# Patient Record
Sex: MALE | Race: OTHER | URBAN - METROPOLITAN AREA
[De-identification: names, ages, dates, MRNs, and addresses within clinical notes are randomized per-mention and may not be internally consistent; named-entity substitution may affect disease eponyms.]

---

## 2015-12-02 RX ORDER — METOPROLOL TARTRATE 50 MG
1 TABLET ORAL
Qty: 0 | Refills: 0 | COMMUNITY
Start: 2015-12-02

## 2017-06-05 ENCOUNTER — EMERGENCY (EMERGENCY)
Facility: HOSPITAL | Age: 72
LOS: 1 days | Discharge: HOME | End: 2017-06-05
Attending: EMERGENCY MEDICINE | Admitting: EMERGENCY MEDICINE
Payer: MEDICARE

## 2017-06-05 VITALS
DIASTOLIC BLOOD PRESSURE: 71 MMHG | OXYGEN SATURATION: 100 % | RESPIRATION RATE: 16 BRPM | TEMPERATURE: 98 F | HEART RATE: 70 BPM | SYSTOLIC BLOOD PRESSURE: 150 MMHG

## 2017-06-05 DIAGNOSIS — Z98.89 OTHER SPECIFIED POSTPROCEDURAL STATES: Chronic | ICD-10-CM

## 2017-06-05 DIAGNOSIS — Z96.653 PRESENCE OF ARTIFICIAL KNEE JOINT, BILATERAL: Chronic | ICD-10-CM

## 2017-06-05 PROCEDURE — 99284 EMERGENCY DEPT VISIT MOD MDM: CPT | Mod: 25

## 2017-06-05 NOTE — ED ADULT TRIAGE NOTE - CHIEF COMPLAINT QUOTE
pt. states he had a fall yesterday afternoon , secondary to a mechanical fall. Pt. states fall was witnessed, where he hit the front of his head and injured his right wrist/ hand. Pt. arrives with scrapes on his nose and a swollen right hand. Pt. has + pulses , and has intact sensation. Pt. has noticeable weakness on right hand, difficult for pt. to move 2nd and 3rd digit. pt. states he had a fall yesterday afternoon , secondary to a mechanical fall. Pt. states fall was witnessed, where he hit the front of his head and injured his right wrist/ hand. Pt. arrives with scrapes on his nose and a swollen right hand. Pt. states he is only on baby aspirin.  Pt. has + pulses , and has intact sensation. Pt. has noticeable weakness on right hand, difficult for pt. to move 2nd and 3rd digit.

## 2017-06-06 VITALS
SYSTOLIC BLOOD PRESSURE: 138 MMHG | RESPIRATION RATE: 16 BRPM | HEART RATE: 68 BPM | OXYGEN SATURATION: 100 % | DIASTOLIC BLOOD PRESSURE: 69 MMHG | TEMPERATURE: 98 F

## 2017-06-06 PROCEDURE — 73502 X-RAY EXAM HIP UNI 2-3 VIEWS: CPT | Mod: 26,RT

## 2017-06-06 PROCEDURE — 70486 CT MAXILLOFACIAL W/O DYE: CPT | Mod: 26

## 2017-06-06 PROCEDURE — 72125 CT NECK SPINE W/O DYE: CPT | Mod: 26

## 2017-06-06 PROCEDURE — 73130 X-RAY EXAM OF HAND: CPT | Mod: 26,RT

## 2017-06-06 PROCEDURE — 70450 CT HEAD/BRAIN W/O DYE: CPT | Mod: 26

## 2017-06-06 RX ORDER — ACETAMINOPHEN 500 MG
975 TABLET ORAL ONCE
Qty: 0 | Refills: 0 | Status: COMPLETED | OUTPATIENT
Start: 2017-06-06 | End: 2017-06-06

## 2017-06-06 RX ADMIN — Medication 975 MILLIGRAM(S): at 02:21

## 2017-06-06 NOTE — ED PROVIDER NOTE - OBJECTIVE STATEMENT
73 yo man presenting with mechanical fall yesterday presenting with facial pain, right hand pain, and right hip pain.  Pain is worse with movement, worst at hand, nonradiating, not better with anything, moderate in severity.  No LOC, does report he fell forward on to his face.   Has been ambulating since then.

## 2017-06-06 NOTE — ED PROVIDER NOTE - PHYSICAL EXAMINATION
[facility] att: General: Well appearing, nontoxic, no acute distress; Head: Normocephalic small abrasions at face, no maxillofacial padmini tenderness; Eyes: PERRL, EOMI, No e/o ocular trauma; ENT: Airway patent, Oral and nasal within normal limits, no e/o septal hematoma, no e/o dental trauma; Neck: Bilateral paraspinal tenderness and midline tenderness, no anterior mass or crepitus, trachea midline; Chest: Lungs clear to auscultation bilateral, no chest wall tenderness or crepitus; Cardiac: Regular rate and rhythm, no murmurs, rubs or gallops; Abdomen: soft, nontender, nondistended; no guarding or rebound; Musculoskeletal: Right hip tender, no pain with axial load or hip flexion, no knee, ankle, foot tenderness, swelling and tender at right 2-4 metacarpals, no snuff box or carpal tenderness, no proximal tendernessExtremities symmetric, nontender at proximal tenderness at forearm, elbow, shoulder, othewise extremities nontender; Skin: No rash, normal skin tone; Neuro: Alert and Oriented to person, place, and time; No focal deficit, CN 2-12 symmetric and intact; Back: No thoracic or lumbar midline tenderness or deformity

## 2017-06-06 NOTE — ED PROVIDER NOTE - MEDICAL DECISION MAKING DETAILS
Liz att: 71 yo man presenting with mechanical fall. Liz att: 71 yo man presenting with mechanical fall, head/face trauma.  No e/o skull fracture, intracranial bleed, dental trauma, cervical, thoracic, or vertebral fracture or subluxation, no suspicion of thoracic, abdominal, or pelvis.  Significant amount of swelling at right 2/3 MC, no obvious fracture on XRay.  Placed in volar splint, referred to hand surgery.  Patient informed of ED visit findings, understands plan.  Patient provided with written and further verbal instructions not included in discharge paperwork.  Patient instructed to follow up with their primary care physician in 2-3 days and return for new, worsened, or persistent symptoms.

## 2017-06-06 NOTE — ED PROVIDER NOTE - CARE PLAN
Principal Discharge DX:	Traumatic injury of head, initial encounter  Secondary Diagnosis:	Contusion of right hand, initial encounter

## 2017-06-06 NOTE — ED PROVIDER NOTE - PSH
History of cardiac catheterization  2010 2 stents, 2012 3 stents, 12/2014 2 coronary artery stents  S/P knee replacement, bilateral    S/P shoulder surgery  s/p R rotator cuff surgery

## 2017-06-06 NOTE — ED PROVIDER NOTE - PMH
Anginal pain    Benign hypertrophy of prostate    CAD (coronary artery disease)    Depression  type 2 controlled  Diabetes    Diverticulitis    Hyperlipidemia    Hypertension    Hyponatremia    Lumbar disc disorder    MI (myocardial infarction)  12/2014  OA (osteoarthritis)    Stented coronary artery    Thyroid disease

## 2017-06-06 NOTE — ED ADULT NURSE NOTE - CHIEF COMPLAINT QUOTE
pt. states he had a fall yesterday afternoon , secondary to a mechanical fall. Pt. states fall was witnessed, where he hit the front of his head and injured his right wrist/ hand. Pt. arrives with scrapes on his nose and a swollen right hand. Pt. states he is only on baby aspirin.  Pt. has + pulses , and has intact sensation. Pt. has noticeable weakness on right hand, difficult for pt. to move 2nd and 3rd digit.

## 2017-06-06 NOTE — ED ADULT NURSE NOTE - OBJECTIVE STATEMENT
pt received to room 27, AAOx3, c/o pain to rt. hip, rt. hand, and face s/p mechanical fall at home yesterday. pt noted w abrasion to nose. VS as noted, pt in NAD, evaluated by MD, awaiting XR and CT, medicated per orders, will continue to monitor pt.

## 2018-04-09 ENCOUNTER — OUTPATIENT (OUTPATIENT)
Dept: OUTPATIENT SERVICES | Facility: HOSPITAL | Age: 73
LOS: 1 days | End: 2018-04-09
Payer: MEDICARE

## 2018-04-09 VITALS
WEIGHT: 179.9 LBS | TEMPERATURE: 98 F | DIASTOLIC BLOOD PRESSURE: 77 MMHG | OXYGEN SATURATION: 97 % | SYSTOLIC BLOOD PRESSURE: 133 MMHG | HEIGHT: 65 IN | HEART RATE: 74 BPM | RESPIRATION RATE: 16 BRPM

## 2018-04-09 DIAGNOSIS — Z98.89 OTHER SPECIFIED POSTPROCEDURAL STATES: Chronic | ICD-10-CM

## 2018-04-09 DIAGNOSIS — M54.9 DORSALGIA, UNSPECIFIED: ICD-10-CM

## 2018-04-09 DIAGNOSIS — M71.38 OTHER BURSAL CYST, OTHER SITE: ICD-10-CM

## 2018-04-09 DIAGNOSIS — Z01.818 ENCOUNTER FOR OTHER PREPROCEDURAL EXAMINATION: ICD-10-CM

## 2018-04-09 DIAGNOSIS — Z96.653 PRESENCE OF ARTIFICIAL KNEE JOINT, BILATERAL: Chronic | ICD-10-CM

## 2018-04-09 DIAGNOSIS — I25.10 ATHEROSCLEROTIC HEART DISEASE OF NATIVE CORONARY ARTERY WITHOUT ANGINA PECTORIS: ICD-10-CM

## 2018-04-09 DIAGNOSIS — M54.16 RADICULOPATHY, LUMBAR REGION: ICD-10-CM

## 2018-04-09 DIAGNOSIS — Z98.890 OTHER SPECIFIED POSTPROCEDURAL STATES: Chronic | ICD-10-CM

## 2018-04-09 DIAGNOSIS — E11.9 TYPE 2 DIABETES MELLITUS WITHOUT COMPLICATIONS: ICD-10-CM

## 2018-04-09 LAB
ANION GAP SERPL CALC-SCNC: 12 MMOL/L — SIGNIFICANT CHANGE UP (ref 5–17)
BUN SERPL-MCNC: 17 MG/DL — SIGNIFICANT CHANGE UP (ref 7–23)
CALCIUM SERPL-MCNC: 9.2 MG/DL — SIGNIFICANT CHANGE UP (ref 8.4–10.5)
CHLORIDE SERPL-SCNC: 99 MMOL/L — SIGNIFICANT CHANGE UP (ref 96–108)
CO2 SERPL-SCNC: 25 MMOL/L — SIGNIFICANT CHANGE UP (ref 22–31)
CREAT SERPL-MCNC: 1.05 MG/DL — SIGNIFICANT CHANGE UP (ref 0.5–1.3)
GLUCOSE SERPL-MCNC: 131 MG/DL — HIGH (ref 70–99)
HBA1C BLD-MCNC: 6.2 % — HIGH (ref 4–5.6)
HCT VFR BLD CALC: 33.9 % — LOW (ref 39–50)
HGB BLD-MCNC: 11.7 G/DL — LOW (ref 13–17)
MCHC RBC-ENTMCNC: 32.2 PG — SIGNIFICANT CHANGE UP (ref 27–34)
MCHC RBC-ENTMCNC: 34.5 GM/DL — SIGNIFICANT CHANGE UP (ref 32–36)
MCV RBC AUTO: 93.4 FL — SIGNIFICANT CHANGE UP (ref 80–100)
MRSA PCR RESULT.: SIGNIFICANT CHANGE UP
PLATELET # BLD AUTO: 251 K/UL — SIGNIFICANT CHANGE UP (ref 150–400)
POTASSIUM SERPL-MCNC: 4.1 MMOL/L — SIGNIFICANT CHANGE UP (ref 3.5–5.3)
POTASSIUM SERPL-SCNC: 4.1 MMOL/L — SIGNIFICANT CHANGE UP (ref 3.5–5.3)
RBC # BLD: 3.63 M/UL — LOW (ref 4.2–5.8)
RBC # FLD: 13.6 % — SIGNIFICANT CHANGE UP (ref 10.3–14.5)
S AUREUS DNA NOSE QL NAA+PROBE: SIGNIFICANT CHANGE UP
SODIUM SERPL-SCNC: 136 MMOL/L — SIGNIFICANT CHANGE UP (ref 135–145)
WBC # BLD: 4.37 K/UL — SIGNIFICANT CHANGE UP (ref 3.8–10.5)
WBC # FLD AUTO: 4.37 K/UL — SIGNIFICANT CHANGE UP (ref 3.8–10.5)

## 2018-04-09 PROCEDURE — 80048 BASIC METABOLIC PNL TOTAL CA: CPT

## 2018-04-09 PROCEDURE — 87640 STAPH A DNA AMP PROBE: CPT

## 2018-04-09 PROCEDURE — 85027 COMPLETE CBC AUTOMATED: CPT

## 2018-04-09 PROCEDURE — 83036 HEMOGLOBIN GLYCOSYLATED A1C: CPT

## 2018-04-09 PROCEDURE — 87641 MR-STAPH DNA AMP PROBE: CPT

## 2018-04-09 PROCEDURE — G0463: CPT

## 2018-04-09 RX ORDER — LIDOCAINE HCL 20 MG/ML
0.2 VIAL (ML) INJECTION ONCE
Qty: 0 | Refills: 0 | Status: DISCONTINUED | OUTPATIENT
Start: 2018-04-23 | End: 2018-04-23

## 2018-04-09 RX ORDER — SODIUM CHLORIDE 9 MG/ML
3 INJECTION INTRAMUSCULAR; INTRAVENOUS; SUBCUTANEOUS EVERY 8 HOURS
Qty: 0 | Refills: 0 | Status: DISCONTINUED | OUTPATIENT
Start: 2018-04-23 | End: 2018-04-23

## 2018-04-09 RX ORDER — CEFAZOLIN SODIUM 1 G
2000 VIAL (EA) INJECTION ONCE
Qty: 0 | Refills: 0 | Status: DISCONTINUED | OUTPATIENT
Start: 2018-04-23 | End: 2018-04-23

## 2018-04-09 NOTE — H&P PST ADULT - HISTORY OF PRESENT ILLNESS
69 y/o male H/O Lumbar pain radiating down b/l legs and paresthesia f 72 year old male with PMH of HTN, CAD with stents x 7 last 2 stents from 2014, Dm2, BPH, GERD with complaints of lower back pain x many months worse x 2-3 months planned for Left L3 Decompressive hemilaminectomy, excision of synovial cyst on 4/23/18.

## 2018-04-09 NOTE — H&P PST ADULT - NEGATIVE CARDIOVASCULAR SYMPTOMS
no dyspnea on exertion/no paroxysmal nocturnal dyspnea/no chest pain/no peripheral edema/no palpitations

## 2018-04-09 NOTE — H&P PST ADULT - PSH
History of cardiac catheterization  2010 2 stents, 2012 3 stents, 12/2014 2 coronary artery stents  S/P knee replacement, bilateral    S/P shoulder surgery  s/p R rotator cuff surgery History of back surgery  lumbar  History of cardiac catheterization  2010 2 stents, 2012 3 stents, 12/2014 2 coronary artery stents  S/P knee replacement, bilateral    S/P shoulder surgery  s/p R rotator cuff surgery

## 2018-04-09 NOTE — H&P PST ADULT - PMH
Benign hypertrophy of prostate    CAD (coronary artery disease)    Depression    Diabetes  type 2  Diverticulitis    Hyperlipidemia    Hypertension    Lumbar disc disorder    MI (myocardial infarction)  12/2014  OA (osteoarthritis)    Stented coronary artery    Thyroid disease Benign hypertrophy of prostate    CAD (coronary artery disease)  cardiac stents x7  Depression    Diabetes  type 2  Diverticulitis    Hyperlipidemia    Hypertension    Lumbar disc disorder    Lumbar radiculopathy    MI (myocardial infarction)  12/2014  OA (osteoarthritis)    Stented coronary artery    Thyroid disease

## 2018-04-09 NOTE — H&P PST ADULT - PROBLEM SELECTOR PLAN 3
HgA1c ordered  FS the day of procedure  Hold Janumet, Actos am of procedure  continue Levemir at night

## 2018-04-09 NOTE — H&P PST ADULT - PROBLEM SELECTOR PLAN 2
continue ASA ( spoke to Catalino At Dr. Banegas office)  patient scheduled to see cardiologist 4/10 asymptomatic   continue ASA ( spoke to Catalino At Dr. Banegas office)  EKG 6/2017 on sunrise

## 2018-04-09 NOTE — H&P PST ADULT - GASTROINTESTINAL DETAILS
bowel sounds normal/soft/no distention soft/bowel sounds normal/no masses palpable/nontender/no distention

## 2018-04-09 NOTE — H&P PST ADULT - PROBLEM SELECTOR PLAN 1
planned for Left L3 Decompressive hemilaminectomy, excision of synovial cyst on 4/23/18.   PST labs send  EKG 6/2017 on sunrise   Preprocedure surgical scrub instructions discussed planned for Left L3 Decompressive hemilaminectomy, excision of synovial cyst on 4/23/18.   PST labs send    Preprocedure surgical scrub instructions discussed

## 2018-04-09 NOTE — H&P PST ADULT - MUSCULOSKELETAL
details… detailed exam normal strength/no calf tenderness/ROM intact no joint erythema/no joint warmth/no calf tenderness

## 2018-04-09 NOTE — H&P PST ADULT - ATTENDING COMMENTS
Pt seen and examined. Pt had slight decrease in level of consciousness this morning in SDA. . BP normotensive. CBC and Chemistry panel unremarkable. EKG demonstrates T wave inversions in lateral leads. Pt received 324 mg of chewable ASA. Will cancel surgery and have pt evaluated in [facility] ER. Case d/w Dr. Perez of Anesthesia. Case also d/w Dr. Qasim De Paz of Cardiology, who will evaluate pt. Dr. Perez has spoken with the patient's son, who is a physcian. Pt seen and examined. Pt had slight decrease in level of consciousness this morning in SDA. . BP normotensive. CBC and Chemistry panel unremarkable. EKG demonstrates T wave inversions in lateral leads. Pt received 324 mg of chewable ASA. Will cancel surgery and have pt evaluated in [facility] ER. Case d/w Dr. Perez of Anesthesia. Case also d/w Dr. Qasim De Paz of Cardiology, who will evaluate pt. Dr. Perez has spoken with the patient's son, who is a physcian.    Addendum 4/30/18:  Pt evaluated in last week with cardiac echo and stress test - pt has been cleared for surgery today by Cardiologist.

## 2018-04-09 NOTE — H&P PST ADULT - CONSTITUTIONAL DETAILS
well-groomed/well-developed/obese/no distress no distress/well-developed/well-nourished/well-groomed

## 2018-04-09 NOTE — H&P PST ADULT - CONSTITUTIONAL
Anesthesia Evaluation     . Pt has had prior anesthetic.            ROS/MED HX    ENT/Pulmonary:       Neurologic:       Cardiovascular:         METS/Exercise Tolerance:     Hematologic:         Musculoskeletal:         GI/Hepatic:     (+) GERD Other GI/Hepatic celiac dz, eosin esoph.      Renal/Genitourinary:         Endo:         Psychiatric:         Infectious Disease:         Malignancy:         Other:                     Physical Exam  Normal systems: cardiovascular, pulmonary and dental    Airway   Mallampati: II  TM distance: >3 FB  Neck ROM: full    Dental     Cardiovascular       Pulmonary                     Anesthesia Plan      History & Physical Review  History and physical reviewed and following examination; no interval change.    ASA Status:  2 .    NPO Status:  > 8 hours    Plan for General with Inhalation induction. Maintenance will be Inhalation.           Postoperative Care      Consents  Anesthetic plan, risks, benefits and alternatives discussed with:  Patient and Parent (Mother and/or Father).  Use of blood products discussed: Yes.   Use of blood products discussed with Patient and Parent (Mother and/or Father).  Consented to blood products.  .                          .  
detailed exam

## 2018-04-23 ENCOUNTER — OUTPATIENT (OUTPATIENT)
Dept: INPATIENT UNIT | Facility: HOSPITAL | Age: 73
LOS: 1 days | End: 2018-04-23

## 2018-04-23 ENCOUNTER — EMERGENCY (EMERGENCY)
Facility: HOSPITAL | Age: 73
LOS: 1 days | Discharge: HOME | End: 2018-04-23
Attending: EMERGENCY MEDICINE
Payer: MEDICARE

## 2018-04-23 VITALS
RESPIRATION RATE: 19 BRPM | HEART RATE: 77 BPM | TEMPERATURE: 99 F | SYSTOLIC BLOOD PRESSURE: 176 MMHG | DIASTOLIC BLOOD PRESSURE: 92 MMHG | OXYGEN SATURATION: 96 %

## 2018-04-23 VITALS
DIASTOLIC BLOOD PRESSURE: 78 MMHG | SYSTOLIC BLOOD PRESSURE: 126 MMHG | OXYGEN SATURATION: 98 % | TEMPERATURE: 98 F | RESPIRATION RATE: 19 BRPM | HEART RATE: 75 BPM

## 2018-04-23 DIAGNOSIS — Z95.5 PRESENCE OF CORONARY ANGIOPLASTY IMPLANT AND GRAFT: Chronic | ICD-10-CM

## 2018-04-23 DIAGNOSIS — Z96.653 PRESENCE OF ARTIFICIAL KNEE JOINT, BILATERAL: Chronic | ICD-10-CM

## 2018-04-23 DIAGNOSIS — M71.38 OTHER BURSAL CYST, OTHER SITE: ICD-10-CM

## 2018-04-23 DIAGNOSIS — Z98.89 OTHER SPECIFIED POSTPROCEDURAL STATES: Chronic | ICD-10-CM

## 2018-04-23 DIAGNOSIS — M54.9 DORSALGIA, UNSPECIFIED: ICD-10-CM

## 2018-04-23 DIAGNOSIS — Z98.890 OTHER SPECIFIED POSTPROCEDURAL STATES: Chronic | ICD-10-CM

## 2018-04-23 LAB
ALBUMIN SERPL ELPH-MCNC: 3.8 G/DL — SIGNIFICANT CHANGE UP (ref 3.3–5)
ALP SERPL-CCNC: 48 U/L — SIGNIFICANT CHANGE UP (ref 40–120)
ALT FLD-CCNC: 15 U/L — SIGNIFICANT CHANGE UP (ref 10–45)
ANION GAP SERPL CALC-SCNC: 12 MMOL/L — SIGNIFICANT CHANGE UP (ref 5–17)
ANION GAP SERPL CALC-SCNC: 15 MMOL/L — SIGNIFICANT CHANGE UP (ref 5–17)
APTT BLD: 27.6 SEC — SIGNIFICANT CHANGE UP (ref 27.5–37.4)
AST SERPL-CCNC: 14 U/L — SIGNIFICANT CHANGE UP (ref 10–40)
BASOPHILS # BLD AUTO: 0 K/UL — SIGNIFICANT CHANGE UP (ref 0–0.2)
BASOPHILS NFR BLD AUTO: 0.2 % — SIGNIFICANT CHANGE UP (ref 0–2)
BILIRUB SERPL-MCNC: 0.5 MG/DL — SIGNIFICANT CHANGE UP (ref 0.2–1.2)
BLD GP AB SCN SERPL QL: NEGATIVE — SIGNIFICANT CHANGE UP
BUN SERPL-MCNC: 11 MG/DL — SIGNIFICANT CHANGE UP (ref 7–23)
BUN SERPL-MCNC: 12 MG/DL — SIGNIFICANT CHANGE UP (ref 7–23)
CALCIUM SERPL-MCNC: 9.2 MG/DL — SIGNIFICANT CHANGE UP (ref 8.4–10.5)
CALCIUM SERPL-MCNC: 9.4 MG/DL — SIGNIFICANT CHANGE UP (ref 8.4–10.5)
CHLORIDE SERPL-SCNC: 100 MMOL/L — SIGNIFICANT CHANGE UP (ref 96–108)
CHLORIDE SERPL-SCNC: 99 MMOL/L — SIGNIFICANT CHANGE UP (ref 96–108)
CO2 SERPL-SCNC: 22 MMOL/L — SIGNIFICANT CHANGE UP (ref 22–31)
CO2 SERPL-SCNC: 26 MMOL/L — SIGNIFICANT CHANGE UP (ref 22–31)
CREAT SERPL-MCNC: 1.12 MG/DL — SIGNIFICANT CHANGE UP (ref 0.5–1.3)
CREAT SERPL-MCNC: 1.23 MG/DL — SIGNIFICANT CHANGE UP (ref 0.5–1.3)
EOSINOPHIL # BLD AUTO: 0 K/UL — SIGNIFICANT CHANGE UP (ref 0–0.5)
EOSINOPHIL NFR BLD AUTO: 0.5 % — SIGNIFICANT CHANGE UP (ref 0–6)
GLUCOSE BLDC GLUCOMTR-MCNC: 133 MG/DL — HIGH (ref 70–99)
GLUCOSE SERPL-MCNC: 146 MG/DL — HIGH (ref 70–99)
GLUCOSE SERPL-MCNC: 147 MG/DL — HIGH (ref 70–99)
HCT VFR BLD CALC: 36.5 % — LOW (ref 39–50)
HCT VFR BLD CALC: 37.3 % — LOW (ref 39–50)
HGB BLD-MCNC: 12.4 G/DL — LOW (ref 13–17)
HGB BLD-MCNC: 12.5 G/DL — LOW (ref 13–17)
INR BLD: 1.22 RATIO — HIGH (ref 0.88–1.16)
LYMPHOCYTES # BLD AUTO: 0.9 K/UL — LOW (ref 1–3.3)
LYMPHOCYTES # BLD AUTO: 11.6 % — LOW (ref 13–44)
MCHC RBC-ENTMCNC: 33.3 PG — SIGNIFICANT CHANGE UP (ref 27–34)
MCHC RBC-ENTMCNC: 33.6 GM/DL — SIGNIFICANT CHANGE UP (ref 32–36)
MCHC RBC-ENTMCNC: 33.7 PG — SIGNIFICANT CHANGE UP (ref 27–34)
MCHC RBC-ENTMCNC: 34 GM/DL — SIGNIFICANT CHANGE UP (ref 32–36)
MCV RBC AUTO: 99.1 FL — SIGNIFICANT CHANGE UP (ref 80–100)
MCV RBC AUTO: 99.1 FL — SIGNIFICANT CHANGE UP (ref 80–100)
MONOCYTES # BLD AUTO: 0.9 K/UL — SIGNIFICANT CHANGE UP (ref 0–0.9)
MONOCYTES NFR BLD AUTO: 10.9 % — SIGNIFICANT CHANGE UP (ref 2–14)
NEUTROPHILS # BLD AUTO: 6.2 K/UL — SIGNIFICANT CHANGE UP (ref 1.8–7.4)
NEUTROPHILS NFR BLD AUTO: 76.8 % — SIGNIFICANT CHANGE UP (ref 43–77)
PLATELET # BLD AUTO: 223 K/UL — SIGNIFICANT CHANGE UP (ref 150–400)
PLATELET # BLD AUTO: 238 K/UL — SIGNIFICANT CHANGE UP (ref 150–400)
POTASSIUM SERPL-MCNC: 3.8 MMOL/L — SIGNIFICANT CHANGE UP (ref 3.5–5.3)
POTASSIUM SERPL-MCNC: 4 MMOL/L — SIGNIFICANT CHANGE UP (ref 3.5–5.3)
POTASSIUM SERPL-SCNC: 3.8 MMOL/L — SIGNIFICANT CHANGE UP (ref 3.5–5.3)
POTASSIUM SERPL-SCNC: 4 MMOL/L — SIGNIFICANT CHANGE UP (ref 3.5–5.3)
PROT SERPL-MCNC: 7.6 G/DL — SIGNIFICANT CHANGE UP (ref 6–8.3)
PROTHROM AB SERPL-ACNC: 13.3 SEC — HIGH (ref 9.8–12.7)
RBC # BLD: 3.68 M/UL — LOW (ref 4.2–5.8)
RBC # BLD: 3.76 M/UL — LOW (ref 4.2–5.8)
RBC # FLD: 12.9 % — SIGNIFICANT CHANGE UP (ref 10.3–14.5)
RBC # FLD: 12.9 % — SIGNIFICANT CHANGE UP (ref 10.3–14.5)
RH IG SCN BLD-IMP: POSITIVE — SIGNIFICANT CHANGE UP
SODIUM SERPL-SCNC: 137 MMOL/L — SIGNIFICANT CHANGE UP (ref 135–145)
SODIUM SERPL-SCNC: 137 MMOL/L — SIGNIFICANT CHANGE UP (ref 135–145)
TROPONIN T SERPL-MCNC: <0.01 NG/ML — SIGNIFICANT CHANGE UP (ref 0–0.06)
TROPONIN T SERPL-MCNC: <0.01 NG/ML — SIGNIFICANT CHANGE UP (ref 0–0.06)
WBC # BLD: 6.6 K/UL — SIGNIFICANT CHANGE UP (ref 3.8–10.5)
WBC # BLD: 8 K/UL — SIGNIFICANT CHANGE UP (ref 3.8–10.5)
WBC # FLD AUTO: 6.6 K/UL — SIGNIFICANT CHANGE UP (ref 3.8–10.5)
WBC # FLD AUTO: 8 K/UL — SIGNIFICANT CHANGE UP (ref 3.8–10.5)

## 2018-04-23 PROCEDURE — 80053 COMPREHEN METABOLIC PANEL: CPT

## 2018-04-23 PROCEDURE — 86900 BLOOD TYPING SEROLOGIC ABO: CPT

## 2018-04-23 PROCEDURE — 71045 X-RAY EXAM CHEST 1 VIEW: CPT | Mod: 26

## 2018-04-23 PROCEDURE — 99285 EMERGENCY DEPT VISIT HI MDM: CPT | Mod: 25,GC

## 2018-04-23 PROCEDURE — 86901 BLOOD TYPING SEROLOGIC RH(D): CPT

## 2018-04-23 PROCEDURE — 85610 PROTHROMBIN TIME: CPT

## 2018-04-23 PROCEDURE — 80048 BASIC METABOLIC PNL TOTAL CA: CPT

## 2018-04-23 PROCEDURE — 93005 ELECTROCARDIOGRAM TRACING: CPT

## 2018-04-23 PROCEDURE — 93010 ELECTROCARDIOGRAM REPORT: CPT

## 2018-04-23 PROCEDURE — 99284 EMERGENCY DEPT VISIT MOD MDM: CPT

## 2018-04-23 PROCEDURE — 85730 THROMBOPLASTIN TIME PARTIAL: CPT

## 2018-04-23 PROCEDURE — 93308 TTE F-UP OR LMTD: CPT

## 2018-04-23 PROCEDURE — 86850 RBC ANTIBODY SCREEN: CPT

## 2018-04-23 PROCEDURE — 71045 X-RAY EXAM CHEST 1 VIEW: CPT

## 2018-04-23 PROCEDURE — 84484 ASSAY OF TROPONIN QUANT: CPT

## 2018-04-23 PROCEDURE — 85027 COMPLETE CBC AUTOMATED: CPT

## 2018-04-23 PROCEDURE — 93308 TTE F-UP OR LMTD: CPT | Mod: 26

## 2018-04-23 PROCEDURE — 82962 GLUCOSE BLOOD TEST: CPT

## 2018-04-23 RX ORDER — ASPIRIN/CALCIUM CARB/MAGNESIUM 324 MG
324 TABLET ORAL ONCE
Qty: 0 | Refills: 0 | Status: COMPLETED | OUTPATIENT
Start: 2018-04-23 | End: 2018-04-23

## 2018-04-23 RX ADMIN — SODIUM CHLORIDE 3 MILLILITER(S): 9 INJECTION INTRAMUSCULAR; INTRAVENOUS; SUBCUTANEOUS at 07:55

## 2018-04-23 RX ADMIN — Medication 324 MILLIGRAM(S): at 07:55

## 2018-04-23 NOTE — ED ADULT NURSE NOTE - PSH
H/O heart artery stent    History of back surgery  lumbar  History of cardiac catheterization  2010 2 stents, 2012 3 stents, 12/2014 2 coronary artery stents  S/P knee replacement, bilateral    S/P shoulder surgery  s/p R rotator cuff surgery

## 2018-04-23 NOTE — ED PROVIDER NOTE - PROGRESS NOTE DETAILS
Work up syncope. Labs/EKG ordered. D/w Cardiologist, Dr. Shaik Harvey- Patient has old TWI in lateral precordials. History of vasovagal episode prior to knee procedure a few years. Attending Gray: called neurosurgery unable to do surgery today. will need to reschedule. delta trop unremarkable. will po and d/c

## 2018-04-23 NOTE — CONSULT NOTE ADULT - ASSESSMENT
Syncope  likely vasovagal from hypovolemia   pt wants to follow up with his own cardiologist   consider DC ranexa  pt is advised to have stress test / echo prior to OR    abnormal EKG   as per his cardiologist the changes are chronic   agree with MARVIN    CAD  history of stent   antiplt therapy   statin

## 2018-04-23 NOTE — ED PROVIDER NOTE - MEDICAL DECISION MAKING DETAILS
Attending Castellano: 73 y/o male with multiple medical issues presenting after episode of syncopy. pt scheduled to have cyst removed today. while in pre op had reported synocpal episode. history atypical for seizure. upon arrival pt awake and alert without complaints. no black or bloody stools and conjuntiva pink making GIB less likely. no sob or pleuritic pain to suggest PE. pt has a history of vasovagal episodes in the past and did not eat today which could be source. d/w ;pt's cardiologist and EKG similar to previous. will re-eval

## 2018-04-23 NOTE — ED PROVIDER NOTE - PMH
Benign hypertrophy of prostate    CAD (coronary artery disease)  cardiac stents x7  Depression    Diabetes  type 2  Diverticulitis    Hyperlipidemia    Hypertension    Lumbar disc disorder    Lumbar radiculopathy    MI (myocardial infarction)  12/2014  OA (osteoarthritis)    Stented coronary artery    Thyroid disease

## 2018-04-23 NOTE — ED ADULT NURSE NOTE - OBJECTIVE STATEMENT
72 year old male alert and oriented x 4 came to the ED from Same Day Admission for surgery on cyst in L3.  Patient said he ws sitting in a chair and felt dizzy and closed his eyes for a few seconds and does not remember what happened.  Patient said he was incontinent of urine after the episode.  Patient said he was given 4 81mg ASA upstairs to chew and EKG was done and there was concern for EKG changes and patient was sent to the ED.  On arrival, patient speaking clearly and moving all 4 extremities and denies; chest pain, shortness of breath, N/V, fevers, chills, dizziness.  Patient placed on CM in SR and arrived with #18 IV in left forearm from prior unit in hospital.  Patient is Diabetic and said he took his Insulin last night and ate last about 2100.  Safety ensured.

## 2018-04-23 NOTE — CONSULT NOTE ADULT - SUBJECTIVE AND OBJECTIVE BOX
CHIEF COMPLAINT:Patient is a 72y old  Male who presents with a chief complaint of     HISTORY OF PRESENT ILLNESS:  This is a pleasant gentleman with history as below presented with back pain planned for spine surgery but had syncope today and dizziness prior to surgery  now feels better  no cp   no sob     PAST MEDICAL & SURGICAL HISTORY:  Lumbar radiculopathy  OA (osteoarthritis)  Diverticulitis  MI (myocardial infarction): 12/2014  Lumbar disc disorder  Benign hypertrophy of prostate  Thyroid disease  Depression  Stented coronary artery  CAD (coronary artery disease): cardiac stents x7  Diabetes: type 2  Hyperlipidemia  Hypertension  H/O heart artery stent  History of back surgery: lumbar  History of cardiac catheterization: 2010 2 stents, 2012 3 stents, 12/2014 2 coronary artery stents  S/P shoulder surgery: s/p R rotator cuff surgery  S/P knee replacement, bilateral          MEDICATIONS:    reviewed               FAMILY HISTORY:  Family history of heart disease (Sibling)      Non-contributory    SOCIAL HISTORY:    No tobacco, drugs or etoh    Allergies    No Known Allergies    Intolerances    	    REVIEW OF SYSTEMS:  as above  The rest of the 14 points ROS reviewed and except above they are unremarkable.        PHYSICAL EXAM:  T(C): 36.9 (04-23-18 @ 08:30), Max: 37 (04-23-18 @ 08:22)  HR: 77 (04-23-18 @ 08:30) (77 - 77)  BP: 163/97 (04-23-18 @ 08:30) (163/97 - 176/92)  RR: 18 (04-23-18 @ 08:30) (18 - 19)  SpO2: 97% (04-23-18 @ 08:30) (96% - 97%)  Wt(kg): --  I&O's Summary      Appearance: Normal	  HEENT:   Normal oral mucosa, PERRL, EOMI	  Cardiovascular: Normal S1 S2,    Murmur:   Neck: JVP normal  Respiratory: Lungs clear to auscultation  Gastrointestinal:  Soft, Non-tender, + BS	  Skin: normal   Neuro: No gross deficits.   Psychiatry:  Mood & affect appropriate  Ext: No edema    LABS/DATA:    TELEMETRY: 	    ECG:  	sinus, lateral T wave inversion    	  CARDIAC MARKERS:  Troponin T, Serum: <0.01 ng/mL (04-23 @ 08:54)                                  12.5   8.0   )-----------( 223      ( 23 Apr 2018 08:54 )             37.3     04-23    137  |  100  |  12  ----------------------------<  146<H>  4.0   |  22  |  1.12    Ca    9.2      23 Apr 2018 08:54    TPro  7.6  /  Alb  3.8  /  TBili  0.5  /  DBili  x   /  AST  14  /  ALT  15  /  AlkPhos  48  04-23    proBNP:   Lipid Profile:   HgA1c:   TSH:

## 2018-04-23 NOTE — PROGRESS NOTE ADULT - SUBJECTIVE AND OBJECTIVE BOX
73 yo male  presents for decompressive hemilaminectomy.  Found this am by the nurse and Dr. Diego to be confused and incontinent to urine.    PMhx is significant for CAD stents X7 2014, HTN, HLD, DM.  Pt off ASA X5 days per pt.  EKG done this am showed new T wave inversions in lateral leads compared to the old EKG present in the chart.    Changes discussed with Surgeon.  Decision  to cancel case, Pt sent to ER. for further evaluation.

## 2018-04-23 NOTE — ED PROVIDER NOTE - OBJECTIVE STATEMENT
73 yo m with history of DM, HTN, CAD (7 stents), HLD presents from same day surgery (scheduled to have a cyst removed from L3 presents) after syncopal episode. Per wife, patient complained of dizziness, passed out and urinated on himself. The patient came to after a couple of seconds. Denies head trauma, chest pain or shortness of breath. Dizziness dissipated after arousal. No tonic clonic jerking movements. 71 yo m with history of DM, HTN, CAD (7 stents), HLD presents from same day surgery (scheduled to have a cyst removed from L3 presents) after syncopal episode. Per wife, patient complained of dizziness, passed out and urinated on himself. The patient came to after a couple of seconds. Denies head trauma, chest pain or shortness of breath. Dizziness dissipated after arousal. No tonic clonic jerking movements.    Cardiologist: Dr. Shaik Harvey [phone]

## 2018-04-23 NOTE — ED PROVIDER NOTE - PHYSICAL EXAMINATION
Attending Castellano: Gen: NAD, heent: atrauamtic, eomi, perrla, mmm, op pink, uvula midline, neck; nttp, no nuchal rigidity, chest: nttp, no crepitus, cv: rrr, no murmurs, lungs: ctab, abd: soft, nontender, nondistended, no peritoneal signs, +BS, no guarding, ext: wwp, neg homans, skin: no rash, neuro: awake and alert, following commands, speech clear, sensation and strength intact, no focal deficits

## 2018-04-30 ENCOUNTER — INPATIENT (INPATIENT)
Facility: HOSPITAL | Age: 73
LOS: 3 days | Discharge: NURSING, REHAB OR LONG-TERM CARE | DRG: 460 | End: 2018-05-04
Attending: NEUROLOGICAL SURGERY | Admitting: NEUROLOGICAL SURGERY
Payer: MEDICARE

## 2018-04-30 VITALS
SYSTOLIC BLOOD PRESSURE: 159 MMHG | DIASTOLIC BLOOD PRESSURE: 78 MMHG | RESPIRATION RATE: 18 BRPM | TEMPERATURE: 98 F | HEIGHT: 65 IN | HEART RATE: 68 BPM | WEIGHT: 179.9 LBS | OXYGEN SATURATION: 95 %

## 2018-04-30 DIAGNOSIS — Z98.890 OTHER SPECIFIED POSTPROCEDURAL STATES: Chronic | ICD-10-CM

## 2018-04-30 DIAGNOSIS — M54.9 DORSALGIA, UNSPECIFIED: ICD-10-CM

## 2018-04-30 DIAGNOSIS — M54.16 RADICULOPATHY, LUMBAR REGION: ICD-10-CM

## 2018-04-30 DIAGNOSIS — Z95.5 PRESENCE OF CORONARY ANGIOPLASTY IMPLANT AND GRAFT: Chronic | ICD-10-CM

## 2018-04-30 DIAGNOSIS — Z96.653 PRESENCE OF ARTIFICIAL KNEE JOINT, BILATERAL: Chronic | ICD-10-CM

## 2018-04-30 DIAGNOSIS — Z98.89 OTHER SPECIFIED POSTPROCEDURAL STATES: Chronic | ICD-10-CM

## 2018-04-30 LAB
ANION GAP SERPL CALC-SCNC: 17 MMOL/L — SIGNIFICANT CHANGE UP (ref 5–17)
BASOPHILS # BLD AUTO: 0 K/UL — SIGNIFICANT CHANGE UP (ref 0–0.2)
BASOPHILS NFR BLD AUTO: 0 % — SIGNIFICANT CHANGE UP (ref 0–2)
BLD GP AB SCN SERPL QL: NEGATIVE — SIGNIFICANT CHANGE UP
BUN SERPL-MCNC: 8 MG/DL — SIGNIFICANT CHANGE UP (ref 7–23)
CALCIUM SERPL-MCNC: 8.3 MG/DL — LOW (ref 8.4–10.5)
CHLORIDE SERPL-SCNC: 98 MMOL/L — SIGNIFICANT CHANGE UP (ref 96–108)
CO2 SERPL-SCNC: 23 MMOL/L — SIGNIFICANT CHANGE UP (ref 22–31)
CREAT SERPL-MCNC: 0.86 MG/DL — SIGNIFICANT CHANGE UP (ref 0.5–1.3)
EOSINOPHIL # BLD AUTO: 0 K/UL — SIGNIFICANT CHANGE UP (ref 0–0.5)
EOSINOPHIL NFR BLD AUTO: 0.1 % — SIGNIFICANT CHANGE UP (ref 0–6)
GAS PNL BLDA: SIGNIFICANT CHANGE UP
GAS PNL BLDA: SIGNIFICANT CHANGE UP
GLUCOSE BLDC GLUCOMTR-MCNC: 145 MG/DL — HIGH (ref 70–99)
GLUCOSE BLDC GLUCOMTR-MCNC: 172 MG/DL — HIGH (ref 70–99)
GLUCOSE BLDC GLUCOMTR-MCNC: 194 MG/DL — HIGH (ref 70–99)
GLUCOSE SERPL-MCNC: 198 MG/DL — HIGH (ref 70–99)
HCT VFR BLD CALC: 33.1 % — LOW (ref 39–50)
HGB BLD-MCNC: 11.4 G/DL — LOW (ref 13–17)
LYMPHOCYTES # BLD AUTO: 0.5 K/UL — LOW (ref 1–3.3)
LYMPHOCYTES # BLD AUTO: 7.6 % — LOW (ref 13–44)
MCHC RBC-ENTMCNC: 33.9 PG — SIGNIFICANT CHANGE UP (ref 27–34)
MCHC RBC-ENTMCNC: 34.5 GM/DL — SIGNIFICANT CHANGE UP (ref 32–36)
MCV RBC AUTO: 98.1 FL — SIGNIFICANT CHANGE UP (ref 80–100)
MONOCYTES # BLD AUTO: 0.2 K/UL — SIGNIFICANT CHANGE UP (ref 0–0.9)
MONOCYTES NFR BLD AUTO: 3.1 % — SIGNIFICANT CHANGE UP (ref 2–14)
NEUTROPHILS # BLD AUTO: 5.3 K/UL — SIGNIFICANT CHANGE UP (ref 1.8–7.4)
NEUTROPHILS NFR BLD AUTO: 89.2 % — HIGH (ref 43–77)
PLATELET # BLD AUTO: 240 K/UL — SIGNIFICANT CHANGE UP (ref 150–400)
POTASSIUM SERPL-MCNC: 3.8 MMOL/L — SIGNIFICANT CHANGE UP (ref 3.5–5.3)
POTASSIUM SERPL-SCNC: 3.8 MMOL/L — SIGNIFICANT CHANGE UP (ref 3.5–5.3)
RBC # BLD: 3.38 M/UL — LOW (ref 4.2–5.8)
RBC # FLD: 12.5 % — SIGNIFICANT CHANGE UP (ref 10.3–14.5)
RH IG SCN BLD-IMP: POSITIVE — SIGNIFICANT CHANGE UP
SODIUM SERPL-SCNC: 138 MMOL/L — SIGNIFICANT CHANGE UP (ref 135–145)
WBC # BLD: 5.9 K/UL — SIGNIFICANT CHANGE UP (ref 3.8–10.5)
WBC # FLD AUTO: 5.9 K/UL — SIGNIFICANT CHANGE UP (ref 3.8–10.5)

## 2018-04-30 RX ORDER — LEVOTHYROXINE SODIUM 125 MCG
25 TABLET ORAL DAILY
Qty: 0 | Refills: 0 | Status: DISCONTINUED | OUTPATIENT
Start: 2018-04-30 | End: 2018-05-04

## 2018-04-30 RX ORDER — DEXTROSE 50 % IN WATER 50 %
25 SYRINGE (ML) INTRAVENOUS ONCE
Qty: 0 | Refills: 0 | Status: DISCONTINUED | OUTPATIENT
Start: 2018-04-30 | End: 2018-05-04

## 2018-04-30 RX ORDER — OXYCODONE HYDROCHLORIDE 5 MG/1
15 TABLET ORAL EVERY 4 HOURS
Qty: 0 | Refills: 0 | Status: DISCONTINUED | OUTPATIENT
Start: 2018-04-30 | End: 2018-05-02

## 2018-04-30 RX ORDER — ENOXAPARIN SODIUM 100 MG/ML
40 INJECTION SUBCUTANEOUS DAILY
Qty: 0 | Refills: 0 | Status: DISCONTINUED | OUTPATIENT
Start: 2018-05-01 | End: 2018-05-04

## 2018-04-30 RX ORDER — DEXTROSE 50 % IN WATER 50 %
12.5 SYRINGE (ML) INTRAVENOUS ONCE
Qty: 0 | Refills: 0 | Status: DISCONTINUED | OUTPATIENT
Start: 2018-04-30 | End: 2018-05-04

## 2018-04-30 RX ORDER — METOPROLOL TARTRATE 50 MG
50 TABLET ORAL
Qty: 0 | Refills: 0 | Status: DISCONTINUED | OUTPATIENT
Start: 2018-04-30 | End: 2018-05-02

## 2018-04-30 RX ORDER — ASPIRIN/CALCIUM CARB/MAGNESIUM 324 MG
81 TABLET ORAL ONCE
Qty: 0 | Refills: 0 | Status: DISCONTINUED | OUTPATIENT
Start: 2018-04-30 | End: 2018-05-01

## 2018-04-30 RX ORDER — LIDOCAINE HCL 20 MG/ML
0.2 VIAL (ML) INJECTION ONCE
Qty: 0 | Refills: 0 | Status: COMPLETED | OUTPATIENT
Start: 2018-04-30 | End: 2018-04-30

## 2018-04-30 RX ORDER — OXYCODONE HYDROCHLORIDE 5 MG/1
10 TABLET ORAL EVERY 4 HOURS
Qty: 0 | Refills: 0 | Status: DISCONTINUED | OUTPATIENT
Start: 2018-04-30 | End: 2018-05-02

## 2018-04-30 RX ORDER — DIAZEPAM 5 MG
5 TABLET ORAL EVERY 12 HOURS
Qty: 0 | Refills: 0 | Status: DISCONTINUED | OUTPATIENT
Start: 2018-04-30 | End: 2018-05-01

## 2018-04-30 RX ORDER — GLUCAGON INJECTION, SOLUTION 0.5 MG/.1ML
1 INJECTION, SOLUTION SUBCUTANEOUS ONCE
Qty: 0 | Refills: 0 | Status: DISCONTINUED | OUTPATIENT
Start: 2018-04-30 | End: 2018-05-04

## 2018-04-30 RX ORDER — ACETAMINOPHEN 500 MG
650 TABLET ORAL EVERY 6 HOURS
Qty: 0 | Refills: 0 | Status: DISCONTINUED | OUTPATIENT
Start: 2018-04-30 | End: 2018-05-02

## 2018-04-30 RX ORDER — BACITRACIN ZINC NEOMYCIN SULFATE POLYMYXIN B SULFATE 400; 3.5; 5 [IU]/G; MG/G; [IU]/G
1 OINTMENT TOPICAL
Qty: 0 | Refills: 0 | Status: COMPLETED | OUTPATIENT
Start: 2018-04-30 | End: 2018-05-01

## 2018-04-30 RX ORDER — SODIUM CHLORIDE 9 MG/ML
3 INJECTION INTRAMUSCULAR; INTRAVENOUS; SUBCUTANEOUS EVERY 8 HOURS
Qty: 0 | Refills: 0 | Status: DISCONTINUED | OUTPATIENT
Start: 2018-04-30 | End: 2018-04-30

## 2018-04-30 RX ORDER — ONDANSETRON 8 MG/1
4 TABLET, FILM COATED ORAL EVERY 6 HOURS
Qty: 0 | Refills: 0 | Status: DISCONTINUED | OUTPATIENT
Start: 2018-04-30 | End: 2018-05-04

## 2018-04-30 RX ORDER — HYDROMORPHONE HYDROCHLORIDE 2 MG/ML
0.5 INJECTION INTRAMUSCULAR; INTRAVENOUS; SUBCUTANEOUS
Qty: 0 | Refills: 0 | Status: DISCONTINUED | OUTPATIENT
Start: 2018-04-30 | End: 2018-04-30

## 2018-04-30 RX ORDER — DEXTROSE 50 % IN WATER 50 %
1 SYRINGE (ML) INTRAVENOUS ONCE
Qty: 0 | Refills: 0 | Status: DISCONTINUED | OUTPATIENT
Start: 2018-04-30 | End: 2018-05-04

## 2018-04-30 RX ORDER — HYDROMORPHONE HYDROCHLORIDE 2 MG/ML
1 INJECTION INTRAMUSCULAR; INTRAVENOUS; SUBCUTANEOUS
Qty: 0 | Refills: 0 | Status: DISCONTINUED | OUTPATIENT
Start: 2018-04-30 | End: 2018-04-30

## 2018-04-30 RX ORDER — HYDRALAZINE HCL 50 MG
10 TABLET ORAL ONCE
Qty: 0 | Refills: 0 | Status: COMPLETED | OUTPATIENT
Start: 2018-04-30 | End: 2018-04-30

## 2018-04-30 RX ORDER — SIMVASTATIN 20 MG/1
20 TABLET, FILM COATED ORAL AT BEDTIME
Qty: 0 | Refills: 0 | Status: DISCONTINUED | OUTPATIENT
Start: 2018-04-30 | End: 2018-05-04

## 2018-04-30 RX ORDER — ASPIRIN/CALCIUM CARB/MAGNESIUM 324 MG
81 TABLET ORAL DAILY
Qty: 0 | Refills: 0 | Status: DISCONTINUED | OUTPATIENT
Start: 2018-04-30 | End: 2018-05-04

## 2018-04-30 RX ORDER — INSULIN LISPRO 100/ML
VIAL (ML) SUBCUTANEOUS
Qty: 0 | Refills: 0 | Status: DISCONTINUED | OUTPATIENT
Start: 2018-04-30 | End: 2018-05-04

## 2018-04-30 RX ORDER — ONDANSETRON 8 MG/1
4 TABLET, FILM COATED ORAL ONCE
Qty: 0 | Refills: 0 | Status: DISCONTINUED | OUTPATIENT
Start: 2018-04-30 | End: 2018-04-30

## 2018-04-30 RX ORDER — HYDROMORPHONE HYDROCHLORIDE 2 MG/ML
0.5 INJECTION INTRAMUSCULAR; INTRAVENOUS; SUBCUTANEOUS EVERY 6 HOURS
Qty: 0 | Refills: 0 | Status: DISCONTINUED | OUTPATIENT
Start: 2018-04-30 | End: 2018-05-04

## 2018-04-30 RX ORDER — FAMOTIDINE 10 MG/ML
20 INJECTION INTRAVENOUS
Qty: 0 | Refills: 0 | Status: DISCONTINUED | OUTPATIENT
Start: 2018-04-30 | End: 2018-05-04

## 2018-04-30 RX ORDER — SENNA PLUS 8.6 MG/1
2 TABLET ORAL AT BEDTIME
Qty: 0 | Refills: 0 | Status: DISCONTINUED | OUTPATIENT
Start: 2018-04-30 | End: 2018-05-04

## 2018-04-30 RX ORDER — GABAPENTIN 400 MG/1
300 CAPSULE ORAL
Qty: 0 | Refills: 0 | Status: DISCONTINUED | OUTPATIENT
Start: 2018-04-30 | End: 2018-05-02

## 2018-04-30 RX ORDER — TAMSULOSIN HYDROCHLORIDE 0.4 MG/1
0.4 CAPSULE ORAL DAILY
Qty: 0 | Refills: 0 | Status: DISCONTINUED | OUTPATIENT
Start: 2018-04-30 | End: 2018-05-04

## 2018-04-30 RX ORDER — SODIUM CHLORIDE 9 MG/ML
1000 INJECTION, SOLUTION INTRAVENOUS
Qty: 0 | Refills: 0 | Status: DISCONTINUED | OUTPATIENT
Start: 2018-04-30 | End: 2018-05-04

## 2018-04-30 RX ORDER — CEFAZOLIN SODIUM 1 G
2000 VIAL (EA) INJECTION EVERY 8 HOURS
Qty: 0 | Refills: 0 | Status: COMPLETED | OUTPATIENT
Start: 2018-04-30 | End: 2018-04-30

## 2018-04-30 RX ORDER — LOSARTAN POTASSIUM 100 MG/1
100 TABLET, FILM COATED ORAL DAILY
Qty: 0 | Refills: 0 | Status: DISCONTINUED | OUTPATIENT
Start: 2018-04-30 | End: 2018-05-02

## 2018-04-30 RX ORDER — INSULIN LISPRO 100/ML
VIAL (ML) SUBCUTANEOUS AT BEDTIME
Qty: 0 | Refills: 0 | Status: DISCONTINUED | OUTPATIENT
Start: 2018-04-30 | End: 2018-05-04

## 2018-04-30 RX ORDER — DOCUSATE SODIUM 100 MG
100 CAPSULE ORAL THREE TIMES A DAY
Qty: 0 | Refills: 0 | Status: DISCONTINUED | OUTPATIENT
Start: 2018-04-30 | End: 2018-05-04

## 2018-04-30 RX ORDER — DEXTROSE MONOHYDRATE, SODIUM CHLORIDE, AND POTASSIUM CHLORIDE 50; .745; 4.5 G/1000ML; G/1000ML; G/1000ML
1000 INJECTION, SOLUTION INTRAVENOUS
Qty: 0 | Refills: 0 | Status: DISCONTINUED | OUTPATIENT
Start: 2018-04-30 | End: 2018-05-01

## 2018-04-30 RX ADMIN — HYDROMORPHONE HYDROCHLORIDE 0.5 MILLIGRAM(S): 2 INJECTION INTRAMUSCULAR; INTRAVENOUS; SUBCUTANEOUS at 18:12

## 2018-04-30 RX ADMIN — Medication 100 MILLIGRAM(S): at 22:50

## 2018-04-30 RX ADMIN — TAMSULOSIN HYDROCHLORIDE 0.4 MILLIGRAM(S): 0.4 CAPSULE ORAL at 22:49

## 2018-04-30 RX ADMIN — Medication 2: at 17:01

## 2018-04-30 RX ADMIN — HYDROMORPHONE HYDROCHLORIDE 0.5 MILLIGRAM(S): 2 INJECTION INTRAMUSCULAR; INTRAVENOUS; SUBCUTANEOUS at 15:30

## 2018-04-30 RX ADMIN — Medication 10 MILLIGRAM(S): at 22:49

## 2018-04-30 RX ADMIN — Medication 50 MILLIGRAM(S): at 18:36

## 2018-04-30 RX ADMIN — Medication 100 MILLIGRAM(S): at 18:39

## 2018-04-30 RX ADMIN — DEXTROSE MONOHYDRATE, SODIUM CHLORIDE, AND POTASSIUM CHLORIDE 75 MILLILITER(S): 50; .745; 4.5 INJECTION, SOLUTION INTRAVENOUS at 14:48

## 2018-04-30 RX ADMIN — GABAPENTIN 300 MILLIGRAM(S): 400 CAPSULE ORAL at 18:33

## 2018-04-30 RX ADMIN — SODIUM CHLORIDE 3 MILLILITER(S): 9 INJECTION INTRAMUSCULAR; INTRAVENOUS; SUBCUTANEOUS at 07:04

## 2018-04-30 RX ADMIN — HYDROMORPHONE HYDROCHLORIDE 0.5 MILLIGRAM(S): 2 INJECTION INTRAMUSCULAR; INTRAVENOUS; SUBCUTANEOUS at 18:02

## 2018-04-30 RX ADMIN — HYDROMORPHONE HYDROCHLORIDE 0.5 MILLIGRAM(S): 2 INJECTION INTRAMUSCULAR; INTRAVENOUS; SUBCUTANEOUS at 15:19

## 2018-04-30 RX ADMIN — SIMVASTATIN 20 MILLIGRAM(S): 20 TABLET, FILM COATED ORAL at 22:50

## 2018-04-30 RX ADMIN — Medication 0.2 MILLILITER(S): at 07:04

## 2018-04-30 RX ADMIN — FAMOTIDINE 20 MILLIGRAM(S): 10 INJECTION INTRAVENOUS at 18:35

## 2018-04-30 RX ADMIN — SENNA PLUS 2 TABLET(S): 8.6 TABLET ORAL at 22:50

## 2018-04-30 RX ADMIN — OXYCODONE HYDROCHLORIDE 15 MILLIGRAM(S): 5 TABLET ORAL at 22:20

## 2018-04-30 RX ADMIN — OXYCODONE HYDROCHLORIDE 15 MILLIGRAM(S): 5 TABLET ORAL at 21:52

## 2018-04-30 NOTE — PROGRESS NOTE ADULT - SUBJECTIVE AND OBJECTIVE BOX
Visit Summary: Patient seen and evaluated at bedside.      Exam:  T(C): 36.4 (04-30-18 @ 13:30), Max: 36.5 (04-30-18 @ 06:57)  HR: 85 (04-30-18 @ 17:00) (68 - 85)  BP: 158/77 (04-30-18 @ 15:00) (154/70 - 177/77)  RR: 16 (04-30-18 @ 17:00) (12 - 18)  SpO2: 99% (04-30-18 @ 17:00) (95% - 100%)  Wt(kg): --    AOx3, FC, PERRL, EOMI, V1-3 intact, no facial, palate ernie symmetric, tongue midline, shrug 5/5  5/5 throughout, no drift  SILT  No clonus or babinski                          11.4   5.9   )-----------( 240      ( 30 Apr 2018 14:36 )             33.1     04-30    138  |  98  |  8   ----------------------------<  198<H>  3.8   |  23  |  0.86    Ca    8.3<L>      30 Apr 2018 14:36

## 2018-04-30 NOTE — BRIEF OPERATIVE NOTE - PROCEDURE
<<-----Click on this checkbox to enter Procedure Lumbar fusion of posterior column, posterior technique  04/30/2018    Active  [id number]

## 2018-04-30 NOTE — PATIENT PROFILE ADULT. - PSH
History of back surgery  lumbar  History of cardiac catheterization  2010 2 stents, 2012 3 stents, 12/2014 2 coronary artery stents  S/P knee replacement, bilateral    S/P shoulder surgery  s/p R rotator cuff surgery

## 2018-04-30 NOTE — PROGRESS NOTE ADULT - ASSESSMENT
72M s/p L3 hemilami, removal synovial cyst, revision L3-5 fusion  - Okay to transfer to floor when stable and pain controlled  - Pain control  - Neurochecks q4hrs  - PT eval

## 2018-05-01 ENCOUNTER — OUTPATIENT (OUTPATIENT)
Dept: OUTPATIENT SERVICES | Facility: HOSPITAL | Age: 73
LOS: 1 days | End: 2018-05-01
Payer: MEDICAID

## 2018-05-01 DIAGNOSIS — Z98.890 OTHER SPECIFIED POSTPROCEDURAL STATES: Chronic | ICD-10-CM

## 2018-05-01 DIAGNOSIS — Z95.5 PRESENCE OF CORONARY ANGIOPLASTY IMPLANT AND GRAFT: Chronic | ICD-10-CM

## 2018-05-01 DIAGNOSIS — Z96.653 PRESENCE OF ARTIFICIAL KNEE JOINT, BILATERAL: Chronic | ICD-10-CM

## 2018-05-01 DIAGNOSIS — Z98.89 OTHER SPECIFIED POSTPROCEDURAL STATES: Chronic | ICD-10-CM

## 2018-05-01 LAB
ANION GAP SERPL CALC-SCNC: 13 MMOL/L — SIGNIFICANT CHANGE UP (ref 5–17)
BASOPHILS # BLD AUTO: 0 K/UL — SIGNIFICANT CHANGE UP (ref 0–0.2)
BASOPHILS NFR BLD AUTO: 0 % — SIGNIFICANT CHANGE UP (ref 0–2)
BUN SERPL-MCNC: 7 MG/DL — SIGNIFICANT CHANGE UP (ref 7–23)
CALCIUM SERPL-MCNC: 8.8 MG/DL — SIGNIFICANT CHANGE UP (ref 8.4–10.5)
CHLORIDE SERPL-SCNC: 97 MMOL/L — SIGNIFICANT CHANGE UP (ref 96–108)
CO2 SERPL-SCNC: 26 MMOL/L — SIGNIFICANT CHANGE UP (ref 22–31)
CREAT SERPL-MCNC: 0.87 MG/DL — SIGNIFICANT CHANGE UP (ref 0.5–1.3)
EOSINOPHIL # BLD AUTO: 0 K/UL — SIGNIFICANT CHANGE UP (ref 0–0.5)
EOSINOPHIL NFR BLD AUTO: 0 % — SIGNIFICANT CHANGE UP (ref 0–6)
GLUCOSE BLDC GLUCOMTR-MCNC: 144 MG/DL — HIGH (ref 70–99)
GLUCOSE BLDC GLUCOMTR-MCNC: 173 MG/DL — HIGH (ref 70–99)
GLUCOSE BLDC GLUCOMTR-MCNC: 186 MG/DL — HIGH (ref 70–99)
GLUCOSE BLDC GLUCOMTR-MCNC: 194 MG/DL — HIGH (ref 70–99)
GLUCOSE BLDC GLUCOMTR-MCNC: 206 MG/DL — HIGH (ref 70–99)
GLUCOSE SERPL-MCNC: 197 MG/DL — HIGH (ref 70–99)
HCT VFR BLD CALC: 31 % — LOW (ref 39–50)
HGB BLD-MCNC: 10.6 G/DL — LOW (ref 13–17)
IMM GRANULOCYTES NFR BLD AUTO: 0.3 % — SIGNIFICANT CHANGE UP (ref 0–1.5)
LYMPHOCYTES # BLD AUTO: 0.58 K/UL — LOW (ref 1–3.3)
LYMPHOCYTES # BLD AUTO: 8.4 % — LOW (ref 13–44)
MCHC RBC-ENTMCNC: 32.8 PG — SIGNIFICANT CHANGE UP (ref 27–34)
MCHC RBC-ENTMCNC: 34.2 GM/DL — SIGNIFICANT CHANGE UP (ref 32–36)
MCV RBC AUTO: 96 FL — SIGNIFICANT CHANGE UP (ref 80–100)
MONOCYTES # BLD AUTO: 0.62 K/UL — SIGNIFICANT CHANGE UP (ref 0–0.9)
MONOCYTES NFR BLD AUTO: 9 % — SIGNIFICANT CHANGE UP (ref 2–14)
NEUTROPHILS # BLD AUTO: 5.69 K/UL — SIGNIFICANT CHANGE UP (ref 1.8–7.4)
NEUTROPHILS NFR BLD AUTO: 82.3 % — HIGH (ref 43–77)
PLATELET # BLD AUTO: 267 K/UL — SIGNIFICANT CHANGE UP (ref 150–400)
POTASSIUM SERPL-MCNC: 4 MMOL/L — SIGNIFICANT CHANGE UP (ref 3.5–5.3)
POTASSIUM SERPL-SCNC: 4 MMOL/L — SIGNIFICANT CHANGE UP (ref 3.5–5.3)
RBC # BLD: 3.23 M/UL — LOW (ref 4.2–5.8)
RBC # FLD: 13.6 % — SIGNIFICANT CHANGE UP (ref 10.3–14.5)
SODIUM SERPL-SCNC: 136 MMOL/L — SIGNIFICANT CHANGE UP (ref 135–145)
WBC # BLD: 6.91 K/UL — SIGNIFICANT CHANGE UP (ref 3.8–10.5)
WBC # FLD AUTO: 6.91 K/UL — SIGNIFICANT CHANGE UP (ref 3.8–10.5)

## 2018-05-01 PROCEDURE — G9001: CPT

## 2018-05-01 PROCEDURE — 74176 CT ABD & PELVIS W/O CONTRAST: CPT | Mod: 26

## 2018-05-01 RX ORDER — METHOCARBAMOL 500 MG/1
750 TABLET, FILM COATED ORAL EVERY 8 HOURS
Qty: 0 | Refills: 0 | Status: DISCONTINUED | OUTPATIENT
Start: 2018-05-01 | End: 2018-05-04

## 2018-05-01 RX ORDER — METHYLNALTREXONE BROMIDE 12 MG/.6ML
12 INJECTION, SOLUTION SUBCUTANEOUS ONCE
Qty: 0 | Refills: 0 | Status: COMPLETED | OUTPATIENT
Start: 2018-05-01 | End: 2018-05-01

## 2018-05-01 RX ORDER — SIMETHICONE 80 MG/1
80 TABLET, CHEWABLE ORAL EVERY 6 HOURS
Qty: 0 | Refills: 0 | Status: DISCONTINUED | OUTPATIENT
Start: 2018-05-01 | End: 2018-05-04

## 2018-05-01 RX ORDER — METHOCARBAMOL 500 MG/1
750 TABLET, FILM COATED ORAL EVERY 8 HOURS
Qty: 0 | Refills: 0 | Status: DISCONTINUED | OUTPATIENT
Start: 2018-05-01 | End: 2018-05-01

## 2018-05-01 RX ADMIN — Medication 100 MILLIGRAM(S): at 21:56

## 2018-05-01 RX ADMIN — LOSARTAN POTASSIUM 100 MILLIGRAM(S): 100 TABLET, FILM COATED ORAL at 05:37

## 2018-05-01 RX ADMIN — FAMOTIDINE 20 MILLIGRAM(S): 10 INJECTION INTRAVENOUS at 17:21

## 2018-05-01 RX ADMIN — METHYLNALTREXONE BROMIDE 12 MILLIGRAM(S): 12 INJECTION, SOLUTION SUBCUTANEOUS at 22:55

## 2018-05-01 RX ADMIN — GABAPENTIN 300 MILLIGRAM(S): 400 CAPSULE ORAL at 17:21

## 2018-05-01 RX ADMIN — SIMETHICONE 80 MILLIGRAM(S): 80 TABLET, CHEWABLE ORAL at 12:33

## 2018-05-01 RX ADMIN — Medication 25 MICROGRAM(S): at 05:37

## 2018-05-01 RX ADMIN — Medication 10 MILLIGRAM(S): at 12:32

## 2018-05-01 RX ADMIN — TAMSULOSIN HYDROCHLORIDE 0.4 MILLIGRAM(S): 0.4 CAPSULE ORAL at 11:17

## 2018-05-01 RX ADMIN — Medication 50 MILLIGRAM(S): at 05:37

## 2018-05-01 RX ADMIN — Medication 50 MILLIGRAM(S): at 17:21

## 2018-05-01 RX ADMIN — FAMOTIDINE 20 MILLIGRAM(S): 10 INJECTION INTRAVENOUS at 05:37

## 2018-05-01 RX ADMIN — OXYCODONE HYDROCHLORIDE 15 MILLIGRAM(S): 5 TABLET ORAL at 05:38

## 2018-05-01 RX ADMIN — OXYCODONE HYDROCHLORIDE 15 MILLIGRAM(S): 5 TABLET ORAL at 06:08

## 2018-05-01 RX ADMIN — METHOCARBAMOL 750 MILLIGRAM(S): 500 TABLET, FILM COATED ORAL at 21:56

## 2018-05-01 RX ADMIN — METHOCARBAMOL 750 MILLIGRAM(S): 500 TABLET, FILM COATED ORAL at 14:09

## 2018-05-01 RX ADMIN — SENNA PLUS 2 TABLET(S): 8.6 TABLET ORAL at 21:56

## 2018-05-01 RX ADMIN — GABAPENTIN 300 MILLIGRAM(S): 400 CAPSULE ORAL at 05:36

## 2018-05-01 RX ADMIN — Medication 2: at 17:26

## 2018-05-01 RX ADMIN — Medication 1 DROP(S): at 11:18

## 2018-05-01 RX ADMIN — OXYCODONE HYDROCHLORIDE 15 MILLIGRAM(S): 5 TABLET ORAL at 17:22

## 2018-05-01 RX ADMIN — OXYCODONE HYDROCHLORIDE 15 MILLIGRAM(S): 5 TABLET ORAL at 11:18

## 2018-05-01 RX ADMIN — Medication 4: at 14:07

## 2018-05-01 RX ADMIN — Medication 100 MILLIGRAM(S): at 14:09

## 2018-05-01 RX ADMIN — OXYCODONE HYDROCHLORIDE 15 MILLIGRAM(S): 5 TABLET ORAL at 11:48

## 2018-05-01 RX ADMIN — BACITRACIN ZINC NEOMYCIN SULFATE POLYMYXIN B SULFATE 1 APPLICATION(S): 400; 3.5; 5 OINTMENT TOPICAL at 06:30

## 2018-05-01 RX ADMIN — Medication 5 MILLIGRAM(S): at 01:08

## 2018-05-01 RX ADMIN — Medication 81 MILLIGRAM(S): at 11:18

## 2018-05-01 RX ADMIN — Medication 2: at 10:02

## 2018-05-01 RX ADMIN — SIMVASTATIN 20 MILLIGRAM(S): 20 TABLET, FILM COATED ORAL at 21:56

## 2018-05-01 RX ADMIN — OXYCODONE HYDROCHLORIDE 15 MILLIGRAM(S): 5 TABLET ORAL at 17:52

## 2018-05-01 NOTE — PHYSICAL THERAPY INITIAL EVALUATION ADULT - PLANNED THERAPY INTERVENTIONS, PT EVAL
GOALS: Pt will negotiate 12 steps with unilateral handrail and step to pattern independently in 4wks/transfer training/bed mobility training/gait training

## 2018-05-01 NOTE — CONSULT NOTE ADULT - SUBJECTIVE AND OBJECTIVE BOX
Patient is a 72y Male     Patient is a 72y old  Male who presents with a chief complaint of lower back pain (30 Apr 2018 07:00) Had lumbar spine surgery and now constipated, last BM 3 days ago, with abd distention, some discomfort. No prior GI history, no vomiting.  Receiving narcotics      HPI:      PAST MEDICAL & SURGICAL HISTORY:  Lumbar radiculopathy  OA (osteoarthritis)  Diverticulitis  MI (myocardial infarction): 12/2014  Lumbar disc disorder  Benign hypertrophy of prostate  Thyroid disease  Depression  Stented coronary artery  CAD (coronary artery disease): cardiac stents x7  Diabetes: type 2  Hyperlipidemia  Hypertension  H/O heart artery stent  History of back surgery: lumbar  History of cardiac catheterization: 2010 2 stents, 2012 3 stents, 12/2014 2 coronary artery stents  S/P shoulder surgery: s/p R rotator cuff surgery  S/P knee replacement, bilateral      MEDICATIONS  (STANDING):  artificial  tears Solution 1 Drop(s) Both EYES daily  aspirin enteric coated 81 milliGRAM(s) Oral daily  dextrose 5%. 1000 milliLiter(s) (50 mL/Hr) IV Continuous <Continuous>  dextrose 5%. 1000 milliLiter(s) (50 mL/Hr) IV Continuous <Continuous>  dextrose 50% Injectable 12.5 Gram(s) IV Push once  dextrose 50% Injectable 25 Gram(s) IV Push once  dextrose 50% Injectable 25 Gram(s) IV Push once  dextrose 50% Injectable 12.5 Gram(s) IV Push once  dextrose 50% Injectable 25 Gram(s) IV Push once  dextrose 50% Injectable 25 Gram(s) IV Push once  docusate sodium 100 milliGRAM(s) Oral three times a day  enoxaparin Injectable 40 milliGRAM(s) SubCutaneous daily  famotidine    Tablet 20 milliGRAM(s) Oral two times a day  gabapentin 300 milliGRAM(s) Oral two times a day  insulin lispro (HumaLOG) corrective regimen sliding scale   SubCutaneous at bedtime  insulin lispro (HumaLOG) corrective regimen sliding scale   SubCutaneous three times a day before meals  levothyroxine 25 MICROGram(s) Oral daily  losartan 100 milliGRAM(s) Oral daily  methocarbamol 750 milliGRAM(s) Oral every 8 hours  methylnaltrexone Injectable 8 milliGRAM(s) SubCutaneous once  metoprolol tartrate 50 milliGRAM(s) Oral two times a day  senna 2 Tablet(s) Oral at bedtime  simvastatin 20 milliGRAM(s) Oral at bedtime  tamsulosin 0.4 milliGRAM(s) Oral daily      Allergies    No Known Allergies    Intolerances        SOCIAL HISTORY:  Denies ETOh,Smoking,     FAMILY HISTORY:  Family history of heart disease (Sibling)      REVIEW OF SYSTEMS:    CONSTITUTIONAL: No weakness, fevers or chills  EYES/ENT: No visual changes;  No vertigo or throat pain   NECK: No pain or stiffness  RESPIRATORY: No cough, wheezing, hemoptysis; No shortness of breath  CARDIOVASCULAR: No chest pain or palpitations  GENITOURINARY: No dysuria, frequency or hematuria  NEUROLOGICAL: No numbness or weakness  SKIN: No itching, burning, rashes, or lesions   All other review of systems is negative unless indicated above.    VITAL:  T(C): , Max: 37.3 (05-01-18 @ 17:05)  T(F): , Max: 99.2 (05-01-18 @ 17:05)  HR: 95 (05-01-18 @ 17:05)  BP: 184/80 (05-01-18 @ 17:05)  BP(mean): 115 (04-30-18 @ 19:00)  RR: 18 (05-01-18 @ 17:05)  SpO2: 94% (05-01-18 @ 17:05)  Wt(kg): --    I and O's:    04-30 @ 07:01  -  05-01 @ 07:00  --------------------------------------------------------  IN: 2775 mL / OUT: 2585 mL / NET: 190 mL    05-01 @ 07:01  -  05-01 @ 17:25  --------------------------------------------------------  IN: 230 mL / OUT: 2220 mL / NET: -1990 mL          PHYSICAL EXAM:    Constitutional: NAD  HEENT: PERRLA,   Neck: No JVD  Respiratory: CTA B/L  Cardiovascular: S1 and S2  Gastrointestinal: BS+, softly distended, active BS, non-peritoneal  rectal-no impaction, rectal tube placed    LABS:                        10.6   6.91  )-----------( 267      ( 01 May 2018 07:55 )             31.0     05-01    136  |  97  |  7   ----------------------------<  197<H>  4.0   |  26  |  0.87    Ca    8.8      01 May 2018 06:09            RADIOLOGY & ADDITIONAL STUDIES:

## 2018-05-01 NOTE — PROGRESS NOTE ADULT - ASSESSMENT
72 year old male with PMH of HTN, CAD with stents x 7 last 2 stents from 2014, Dm2, BPH, GERD with complaints of lower back pain x many months worse x 2-3 months planned for Left L3 Decompressive hemilaminectomy, excision of synovial cyst.       - pain control , PT     Constipation - aggressive bowel regimen , + flatus , as per pts family -pt suffers with constipation all the time    Uncontrolled htn - cont losartan+ metoprolol    DM2- iss    discussed management plan with pt

## 2018-05-01 NOTE — PROGRESS NOTE ADULT - ASSESSMENT
72 year old male with PMH of HTN, CAD with stents x 7 last 2 stents from 2014, Dm2, BPH, GERD with complaints of lower back pain x many months worse x 2-3 months planned for Left L3 Decompressive hemilaminectomy, excision of synovial cyst.     PAST MEDICAL & SURGICAL HISTORY:  Lumbar radiculopathy  OA (osteoarthritis)  Diverticulitis  MI (myocardial infarction): 12/2014  Lumbar disc disorder  Benign hypertrophy of prostate  Thyroid disease  Depression  Stented coronary artery  CAD (coronary artery disease): cardiac stents x7  Diabetes: type 2  Hyperlipidemia  Hypertension  H/O heart artery stent  History of back surgery: lumbar  History of cardiac catheterization: 2010 2 stents, 2012 3 stents, 12/2014 2 coronary artery stents  S/P shoulder surgery: s/p R rotator cuff surgery  S/P knee replacement, bilateral    PROCEDURE: 4/30/18 s/p left L3 Lumbar laminectomy, synovial cyst, and extension of fusion L3-5    PLAN:  Neuro: cont pain meds prn, reminded him to ask for them, will add robaxin for muscle relaxant, increase OOB/ambulation, maintain drains and record output  Respiratory: patient instructed on incentive spirometer  CV: restarted ASA for h/o CAD and stents and MI 2014; cont losartan and metoprolol for HTN  Endocrine: cont HISS for type 2 DM, may need to add premeal dosing, will monitor          DVT ppx: [] SQH [x] SQL and venodynes bilaterally  Renal: will IVL today, maintain shoemaker for today given pain  GI: bowel regimen   PT/OT: TBD  f/u am labs    Will discuss with Dr Banegas  [facility] # [id number] 72 year old male with PMH of HTN, CAD with stents x 7 last 2 stents from 2014, Dm2, BPH, GERD with complaints of lower back pain x many months worse x 2-3 months planned for Left L3 Decompressive hemilaminectomy, excision of synovial cyst.     PAST MEDICAL & SURGICAL HISTORY:  Lumbar radiculopathy  OA (osteoarthritis)  Diverticulitis  MI (myocardial infarction): 12/2014  Lumbar disc disorder  Benign hypertrophy of prostate  Thyroid disease  Depression  Stented coronary artery  CAD (coronary artery disease): cardiac stents x7  Diabetes: type 2  Hyperlipidemia  Hypertension  H/O heart artery stent  History of back surgery: lumbar  History of cardiac catheterization: 2010 2 stents, 2012 3 stents, 12/2014 2 coronary artery stents  S/P shoulder surgery: s/p R rotator cuff surgery  S/P knee replacement, bilateral    PROCEDURE: 4/30/18 s/p left L3 Lumbar laminectomy, synovial cyst, and extension of fusion L3-5    PLAN:  Neuro: cont pain meds prn, reminded him to ask for them, will add robaxin for muscle relaxant, increase OOB/ambulation, maintain drains and record output  Respiratory: patient instructed on incentive spirometer  CV: restarted ASA for h/o CAD and stents and MI 2014; cont losartan and metoprolol for HTN  Endocrine: cont HISS for type 2 DM, may need to add premeal dosing, will monitor          DVT ppx: [] SQH [x] SQL and venodynes bilaterally  Renal: will IVL today, maintain shoemaker for today given pain  GI: bowel regimen   PT/OT: TBD  f/u am labs  Dr Burr [phone] called to see patient at request of his private doctor who doesn't come here.     Will discuss with Dr Banegas  [facility] # [id number] 72 year old male with PMH of HTN, CAD with stents x 7 last 2 stents from 2014, Dm2, BPH, GERD with complaints of lower back pain x many months worse x 2-3 months planned for Left L3 Decompressive hemilaminectomy, excision of synovial cyst.     PAST MEDICAL & SURGICAL HISTORY:  Lumbar radiculopathy  OA (osteoarthritis)  Diverticulitis  MI (myocardial infarction): 12/2014  Lumbar disc disorder  Benign hypertrophy of prostate  Thyroid disease  Depression  Stented coronary artery  CAD (coronary artery disease): cardiac stents x7  Diabetes: type 2  Hyperlipidemia  Hypertension  H/O heart artery stent  History of back surgery: lumbar  History of cardiac catheterization: 2010 2 stents, 2012 3 stents, 12/2014 2 coronary artery stents  S/P shoulder surgery: s/p R rotator cuff surgery  S/P knee replacement, bilateral    PROCEDURE: 4/30/18 s/p left L3 Lumbar laminectomy, synovial cyst, and extension of fusion L3-5    PLAN:  Neuro: cont pain meds prn, reminded him to ask for them, will add robaxin for muscle relaxant, increase OOB/ambulation, maintain drains and record output  Respiratory: patient instructed on incentive spirometer  CV: restarted ASA for h/o CAD and stents and MI 2014; cont losartan and metoprolol for HTN  Endocrine: cont HISS for type 2 DM, may need to add premeal dosing, will monitor          DVT ppx: [] SQH [x] SQL and venodynes bilaterally  Renal: will IVL today, maintain shoemaker for today given pain  GI: bowel regimen; monitor distention/BM  PT/OT: TBD  f/u am labs  Dr Burr [phone] called to see patient at request of his private doctor who doesn't come here.     [id number]

## 2018-05-01 NOTE — PHYSICAL THERAPY INITIAL EVALUATION ADULT - GAIT DEVIATIONS NOTED, PT EVAL
increased time in double stance/decreased stride length/decreased ant/decreased velocity of limb motion/decreased step length/decreased weight-shifting ability

## 2018-05-01 NOTE — PROGRESS NOTE ADULT - SUBJECTIVE AND OBJECTIVE BOX
chief complaint : constipation, elevtaed bp        SUBJECTIVE / OVERNIGHT EVENTS:s/p left L3 Lumbar laminectomy, synovial cyst, and extension of fusion L3-5, pt says his pain is well controlled, c/o constipation, + flatus      MEDICATIONS  (STANDING):  artificial  tears Solution 1 Drop(s) Both EYES daily  aspirin enteric coated 81 milliGRAM(s) Oral daily  dextrose 5%. 1000 milliLiter(s) (50 mL/Hr) IV Continuous <Continuous>  dextrose 5%. 1000 milliLiter(s) (50 mL/Hr) IV Continuous <Continuous>  dextrose 50% Injectable 12.5 Gram(s) IV Push once  dextrose 50% Injectable 25 Gram(s) IV Push once  dextrose 50% Injectable 25 Gram(s) IV Push once  dextrose 50% Injectable 12.5 Gram(s) IV Push once  dextrose 50% Injectable 25 Gram(s) IV Push once  dextrose 50% Injectable 25 Gram(s) IV Push once  docusate sodium 100 milliGRAM(s) Oral three times a day  enoxaparin Injectable 40 milliGRAM(s) SubCutaneous daily  famotidine    Tablet 20 milliGRAM(s) Oral two times a day  gabapentin 300 milliGRAM(s) Oral two times a day  insulin lispro (HumaLOG) corrective regimen sliding scale   SubCutaneous at bedtime  insulin lispro (HumaLOG) corrective regimen sliding scale   SubCutaneous three times a day before meals  levothyroxine 25 MICROGram(s) Oral daily  losartan 100 milliGRAM(s) Oral daily  methocarbamol 750 milliGRAM(s) Oral every 8 hours  methylnaltrexone Injectable 12 milliGRAM(s) SubCutaneous once  metoprolol tartrate 50 milliGRAM(s) Oral two times a day  senna 2 Tablet(s) Oral at bedtime  simvastatin 20 milliGRAM(s) Oral at bedtime  tamsulosin 0.4 milliGRAM(s) Oral daily    MEDICATIONS  (PRN):  acetaminophen   Tablet 650 milliGRAM(s) Oral every 6 hours PRN For Temp greater than 38 C (100.4 F)  acetaminophen   Tablet. 650 milliGRAM(s) Oral every 6 hours PRN Mild Pain (1 - 3)  bisacodyl Suppository 10 milliGRAM(s) Rectal daily PRN Constipation  dextrose Gel 1 Dose(s) Oral once PRN Blood Glucose LESS THAN 70 milliGRAM(s)/deciliter  dextrose Gel 1 Dose(s) Oral once PRN Blood Glucose LESS THAN 70 milliGRAM(s)/deciliter  glucagon  Injectable 1 milliGRAM(s) IntraMuscular once PRN Glucose LESS THAN 70 milligrams/deciliter  glucagon  Injectable 1 milliGRAM(s) IntraMuscular once PRN Glucose LESS THAN 70 milligrams/deciliter  HYDROmorphone  Injectable 0.5 milliGRAM(s) IV Push every 6 hours PRN Breakthrough pain  ondansetron Injectable 4 milliGRAM(s) IV Push every 6 hours PRN Nausea  oxyCODONE    IR 10 milliGRAM(s) Oral every 4 hours PRN Moderate Pain (4 - 6)  oxyCODONE    IR 15 milliGRAM(s) Oral every 4 hours PRN Severe Pain (7 - 10)  simethicone 80 milliGRAM(s) Chew every 6 hours PRN Gas        CAPILLARY BLOOD GLUCOSE      POCT Blood Glucose.: 144 mg/dL (01 May 2018 21:51)  POCT Blood Glucose.: 194 mg/dL (01 May 2018 17:23)  POCT Blood Glucose.: 206 mg/dL (01 May 2018 13:44)  POCT Blood Glucose.: 173 mg/dL (01 May 2018 10:00)  POCT Blood Glucose.: 186 mg/dL (01 May 2018 08:32)    I&O's Summary    30 Apr 2018 07:01  -  01 May 2018 07:00  --------------------------------------------------------  IN: 2775 mL / OUT: 2585 mL / NET: 190 mL    01 May 2018 07:01  -  01 May 2018 22:47  --------------------------------------------------------  IN: 230 mL / OUT: 2810 mL / NET: -2580 mL    Vital Signs Last 24 Hrs  T(C): 36.6 (01 May 2018 20:21), Max: 37.3 (01 May 2018 17:05)  T(F): 97.9 (01 May 2018 20:21), Max: 99.2 (01 May 2018 17:05)  HR: 78 (01 May 2018 20:21) (67 - 95)  BP: 140/60 (01 May 2018 19:46) (140/60 - 210/84)  BP(mean): --  RR: 18 (01 May 2018 20:21) (18 - 18)  SpO2: 95% (01 May 2018 20:21) (93% - 96%)    Constitutional: No fever, fatigue  Skin: No rash.  Eyes: No recent vision problems or eye pain.  ENT: No congestion, ear pain, or sore throat.  Cardiovascular: No chest pain or palpation.  Respiratory: No cough, shortness of breath, congestion, or wheezing.  Gastrointestinal: No abdominal pain, nausea, vomiting, or diarrhea.constiaption+  Genitourinary: No dysuria.  Musculoskeletal: No joint swelling.  Neurologic: No headache.    PHYSICAL EXAM:  GENERAL: NAD  EYES: EOMI, PERRLA  NECK: Supple, No JVD  CHEST/LUNG: dec breath sounds at bases   HEART:  S1 , S2 +  ABDOMEN: distension+, bs+, no reboud/ guarding   EXTREMITIES:  no edema  NEUROLOGY:alert awake oriented       LABS:                        10.6   6.91  )-----------( 267      ( 01 May 2018 07:55 )             31.0     05-01    136  |  97  |  7   ----------------------------<  197<H>  4.0   |  26  |  0.87    Ca    8.8      01 May 2018 06:09                RADIOLOGY & ADDITIONAL TESTS:    Imaging Personally Reviewed:    Consultant(s) Notes Reviewed:      Care Discussed with Consultants/Other Providers:

## 2018-05-01 NOTE — CONSULT NOTE ADULT - ASSESSMENT
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

## 2018-05-01 NOTE — PROGRESS NOTE ADULT - SUBJECTIVE AND OBJECTIVE BOX
HPI:    OVERNIGHT EVENTS:  Vital Signs Last 24 Hrs  T(C): 36.8 (01 May 2018 09:00), Max: 36.8 (01 May 2018 09:00)  T(F): 98.2 (01 May 2018 09:00), Max: 98.2 (01 May 2018 09:00)  HR: 81 (01 May 2018 09:00) (67 - 90)  BP: 158/74 (01 May 2018 09:00) (154/70 - 181/74)  BP(mean): 115 (30 Apr 2018 19:00) (109 - 117)  RR: 18 (01 May 2018 09:00) (12 - 18)  SpO2: 96% (01 May 2018 09:00) (93% - 100%)    I&O's Detail    30 Apr 2018 07:01  -  01 May 2018 07:00  --------------------------------------------------------  IN:    IV PiggyBack: 50 mL    Oral Fluid: 1300 mL    sodium chloride 0.9% with potassium chloride 20 mEq/L: 1425 mL  Total IN: 2775 mL    OUT:    Accordian: 190 mL    Accordian: 20 mL    Indwelling Catheter - Urethral: 2375 mL  Total OUT: 2585 mL    Total NET: 190 mL      01 May 2018 07:01  -  01 May 2018 09:18  --------------------------------------------------------  IN:    Oral Fluid: 230 mL  Total IN: 230 mL    OUT:    Indwelling Catheter - Urethral: 550 mL  Total OUT: 550 mL    Total NET: -320 mL        I&O's Summary    30 Apr 2018 07:01  -  01 May 2018 07:00  --------------------------------------------------------  IN: 2775 mL / OUT: 2585 mL / NET: 190 mL    01 May 2018 07:01  -  01 May 2018 09:18  --------------------------------------------------------  IN: 230 mL / OUT: 550 mL / NET: -320 mL        PHYSICAL EXAM:  Neurological:    Motor exam:         [x] Upper extremity                 D             B          T          WE       WF      HI                                               R         5/5        5/5        5/5       5/5     5/5       5/5                                               L          5/5        5/5        5/5       5/5     5/5       5/5         [x] Lower extremity                Ps          Ha        Quad    EHL        FHL                                               R        5/5        5/5        5/5       5/5         5/5                                               L         5/5        5/5       5/5       5/5          5/5                                                        [] warm well perfused; capillary refill <3 seconds     Sensation: [x] intact to light touch  [] decreased:     Gait NT    Cardiovascular:  Respiratory:  Gastrointestinal:  Genitourinary:  Extremities:  Incision/Wound: Clan and dry    TUBES/LINES:  [] CVC  [] A-line  [] Lumbar Drain  [] Ventriculostomy  [] Other    DIET:  [] NPO  [] Mechanical  [] Tube feeds    LABS:                        10.6   6.91  )-----------( 267      ( 01 May 2018 07:55 )             31.0     05-01    136  |  97  |  7   ----------------------------<  197<H>  4.0   |  26  |  0.87    Ca    8.8      01 May 2018 06:09              CAPILLARY BLOOD GLUCOSE      POCT Blood Glucose.: 186 mg/dL (01 May 2018 08:32)  POCT Blood Glucose.: 172 mg/dL (30 Apr 2018 21:43)  POCT Blood Glucose.: 194 mg/dL (30 Apr 2018 16:57)          Drug Levels: [] N/A    CSF Analysis: [] N/A      Allergies    No Known Allergies    Intolerances      MEDICATIONS:  Antibiotics:    Neuro:  acetaminophen   Tablet 650 milliGRAM(s) Oral every 6 hours PRN  acetaminophen   Tablet. 650 milliGRAM(s) Oral every 6 hours PRN  diazepam    Tablet 5 milliGRAM(s) Oral every 12 hours PRN  gabapentin 300 milliGRAM(s) Oral two times a day  HYDROmorphone  Injectable 0.5 milliGRAM(s) IV Push every 6 hours PRN  ondansetron Injectable 4 milliGRAM(s) IV Push every 6 hours PRN  oxyCODONE    IR 10 milliGRAM(s) Oral every 4 hours PRN  oxyCODONE    IR 15 milliGRAM(s) Oral every 4 hours PRN    Anticoagulation:  aspirin enteric coated 81 milliGRAM(s) Oral daily  enoxaparin Injectable 40 milliGRAM(s) SubCutaneous daily    OTHER:  artificial  tears Solution 1 Drop(s) Both EYES daily  dextrose 50% Injectable 12.5 Gram(s) IV Push once  dextrose 50% Injectable 25 Gram(s) IV Push once  dextrose 50% Injectable 25 Gram(s) IV Push once  dextrose 50% Injectable 12.5 Gram(s) IV Push once  dextrose 50% Injectable 25 Gram(s) IV Push once  dextrose 50% Injectable 25 Gram(s) IV Push once  dextrose Gel 1 Dose(s) Oral once PRN  dextrose Gel 1 Dose(s) Oral once PRN  docusate sodium 100 milliGRAM(s) Oral three times a day  famotidine    Tablet 20 milliGRAM(s) Oral two times a day  glucagon  Injectable 1 milliGRAM(s) IntraMuscular once PRN  glucagon  Injectable 1 milliGRAM(s) IntraMuscular once PRN  insulin lispro (HumaLOG) corrective regimen sliding scale   SubCutaneous at bedtime  insulin lispro (HumaLOG) corrective regimen sliding scale   SubCutaneous three times a day before meals  levothyroxine 25 MICROGram(s) Oral daily  losartan 100 milliGRAM(s) Oral daily  metoprolol tartrate 50 milliGRAM(s) Oral two times a day  senna 2 Tablet(s) Oral at bedtime  simvastatin 20 milliGRAM(s) Oral at bedtime  tamsulosin 0.4 milliGRAM(s) Oral daily    IVF:  dextrose 5%. 1000 milliLiter(s) IV Continuous <Continuous>  dextrose 5%. 1000 milliLiter(s) IV Continuous <Continuous>  sodium chloride 0.9% with potassium chloride 20 mEq/L 1000 milliLiter(s) IV Continuous <Continuous>    CULTURES:    RADIOLOGY & ADDITIONAL TESTS:      ASSESSMENT:  HPI:    72y Male s/p    PLAN:  NEURO:  CARDIOVASCULAR:  PULMONARY:  RENAL:  GI:  HEME:  ID:  ENDO:    DVT PROPHYLAXIS:  [x] Venodynes                                [x] Heparin/Lovenox    FALL RISK:  [] Low Risk                                    [] Impulsive

## 2018-05-01 NOTE — PHYSICAL THERAPY INITIAL EVALUATION ADULT - DISCHARGE DISPOSITION, PT EVAL
home/home w/ assist/home w/ home PT/home with home PT for improved strength balance & endurance for activity, recommend rolling walker, assist from family as necessary

## 2018-05-01 NOTE — PROGRESS NOTE ADULT - SUBJECTIVE AND OBJECTIVE BOX
CHIEF COMPLAINT: patient c/o incisional pain, hasn't walked or been OOB yet so isn't sure his prop pain improved. reports pain meds helping with his pain when he takes them    Vital Signs Last 24 Hrs  T(C): 36.8 (01 May 2018 09:00), Max: 36.8 (01 May 2018 09:00)  T(F): 98.2 (01 May 2018 09:00), Max: 98.2 (01 May 2018 09:00)  HR: 81 (01 May 2018 09:00) (67 - 90)  BP: 210/84 (01 May 2018 11:16) (154/70 - 210/84)  BP(mean): 115 (30 Apr 2018 19:00) (109 - 117)  RR: 18 (01 May 2018 09:00) (12 - 18)  SpO2: 96% (01 May 2018 09:00) (93% - 100%)    DRAINS: [] SUAD (cc/24h) [x] HMV (190cc/24h left; 20cc/24hr right)    PHYSICAL EXAM:    Constitutional: No Acute Distress     Neurological: AOx3, Following Commands, Moving all Extremities     Motor exam:          Upper extremity                         Delt     Bicep     Tricep    HG                                                 R         5/5        5/5        5/5       5/5                                               L          5/5        5/5        5/5       5/5          Lower extremity                        HF         KF        KE       DF         PF                                                  R        5/5        5/5        5/5       5/5         5/5                                               L         5/5        5/5       5/5       5/5          5/5                                                 Sensation: [x] intact to light touch  [] decreased:     Pulmonary: Clear to Auscultation, No rales, No rhonchi, No wheezes     Cardiovascular: S1, S2, Regular rate and rhythm     Gastrointestinal: Soft, Non-tender, Non-distended     Extremities: No calf tenderness     Incision: c/d/i + drains x2    LABS:                        10.6   6.91  )-----------( 267      ( 01 May 2018 07:55 )             31.0    05-01    136  |  97  |  7   ----------------------------<  197<H>  4.0   |  26  |  0.87    Ca    8.8      01 May 2018 06:09    HGA1C 6.2    MEDICATIONS:    Neuro:  acetaminophen   Tablet 650 milliGRAM(s) Oral every 6 hours PRN For Temp greater than 38 C (100.4 F)  acetaminophen   Tablet. 650 milliGRAM(s) Oral every 6 hours PRN Mild Pain (1 - 3)  diazepam    Tablet 5 milliGRAM(s) Oral every 12 hours PRN Anxiety  gabapentin 300 milliGRAM(s) Oral two times a day  HYDROmorphone  Injectable 0.5 milliGRAM(s) IV Push every 6 hours PRN Breakthrough pain  ondansetron Injectable 4 milliGRAM(s) IV Push every 6 hours PRN Nausea  oxyCODONE    IR 10 milliGRAM(s) Oral every 4 hours PRN Moderate Pain (4 - 6)  oxyCODONE    IR 15 milliGRAM(s) Oral every 4 hours PRN Severe Pain (7 - 10)    Cardiac:  losartan 100 milliGRAM(s) Oral daily  metoprolol tartrate 50 milliGRAM(s) Oral two times a day  tamsulosin 0.4 milliGRAM(s) Oral daily    GI/:  docusate sodium 100 milliGRAM(s) Oral three times a day  famotidine    Tablet 20 milliGRAM(s) Oral two times a day  senna 2 Tablet(s) Oral at bedtime    Other:   artificial  tears Solution 1 Drop(s) Both EYES daily  aspirin enteric coated 81 milliGRAM(s) Oral daily  dextrose 5%. 1000 milliLiter(s) IV Continuous <Continuous>  dextrose 5%. 1000 milliLiter(s) IV Continuous <Continuous>  dextrose 50% Injectable 12.5 Gram(s) IV Push once  dextrose 50% Injectable 25 Gram(s) IV Push once  dextrose 50% Injectable 25 Gram(s) IV Push once  dextrose 50% Injectable 12.5 Gram(s) IV Push once  dextrose 50% Injectable 25 Gram(s) IV Push once  dextrose 50% Injectable 25 Gram(s) IV Push once  dextrose Gel 1 Dose(s) Oral once PRN Blood Glucose LESS THAN 70 milliGRAM(s)/deciliter  dextrose Gel 1 Dose(s) Oral once PRN Blood Glucose LESS THAN 70 milliGRAM(s)/deciliter  enoxaparin Injectable 40 milliGRAM(s) SubCutaneous daily  glucagon  Injectable 1 milliGRAM(s) IntraMuscular once PRN Glucose LESS THAN 70 milligrams/deciliter  glucagon  Injectable 1 milliGRAM(s) IntraMuscular once PRN Glucose LESS THAN 70 milligrams/deciliter  insulin lispro (HumaLOG) corrective regimen sliding scale   SubCutaneous at bedtime  insulin lispro (HumaLOG) corrective regimen sliding scale   SubCutaneous three times a day before meals  levothyroxine 25 MICROGram(s) Oral daily  simvastatin 20 milliGRAM(s) Oral at bedtime  sodium chloride 0.9% with potassium chloride 20 mEq/L 1000 milliLiter(s) IV Continuous <Continuous>    DIET: [] Regular [x] CCD [] Renal [] Puree [] Dysphagia [] Tube Feeds:   Halal CHIEF COMPLAINT: patient c/o incisional pain, hasn't walked or been OOB yet so isn't sure his prop pain improved. reports pain meds helping with his pain when he takes them    Vital Signs Last 24 Hrs  T(C): 36.8 (01 May 2018 09:00), Max: 36.8 (01 May 2018 09:00)  T(F): 98.2 (01 May 2018 09:00), Max: 98.2 (01 May 2018 09:00)  HR: 81 (01 May 2018 09:00) (67 - 90)  BP: 210/84 (01 May 2018 11:16) (154/70 - 210/84)  BP(mean): 115 (30 Apr 2018 19:00) (109 - 117)  RR: 18 (01 May 2018 09:00) (12 - 18)  SpO2: 96% (01 May 2018 09:00) (93% - 100%)    DRAINS: [] SUAD (cc/24h) [x] HMV (190cc/24h left; 20cc/24hr right)    PHYSICAL EXAM:    Constitutional: No Acute Distress     Neurological: AOx3, Following Commands, Moving all Extremities     Motor exam:          Upper extremity                         Delt     Bicep     Tricep    HG                                                 R         5/5        5/5        5/5       5/5                                               L          5/5        5/5        5/5       5/5          Lower extremity                        HF         KF        KE       DF         PF                                                  R        5/5        5/5        5/5       5/5         5/5                                               L         5/5        5/5       5/5       5/5          5/5                                                 Sensation: [x] intact to light touch  [] decreased:     Pulmonary: Clear to Auscultation, No rales, No rhonchi, No wheezes     Cardiovascular: S1, S2, Regular rate and rhythm     Gastrointestinal: Soft, Non-tender, +distended; decreased bowel sounds throughout    Extremities: No calf tenderness     Incision: c/d/i + drains x2    LABS:                        10.6   6.91  )-----------( 267      ( 01 May 2018 07:55 )             31.0    05-01    136  |  97  |  7   ----------------------------<  197<H>  4.0   |  26  |  0.87    Ca    8.8      01 May 2018 06:09    HGA1C 6.2    MEDICATIONS:    Neuro:  acetaminophen   Tablet 650 milliGRAM(s) Oral every 6 hours PRN For Temp greater than 38 C (100.4 F)  acetaminophen   Tablet. 650 milliGRAM(s) Oral every 6 hours PRN Mild Pain (1 - 3)  diazepam    Tablet 5 milliGRAM(s) Oral every 12 hours PRN Anxiety  gabapentin 300 milliGRAM(s) Oral two times a day  HYDROmorphone  Injectable 0.5 milliGRAM(s) IV Push every 6 hours PRN Breakthrough pain  ondansetron Injectable 4 milliGRAM(s) IV Push every 6 hours PRN Nausea  oxyCODONE    IR 10 milliGRAM(s) Oral every 4 hours PRN Moderate Pain (4 - 6)  oxyCODONE    IR 15 milliGRAM(s) Oral every 4 hours PRN Severe Pain (7 - 10)    Cardiac:  losartan 100 milliGRAM(s) Oral daily  metoprolol tartrate 50 milliGRAM(s) Oral two times a day  tamsulosin 0.4 milliGRAM(s) Oral daily    GI/:  docusate sodium 100 milliGRAM(s) Oral three times a day  famotidine    Tablet 20 milliGRAM(s) Oral two times a day  senna 2 Tablet(s) Oral at bedtime    Other:   artificial  tears Solution 1 Drop(s) Both EYES daily  aspirin enteric coated 81 milliGRAM(s) Oral daily  dextrose 5%. 1000 milliLiter(s) IV Continuous <Continuous>  dextrose 5%. 1000 milliLiter(s) IV Continuous <Continuous>  dextrose 50% Injectable 12.5 Gram(s) IV Push once  dextrose 50% Injectable 25 Gram(s) IV Push once  dextrose 50% Injectable 25 Gram(s) IV Push once  dextrose 50% Injectable 12.5 Gram(s) IV Push once  dextrose 50% Injectable 25 Gram(s) IV Push once  dextrose 50% Injectable 25 Gram(s) IV Push once  dextrose Gel 1 Dose(s) Oral once PRN Blood Glucose LESS THAN 70 milliGRAM(s)/deciliter  dextrose Gel 1 Dose(s) Oral once PRN Blood Glucose LESS THAN 70 milliGRAM(s)/deciliter  enoxaparin Injectable 40 milliGRAM(s) SubCutaneous daily  glucagon  Injectable 1 milliGRAM(s) IntraMuscular once PRN Glucose LESS THAN 70 milligrams/deciliter  glucagon  Injectable 1 milliGRAM(s) IntraMuscular once PRN Glucose LESS THAN 70 milligrams/deciliter  insulin lispro (HumaLOG) corrective regimen sliding scale   SubCutaneous at bedtime  insulin lispro (HumaLOG) corrective regimen sliding scale   SubCutaneous three times a day before meals  levothyroxine 25 MICROGram(s) Oral daily  simvastatin 20 milliGRAM(s) Oral at bedtime  sodium chloride 0.9% with potassium chloride 20 mEq/L 1000 milliLiter(s) IV Continuous <Continuous>    DIET: [] Regular [x] CCD [] Renal [] Puree [] Dysphagia [] Tube Feeds:   Halal

## 2018-05-01 NOTE — PHYSICAL THERAPY INITIAL EVALUATION ADULT - GENERAL OBSERVATIONS, REHAB EVAL
Pt received semisupine in bed, A&Ox4, following all commands, eager to participate, spouse at bedside, +hemovac x2, +shoemaker

## 2018-05-01 NOTE — PHYSICAL THERAPY INITIAL EVALUATION ADULT - ADDITIONAL COMMENTS
Pt resides in private home with spouse, 3 steps to enter with handrail, first floor. PTA independent in mobility and ADL's, owns rolling walker.

## 2018-05-02 LAB
ANION GAP SERPL CALC-SCNC: 10 MMOL/L — SIGNIFICANT CHANGE UP (ref 5–17)
ANION GAP SERPL CALC-SCNC: 16 MMOL/L — SIGNIFICANT CHANGE UP (ref 5–17)
BASOPHILS # BLD AUTO: 0 K/UL — SIGNIFICANT CHANGE UP (ref 0–0.2)
BASOPHILS NFR BLD AUTO: 0 % — SIGNIFICANT CHANGE UP (ref 0–2)
BUN SERPL-MCNC: 10 MG/DL — SIGNIFICANT CHANGE UP (ref 7–23)
BUN SERPL-MCNC: 10 MG/DL — SIGNIFICANT CHANGE UP (ref 7–23)
CALCIUM SERPL-MCNC: 8.5 MG/DL — SIGNIFICANT CHANGE UP (ref 8.4–10.5)
CALCIUM SERPL-MCNC: 8.8 MG/DL — SIGNIFICANT CHANGE UP (ref 8.4–10.5)
CHLORIDE SERPL-SCNC: 96 MMOL/L — SIGNIFICANT CHANGE UP (ref 96–108)
CHLORIDE SERPL-SCNC: 97 MMOL/L — SIGNIFICANT CHANGE UP (ref 96–108)
CO2 SERPL-SCNC: 25 MMOL/L — SIGNIFICANT CHANGE UP (ref 22–31)
CO2 SERPL-SCNC: 29 MMOL/L — SIGNIFICANT CHANGE UP (ref 22–31)
CREAT SERPL-MCNC: 0.8 MG/DL — SIGNIFICANT CHANGE UP (ref 0.5–1.3)
CREAT SERPL-MCNC: 1.01 MG/DL — SIGNIFICANT CHANGE UP (ref 0.5–1.3)
EOSINOPHIL # BLD AUTO: 0 K/UL — SIGNIFICANT CHANGE UP (ref 0–0.5)
EOSINOPHIL NFR BLD AUTO: 0 % — SIGNIFICANT CHANGE UP (ref 0–6)
GLUCOSE BLDC GLUCOMTR-MCNC: 168 MG/DL — HIGH (ref 70–99)
GLUCOSE BLDC GLUCOMTR-MCNC: 172 MG/DL — HIGH (ref 70–99)
GLUCOSE BLDC GLUCOMTR-MCNC: 180 MG/DL — HIGH (ref 70–99)
GLUCOSE BLDC GLUCOMTR-MCNC: 225 MG/DL — HIGH (ref 70–99)
GLUCOSE BLDC GLUCOMTR-MCNC: 231 MG/DL — HIGH (ref 70–99)
GLUCOSE SERPL-MCNC: 194 MG/DL — HIGH (ref 70–99)
GLUCOSE SERPL-MCNC: 258 MG/DL — HIGH (ref 70–99)
HCT VFR BLD CALC: 31.3 % — LOW (ref 39–50)
HGB BLD-MCNC: 10.6 G/DL — LOW (ref 13–17)
LYMPHOCYTES # BLD AUTO: 0.77 K/UL — LOW (ref 1–3.3)
LYMPHOCYTES # BLD AUTO: 13 % — SIGNIFICANT CHANGE UP (ref 13–44)
MAGNESIUM SERPL-MCNC: 1.4 MG/DL — LOW (ref 1.6–2.6)
MCHC RBC-ENTMCNC: 32.7 PG — SIGNIFICANT CHANGE UP (ref 27–34)
MCHC RBC-ENTMCNC: 33.9 GM/DL — SIGNIFICANT CHANGE UP (ref 32–36)
MCV RBC AUTO: 96.6 FL — SIGNIFICANT CHANGE UP (ref 80–100)
MONOCYTES # BLD AUTO: 0.53 K/UL — SIGNIFICANT CHANGE UP (ref 0–0.9)
MONOCYTES NFR BLD AUTO: 9 % — SIGNIFICANT CHANGE UP (ref 2–14)
NEUTROPHILS # BLD AUTO: 4.63 K/UL — SIGNIFICANT CHANGE UP (ref 1.8–7.4)
NEUTROPHILS NFR BLD AUTO: 77 % — SIGNIFICANT CHANGE UP (ref 43–77)
PLATELET # BLD AUTO: 250 K/UL — SIGNIFICANT CHANGE UP (ref 150–400)
POTASSIUM SERPL-MCNC: 3.3 MMOL/L — LOW (ref 3.5–5.3)
POTASSIUM SERPL-MCNC: 3.6 MMOL/L — SIGNIFICANT CHANGE UP (ref 3.5–5.3)
POTASSIUM SERPL-SCNC: 3.3 MMOL/L — LOW (ref 3.5–5.3)
POTASSIUM SERPL-SCNC: 3.6 MMOL/L — SIGNIFICANT CHANGE UP (ref 3.5–5.3)
RBC # BLD: 3.24 M/UL — LOW (ref 4.2–5.8)
RBC # FLD: 13.7 % — SIGNIFICANT CHANGE UP (ref 10.3–14.5)
SODIUM SERPL-SCNC: 135 MMOL/L — SIGNIFICANT CHANGE UP (ref 135–145)
SODIUM SERPL-SCNC: 138 MMOL/L — SIGNIFICANT CHANGE UP (ref 135–145)
WBC # BLD: 5.94 K/UL — SIGNIFICANT CHANGE UP (ref 3.8–10.5)
WBC # FLD AUTO: 5.94 K/UL — SIGNIFICANT CHANGE UP (ref 3.8–10.5)

## 2018-05-02 RX ORDER — SOD SULF/SODIUM/NAHCO3/KCL/PEG
4000 SOLUTION, RECONSTITUTED, ORAL ORAL ONCE
Qty: 0 | Refills: 0 | Status: COMPLETED | OUTPATIENT
Start: 2018-05-02 | End: 2018-05-02

## 2018-05-02 RX ORDER — LABETALOL HCL 100 MG
10 TABLET ORAL ONCE
Qty: 0 | Refills: 0 | Status: COMPLETED | OUTPATIENT
Start: 2018-05-02 | End: 2018-05-02

## 2018-05-02 RX ORDER — GABAPENTIN 400 MG/1
200 CAPSULE ORAL
Qty: 0 | Refills: 0 | Status: DISCONTINUED | OUTPATIENT
Start: 2018-05-02 | End: 2018-05-04

## 2018-05-02 RX ORDER — ACETAMINOPHEN 500 MG
1000 TABLET ORAL ONCE
Qty: 0 | Refills: 0 | Status: DISCONTINUED | OUTPATIENT
Start: 2018-05-02 | End: 2018-05-02

## 2018-05-02 RX ORDER — QUETIAPINE FUMARATE 200 MG/1
50 TABLET, FILM COATED ORAL AT BEDTIME
Qty: 0 | Refills: 0 | Status: DISCONTINUED | OUTPATIENT
Start: 2018-05-02 | End: 2018-05-04

## 2018-05-02 RX ORDER — SODIUM CHLORIDE 9 MG/ML
1000 INJECTION, SOLUTION INTRAVENOUS
Qty: 0 | Refills: 0 | Status: DISCONTINUED | OUTPATIENT
Start: 2018-05-02 | End: 2018-05-02

## 2018-05-02 RX ORDER — DEXTROSE MONOHYDRATE, SODIUM CHLORIDE, AND POTASSIUM CHLORIDE 50; .745; 4.5 G/1000ML; G/1000ML; G/1000ML
1000 INJECTION, SOLUTION INTRAVENOUS
Qty: 0 | Refills: 0 | Status: DISCONTINUED | OUTPATIENT
Start: 2018-05-02 | End: 2018-05-03

## 2018-05-02 RX ORDER — INSULIN GLARGINE 100 [IU]/ML
10 INJECTION, SOLUTION SUBCUTANEOUS AT BEDTIME
Qty: 0 | Refills: 0 | Status: DISCONTINUED | OUTPATIENT
Start: 2018-05-02 | End: 2018-05-02

## 2018-05-02 RX ORDER — METOPROLOL TARTRATE 50 MG
5 TABLET ORAL EVERY 6 HOURS
Qty: 0 | Refills: 0 | Status: DISCONTINUED | OUTPATIENT
Start: 2018-05-02 | End: 2018-05-04

## 2018-05-02 RX ORDER — RANOLAZINE 500 MG/1
500 TABLET, FILM COATED, EXTENDED RELEASE ORAL
Qty: 0 | Refills: 0 | Status: DISCONTINUED | OUTPATIENT
Start: 2018-05-02 | End: 2018-05-02

## 2018-05-02 RX ORDER — ACETAMINOPHEN 500 MG
1000 TABLET ORAL ONCE
Qty: 0 | Refills: 0 | Status: COMPLETED | OUTPATIENT
Start: 2018-05-02 | End: 2018-05-02

## 2018-05-02 RX ADMIN — Medication 4: at 09:42

## 2018-05-02 RX ADMIN — Medication 400 MILLIGRAM(S): at 14:24

## 2018-05-02 RX ADMIN — TAMSULOSIN HYDROCHLORIDE 0.4 MILLIGRAM(S): 0.4 CAPSULE ORAL at 11:22

## 2018-05-02 RX ADMIN — Medication 81 MILLIGRAM(S): at 11:22

## 2018-05-02 RX ADMIN — Medication 1 DROP(S): at 11:27

## 2018-05-02 RX ADMIN — Medication 4000 MILLILITER(S): at 09:29

## 2018-05-02 RX ADMIN — Medication 1000 MILLIGRAM(S): at 14:54

## 2018-05-02 RX ADMIN — GABAPENTIN 300 MILLIGRAM(S): 400 CAPSULE ORAL at 06:02

## 2018-05-02 RX ADMIN — FAMOTIDINE 20 MILLIGRAM(S): 10 INJECTION INTRAVENOUS at 06:02

## 2018-05-02 RX ADMIN — Medication 5 MILLIGRAM(S): at 17:40

## 2018-05-02 RX ADMIN — Medication 100 MILLIGRAM(S): at 13:39

## 2018-05-02 RX ADMIN — OXYCODONE HYDROCHLORIDE 10 MILLIGRAM(S): 5 TABLET ORAL at 11:22

## 2018-05-02 RX ADMIN — Medication 10 MILLIGRAM(S): at 22:44

## 2018-05-02 RX ADMIN — Medication 4: at 14:24

## 2018-05-02 RX ADMIN — Medication 50 MILLIGRAM(S): at 06:02

## 2018-05-02 RX ADMIN — Medication 2: at 19:21

## 2018-05-02 RX ADMIN — ENOXAPARIN SODIUM 40 MILLIGRAM(S): 100 INJECTION SUBCUTANEOUS at 11:23

## 2018-05-02 RX ADMIN — Medication 25 MICROGRAM(S): at 06:02

## 2018-05-02 RX ADMIN — METHOCARBAMOL 750 MILLIGRAM(S): 500 TABLET, FILM COATED ORAL at 13:39

## 2018-05-02 RX ADMIN — Medication 100 MILLIGRAM(S): at 06:02

## 2018-05-02 RX ADMIN — LOSARTAN POTASSIUM 100 MILLIGRAM(S): 100 TABLET, FILM COATED ORAL at 06:02

## 2018-05-02 RX ADMIN — OXYCODONE HYDROCHLORIDE 10 MILLIGRAM(S): 5 TABLET ORAL at 11:54

## 2018-05-02 RX ADMIN — METHOCARBAMOL 750 MILLIGRAM(S): 500 TABLET, FILM COATED ORAL at 06:02

## 2018-05-02 NOTE — CHART NOTE - NSCHARTNOTEFT_GEN_A_CORE
Called by RN, patient with abdominal discomfort, abdominal  distention with an episode of  vomiting later  Abdomen- semisoft not rigid distended very hypoactive BS, tympanic  Cohen cath inserted . Clear yellow urine 400cc.  Instructed to stop Golytely,  NGT placed for  decompression  Gen surg consult called. NO reglan as per Gen surg. NPO for now. IVF for hydration . BMP , Mg sent   D/C narcotics. IV tylenol for pain mngt  Explained to patient , wife at bedside , Spoke to son Jerson over phone  Will d/w dr Banegas

## 2018-05-02 NOTE — PROGRESS NOTE ADULT - SUBJECTIVE AND OBJECTIVE BOX
SUBJECTIVE:     OVERNIGHT EVENTS: none    Vital Signs Last 24 Hrs  T(C): 36.7 (02 May 2018 09:52), Max: 37.4 (02 May 2018 00:46)  T(F): 98 (02 May 2018 09:52), Max: 99.3 (02 May 2018 00:46)  HR: 86 (02 May 2018 09:52) (72 - 95)  BP: 142/75 (02 May 2018 09:52) (126/70 - 210/84)  BP(mean): --  RR: 18 (02 May 2018 09:52) (18 - 18)  SpO2: 94% (02 May 2018 09:52) (93% - 95%)    PHYSICAL EXAM:    Constitutional: No Acute Distress     Neurological: AOx3, Following Commands, Moving all Extremities 5/5. Sensation intact . Lumbar sutures C/D/I/ HMV x 2 90 & 40cc/ 24 hrs    Pulmonary: Clear to Auscultation,    Cardiovascular: S1, S2, +    Gastrointestinal: Soft, Non tender mild distention     Extremities: No calf tenderness       LABS:                        10.6   5.94  )-----------( 250      ( 02 May 2018 07:33 )             31.3    05-02    135  |  96  |  10  ----------------------------<  258<H>  3.6   |  29  |  1.01    Ca    8.8      02 May 2018 06:17            IMAGING:         MEDICATIONS:    acetaminophen   Tablet. 650 milliGRAM(s) Oral every 6 hours PRN Mild Pain (1 - 3)  gabapentin 300 milliGRAM(s) Oral two times a day  HYDROmorphone  Injectable 0.5 milliGRAM(s) IV Push every 6 hours PRN Breakthrough pain  methocarbamol 750 milliGRAM(s) Oral every 8 hours  ondansetron Injectable 4 milliGRAM(s) IV Push every 6 hours PRN Nausea  oxyCODONE    IR 10 milliGRAM(s) Oral every 4 hours PRN Moderate Pain (4 - 6)  oxyCODONE    IR 15 milliGRAM(s) Oral every 4 hours PRN Severe Pain (7 - 10)  losartan 100 milliGRAM(s) Oral daily  metoprolol tartrate 50 milliGRAM(s) Oral two times a day  tamsulosin 0.4 milliGRAM(s) Oral daily  bisacodyl Suppository 10 milliGRAM(s) Rectal daily PRN Constipation  docusate sodium 100 milliGRAM(s) Oral three times a day  famotidine    Tablet 20 milliGRAM(s) Oral two times a day  senna 2 Tablet(s) Oral at bedtime  simethicone 80 milliGRAM(s) Chew every 6 hours PRN Gas  artificial  tears Solution 1 Drop(s) Both EYES daily  aspirin enteric coated 81 milliGRAM(s) Oral dailyce  enoxaparin Injectable 40 milliGRAM(s) SubCutaneous daily  insulin lispro (HumaLOG) corrective regimen sliding scale   SubCutaneous at bedtime  insulin lispro (HumaLOG) corrective regimen sliding scale   SubCutaneous three times a day before meals  levothyroxine 25 MICROGram(s) Oral daily  simvastatin 20 milliGRAM(s) Oral at bedtime      DIET:

## 2018-05-02 NOTE — CONSULT NOTE ADULT - SUBJECTIVE AND OBJECTIVE BOX
General Surgery Consult Note     HPI: This is a 72 year old male with no prior abdominal surgeries who underwent a  lumbar spine surgery on 04/30/2018 (Lumbar fusion) and now constipated. He has not had a BM in the last 3 days ago. He complains of with abd distention and discomfort. He has been taking oxycodone 15 mg and dilaudid IV consistently. He vomited today, has been passing flatus (little amount). He was given golytely, rectal tube was placed with no improvement. CT was performed, it showed no bowel obstruction, but he has colonic distension, the cecum was about 7 cm.  NGT was placed.     PAST MEDICAL & SURGICAL HISTORY:  Lumbar radiculopathy  OA (osteoarthritis)  Diverticulitis  MI (myocardial infarction): 12/2014  Lumbar disc disorder  Benign hypertrophy of prostate  Thyroid disease  Depression  Stented coronary artery  CAD (coronary artery disease): cardiac stents x7  Diabetes: type 2  Hyperlipidemia  Hypertension  H/O heart artery stent  History of back surgery: lumbar  History of cardiac catheterization: 2010 2 stents, 2012 3 stents, 12/2014 2 coronary artery stents  S/P shoulder surgery: s/p R rotator cuff surgery  S/P knee replacement, bilateral      MEDICATIONS  (STANDING):  artificial  tears Solution 1 Drop(s) Both EYES daily  aspirin enteric coated 81 milliGRAM(s) Oral daily  dextrose 5%. 1000 milliLiter(s) (50 mL/Hr) IV Continuous <Continuous>  dextrose 5%. 1000 milliLiter(s) (50 mL/Hr) IV Continuous <Continuous>  dextrose 50% Injectable 12.5 Gram(s) IV Push once  dextrose 50% Injectable 25 Gram(s) IV Push once  dextrose 50% Injectable 25 Gram(s) IV Push once  dextrose 50% Injectable 12.5 Gram(s) IV Push once  dextrose 50% Injectable 25 Gram(s) IV Push once  dextrose 50% Injectable 25 Gram(s) IV Push once  docusate sodium 100 milliGRAM(s) Oral three times a day  enoxaparin Injectable 40 milliGRAM(s) SubCutaneous daily  famotidine    Tablet 20 milliGRAM(s) Oral two times a day  gabapentin 300 milliGRAM(s) Oral two times a day  insulin lispro (HumaLOG) corrective regimen sliding scale   SubCutaneous at bedtime  insulin lispro (HumaLOG) corrective regimen sliding scale   SubCutaneous three times a day before meals  levothyroxine 25 MICROGram(s) Oral daily  losartan 100 milliGRAM(s) Oral daily  methocarbamol 750 milliGRAM(s) Oral every 8 hours  methylnaltrexone Injectable 8 milliGRAM(s) SubCutaneous once  metoprolol tartrate 50 milliGRAM(s) Oral two times a day  senna 2 Tablet(s) Oral at bedtime  simvastatin 20 milliGRAM(s) Oral at bedtime  tamsulosin 0.4 milliGRAM(s) Oral daily      Allergies    No Known Allergies    Intolerances        SOCIAL HISTORY:  Denies ETOh,Smoking,     FAMILY HISTORY:  Family history of heart disease (Sibling)      REVIEW OF SYSTEMS:    CONSTITUTIONAL: No weakness, fevers or chills  EYES/ENT: No visual changes;  No vertigo or throat pain   NECK: No pain or stiffness  RESPIRATORY: No cough, wheezing, hemoptysis; No shortness of breath  CARDIOVASCULAR: No chest pain or palpitations  GENITOURINARY: No dysuria, frequency or hematuria  NEUROLOGICAL: No numbness or weakness  SKIN: No itching, burning, rashes, or lesions   All other review of systems is negative unless indicated above.    VITAL:  Vital Signs Last 24 Hrs  T(C): 36.7 (02 May 2018 15:53), Max: 37.4 (02 May 2018 00:46)  T(F): 98 (02 May 2018 15:53), Max: 99.3 (02 May 2018 00:46)  HR: 98 (02 May 2018 15:53) (72 - 98)  BP: 179/93 (02 May 2018 15:53) (126/70 - 184/80)  BP(mean): --  RR: 18 (02 May 2018 15:53) (18 - 18)  SpO2: 95% (02 May 2018 15:53) (93% - 96%)          PHYSICAL EXAM:    Constitutional: NAD  HEENT: PERRLA,   Neck: No JVD  Respiratory: CTA B/L  Cardiovascular: S1 and S2  Gastrointestinal: soft, moderately distended, not tender to palpation, no palpable hernia, NGT in place       LABS:                                   10.6   5.94  )-----------( 250      ( 02 May 2018 07:33 )             31.3         05-02    135  |  96  |  10  ----------------------------<  258<H>  3.6   |  29  |  1.01    Ca    8.8      02 May 2018 06:17    Mg: not obtained   P: not obtained       RADIOLOGY & ADDITIONAL STUDIES:   < from: CT Abdomen and Pelvis w/ Oral Cont (05.01.18 @ 20:34) >  COMPARISON: None.    PROCEDURE:   CT of the Abdomen and Pelvis was performed without intravenous contrast.   Intravenous contrast: None.  Oral contrast: positive contrast was administered.  Sagittal and coronal reformats were performed.    FINDINGS:    LOWER CHEST: Within normal limits.    LIVER: Within normal limits.  BILE DUCTS: Normal caliber.  GALLBLADDER: Within normal limits.  SPLEEN: Within normal limits.  PANCREAS: Within normal limits.  ADRENALS: Within normal limits.  KIDNEYS/URETERS: Cysts and small indeterminate right lower pole renal   lesions for example measuring up to 1.1 cm.    BLADDER: Under distended with a Cohen catheter.  REPRODUCTIVE ORGANS: Prostate and seminal vesicles normal.    BOWEL: Mild diffuse colonic distention with gas and stool. Rectal tube in   place. Scattered colonic diverticula. Appendix normal.  PERITONEUM: No ascites.  VESSELS:  Within normal limits.  RETROPERITONEUM: No lymphadenopathy.    ABDOMINAL WALL: Fat-containing inguinal hernias. Subcutaneous drain in   the back.  BONES: L3-5 fusion. Right iliac bone donor site.    IMPRESSION:Mild colonic distention with gas and stool compatible with   ileus.        < end of copied text >

## 2018-05-02 NOTE — PROGRESS NOTE ADULT - ASSESSMENT
72 year old male with PMH of HTN, CAD with stents x 7 last 2 stents from 2014, Dm2, BPH, GERD with complaints of lower back pain x many months worse x 2-3 months planned for Left L3 Decompressive hemilaminectomy, excision of synovial cyst. s/p Removal of spinal hardware.Exploration of spinal fusion.L3 decompressive laminectomy.Excision of left L3-L4 synovial cyst. L3-L5 fusion pod #2. course c/b abdominal distention/constipation    Plan:  Neuro stable/ HMV drains maintained   Vitals stable. 72 year old male with PMH of HTN, CAD with stents x 7 last 2 stents from 2014, Dm2, BPH, GERD with complaints of lower back pain x many months worse x 2-3 months planned for Left L3 Decompressive hemilaminectomy, excision of synovial cyst. s/p Removal of spinal hardware.Exploration of spinal fusion.L3 decompressive laminectomy.Excision of left L3-L4 synovial cyst. L3-L5 fusion pod #2. course c/b abdominal distention/constipation    Plan:  Neuro stable.  HMV drains maintained   Vitals stable. On Losartan and metoprolol  CAD with stents-on ASA 81mg   Abdominal distention/constipation-Golytely until BM. Removed rectal tube. GI f/u appreciated   PT- Home PT  DVt ppx 72 year old male with PMH of HTN, CAD with stents x 7 last 2 stents from 2014, Dm2, BPH, GERD with complaints of lower back pain x many months worse x 2-3 months planned for Left L3 Decompressive hemilaminectomy, excision of synovial cyst. s/p Removal of spinal hardware.Exploration of spinal fusion.L3 decompressive laminectomy.Excision of left L3-L4 synovial cyst. L3-L5 fusion pod #2. course c/b abdominal distention/constipation    Plan:  Neuro stable.  HMV drains maintained   Vitals stable. On Losartan and metoprolol  CAD with stents-on ASA 81mg . restarted Ranexa  Abdominal distention/constipation-Golytely until BM. Removed rectal tube. GI f/u appreciated   Type 2 DM- Lantus 10units tonight with sliding scale Humalog. Will add premeal insulin if better food intake  PT- Home PT  DVt ppx 72 year old male with PMH of HTN, CAD with stents x 7 last 2 stents from 2014, Dm2, BPH, GERD with complaints of lower back pain x many months worse x 2-3 months planned for Left L3 Decompressive hemilaminectomy, excision of synovial cyst. s/p Removal of spinal hardware.Exploration of spinal fusion.L3 decompressive laminectomy.Excision of left L3-L4 synovial cyst. L3-L5 fusion pod #2. course c/b abdominal distention/constipation    Plan:  Neuro stable.  HMV drains maintained   Vitals stable. On Losartan and metoprolol  CAD with stents-on ASA 81mg .  Ranexa held as per cards rec on 4/23 consult note  Abdominal distention/constipation-Golytely until BM. Removed rectal tube. GI f/u appreciated   Type 2 DM- Lantus 10units tonight with sliding scale Humalog. Will add premeal insulin if better food intake  PT- Home PT  DVt ppx

## 2018-05-02 NOTE — PROGRESS NOTE ADULT - SUBJECTIVE AND OBJECTIVE BOX
chief complaint : constipation, elevtaed bp        SUBJECTIVE / OVERNIGHT EVENTS:s/p left L3 Lumbar laminectomy, synovial cyst, and extension of fusion L3-5, pt says his pain is well controlled, c/o constipation, + flatus, episode of vomiting earlier    MEDICATIONS  (STANDING):  artificial  tears Solution 1 Drop(s) Both EYES daily  aspirin enteric coated 81 milliGRAM(s) Oral daily  dextrose 5%. 1000 milliLiter(s) (50 mL/Hr) IV Continuous <Continuous>  dextrose 5%. 1000 milliLiter(s) (50 mL/Hr) IV Continuous <Continuous>  dextrose 50% Injectable 12.5 Gram(s) IV Push once  dextrose 50% Injectable 25 Gram(s) IV Push once  dextrose 50% Injectable 25 Gram(s) IV Push once  dextrose 50% Injectable 12.5 Gram(s) IV Push once  dextrose 50% Injectable 25 Gram(s) IV Push once  dextrose 50% Injectable 25 Gram(s) IV Push once  docusate sodium 100 milliGRAM(s) Oral three times a day  enoxaparin Injectable 40 milliGRAM(s) SubCutaneous daily  famotidine    Tablet 20 milliGRAM(s) Oral two times a day  gabapentin 200 milliGRAM(s) Oral two times a day  insulin lispro (HumaLOG) corrective regimen sliding scale   SubCutaneous at bedtime  insulin lispro (HumaLOG) corrective regimen sliding scale   SubCutaneous three times a day before meals  labetalol Injectable 10 milliGRAM(s) IV Push once  levothyroxine 25 MICROGram(s) Oral daily  methocarbamol 750 milliGRAM(s) Oral every 8 hours  metoprolol tartrate Injectable 5 milliGRAM(s) IV Push every 6 hours  QUEtiapine 50 milliGRAM(s) Oral at bedtime  senna 2 Tablet(s) Oral at bedtime  simvastatin 20 milliGRAM(s) Oral at bedtime  sodium chloride 0.9% with potassium chloride 20 mEq/L 1000 milliLiter(s) (100 mL/Hr) IV Continuous <Continuous>  tamsulosin 0.4 milliGRAM(s) Oral daily    MEDICATIONS  (PRN):  bisacodyl Suppository 10 milliGRAM(s) Rectal daily PRN Constipation  dextrose Gel 1 Dose(s) Oral once PRN Blood Glucose LESS THAN 70 milliGRAM(s)/deciliter  dextrose Gel 1 Dose(s) Oral once PRN Blood Glucose LESS THAN 70 milliGRAM(s)/deciliter  glucagon  Injectable 1 milliGRAM(s) IntraMuscular once PRN Glucose LESS THAN 70 milligrams/deciliter  glucagon  Injectable 1 milliGRAM(s) IntraMuscular once PRN Glucose LESS THAN 70 milligrams/deciliter  HYDROmorphone  Injectable 0.5 milliGRAM(s) IV Push every 6 hours PRN Breakthrough pain  ondansetron Injectable 4 milliGRAM(s) IV Push every 6 hours PRN Nausea  simethicone 80 milliGRAM(s) Chew every 6 hours PRN Gas      Vital Signs Last 24 Hrs  T(C): 36.7 (02 May 2018 21:26), Max: 37.4 (02 May 2018 00:46)  T(F): 98.1 (02 May 2018 21:26), Max: 99.3 (02 May 2018 00:46)  HR: 108 (02 May 2018 21:38) (75 - 108)  BP: 190/100 (02 May 2018 21:38) (126/70 - 203/103)  BP(mean): --  RR: 18 (02 May 2018 21:26) (18 - 18)  SpO2: 96% (02 May 2018 21:26) (93% - 96%)    Constitutional: No fever, fatigue  Skin: No rash.  Eyes: No recent vision problems or eye pain.  ENT: No congestion, ear pain, or sore throat.  Cardiovascular: No chest pain or palpation.  Respiratory: No cough, shortness of breath, congestion, or wheezing.  Gastrointestinal: No abdominal pain, nausea, vomiting, or diarrhea.constiaption+  Genitourinary: No dysuria.  Musculoskeletal: No joint swelling.  Neurologic: No headache.    PHYSICAL EXAM:  GENERAL: NAD  EYES: EOMI, PERRLA  NECK: Supple, No JVD  CHEST/LUNG: dec breath sounds at bases   HEART:  S1 , S2 +  ABDOMEN: distension+, bs+, no reboud/ guarding   EXTREMITIES:  no edema  NEUROLOGY:alert awake oriented     LABS:  05-02    138  |  97  |  10  ----------------------------<  194<H>  3.3<L>   |  25  |  0.80    Ca    8.5      02 May 2018 17:35  Mg     1.4     05-02      Creatinine Trend: 0.80 <--, 1.01 <--, 0.87 <--, 0.86 <--                        10.6   5.94  )-----------( 250      ( 02 May 2018 07:33 )             31.3     Urine Studies:

## 2018-05-02 NOTE — CONSULT NOTE ADULT - ASSESSMENT
Assessment: This is a 72 year old male with no prior abdominal surgeries who underwent a lumbar spine surgery on 04/30/2018, has colonic ileus, with no evidence of Olgivie's syndrome.     - Avoid narcotics   - Replete electrolytes (please check Mg, P, K level)   - IVF/NPO  - Encourage ambulation twice a day instead of once a day    - Awaiting GI function, we do not recommend prokinetics  - Will continue to follow, discussed with Dr. Marco Antonio Leija     [id number]

## 2018-05-02 NOTE — PROGRESS NOTE ADULT - SUBJECTIVE AND OBJECTIVE BOX
JERRI SIDHU:[id number],   72yMale followed for:  No Known Allergies    PAST MEDICAL & SURGICAL HISTORY:  Lumbar radiculopathy  OA (osteoarthritis)  Diverticulitis  MI (myocardial infarction): 12/2014  Lumbar disc disorder  Benign hypertrophy of prostate  Thyroid disease  Depression  Stented coronary artery  CAD (coronary artery disease): cardiac stents x7  Diabetes: type 2  Hyperlipidemia  Hypertension  H/O heart artery stent  History of back surgery: lumbar  History of cardiac catheterization: 2010 2 stents, 2012 3 stents, 12/2014 2 coronary artery stents  S/P shoulder surgery: s/p R rotator cuff surgery  S/P knee replacement, bilateral    FAMILY HISTORY:  Family history of heart disease (Sibling)    MEDICATIONS  (STANDING):  artificial  tears Solution 1 Drop(s) Both EYES daily  aspirin enteric coated 81 milliGRAM(s) Oral daily  dextrose 5%. 1000 milliLiter(s) (50 mL/Hr) IV Continuous <Continuous>  dextrose 5%. 1000 milliLiter(s) (50 mL/Hr) IV Continuous <Continuous>  dextrose 50% Injectable 12.5 Gram(s) IV Push once  dextrose 50% Injectable 25 Gram(s) IV Push once  dextrose 50% Injectable 25 Gram(s) IV Push once  dextrose 50% Injectable 12.5 Gram(s) IV Push once  dextrose 50% Injectable 25 Gram(s) IV Push once  dextrose 50% Injectable 25 Gram(s) IV Push once  docusate sodium 100 milliGRAM(s) Oral three times a day  enoxaparin Injectable 40 milliGRAM(s) SubCutaneous daily  famotidine    Tablet 20 milliGRAM(s) Oral two times a day  gabapentin 300 milliGRAM(s) Oral two times a day  insulin lispro (HumaLOG) corrective regimen sliding scale   SubCutaneous at bedtime  insulin lispro (HumaLOG) corrective regimen sliding scale   SubCutaneous three times a day before meals  levothyroxine 25 MICROGram(s) Oral daily  losartan 100 milliGRAM(s) Oral daily  methocarbamol 750 milliGRAM(s) Oral every 8 hours  metoprolol tartrate 50 milliGRAM(s) Oral two times a day  senna 2 Tablet(s) Oral at bedtime  simvastatin 20 milliGRAM(s) Oral at bedtime  tamsulosin 0.4 milliGRAM(s) Oral daily    MEDICATIONS  (PRN):  acetaminophen   Tablet 650 milliGRAM(s) Oral every 6 hours PRN For Temp greater than 38 C (100.4 F)  acetaminophen   Tablet. 650 milliGRAM(s) Oral every 6 hours PRN Mild Pain (1 - 3)  bisacodyl Suppository 10 milliGRAM(s) Rectal daily PRN Constipation  dextrose Gel 1 Dose(s) Oral once PRN Blood Glucose LESS THAN 70 milliGRAM(s)/deciliter  dextrose Gel 1 Dose(s) Oral once PRN Blood Glucose LESS THAN 70 milliGRAM(s)/deciliter  glucagon  Injectable 1 milliGRAM(s) IntraMuscular once PRN Glucose LESS THAN 70 milligrams/deciliter  glucagon  Injectable 1 milliGRAM(s) IntraMuscular once PRN Glucose LESS THAN 70 milligrams/deciliter  HYDROmorphone  Injectable 0.5 milliGRAM(s) IV Push every 6 hours PRN Breakthrough pain  ondansetron Injectable 4 milliGRAM(s) IV Push every 6 hours PRN Nausea  oxyCODONE    IR 10 milliGRAM(s) Oral every 4 hours PRN Moderate Pain (4 - 6)  oxyCODONE    IR 15 milliGRAM(s) Oral every 4 hours PRN Severe Pain (7 - 10)  simethicone 80 milliGRAM(s) Chew every 6 hours PRN Gas      Vital Signs Last 24 Hrs  T(C): 36.9 (02 May 2018 04:56), Max: 37.4 (02 May 2018 00:46)  T(F): 98.4 (02 May 2018 04:56), Max: 99.3 (02 May 2018 00:46)  HR: 90 (02 May 2018 04:56) (72 - 95)  BP: 126/70 (02 May 2018 04:56) (126/70 - 210/84)  BP(mean): --  RR: 18 (02 May 2018 04:56) (18 - 18)  SpO2: 93% (02 May 2018 04:56) (93% - 96%)  nc/at  s1s2  cta  soft, nt, nd no guarding or rebound  no c/c/e    CBC Full  -  ( 01 May 2018 07:55 )  WBC Count : 6.91 K/uL  Hemoglobin : 10.6 g/dL  Hematocrit : 31.0 %  Platelet Count - Automated : 267 K/uL  Mean Cell Volume : 96.0 fl  Mean Cell Hemoglobin : 32.8 pg  Mean Cell Hemoglobin Concentration : 34.2 gm/dL  Auto Neutrophil # : 5.69 K/uL  Auto Lymphocyte # : 0.58 K/uL  Auto Monocyte # : 0.62 K/uL  Auto Eosinophil # : 0.00 K/uL  Auto Basophil # : 0.00 K/uL  Auto Neutrophil % : 82.3 %  Auto Lymphocyte % : 8.4 %  Auto Monocyte % : 9.0 %  Auto Eosinophil % : 0.0 %  Auto Basophil % : 0.0 %    05-01    136  |  97  |  7   ----------------------------<  197<H>  4.0   |  26  |  0.87    Ca    8.8      01 May 2018 06:09

## 2018-05-02 NOTE — PROGRESS NOTE ADULT - ASSESSMENT
72 year old male with PMH of HTN, CAD with stents x 7 last 2 stents from 2014, Dm2, BPH, GERD with complaints of lower back pain x many months worse x 2-3 months planned for Left L3 Decompressive hemilaminectomy, excision of synovial cyst.       - pain control , PT     Constipation - aggressive bowel regimen , + flatus , as per pts family -pt suffers with constipation all the time,   < from: CT Abdomen and Pelvis w/ Oral Cont (05.01.18 @ 20:34) >  Mild colonic distention with gas and stool compatible with   ileus.    < end of copied text >    evaluated by GI - reglan , golytley recommended , frequent abd exam      Uncontrolled htn - cont losartan+ metoprolol    DM2- iss    discussed management plan with pt

## 2018-05-03 LAB
ANION GAP SERPL CALC-SCNC: 17 MMOL/L — SIGNIFICANT CHANGE UP (ref 5–17)
BASOPHILS # BLD AUTO: 0.01 K/UL — SIGNIFICANT CHANGE UP (ref 0–0.2)
BASOPHILS NFR BLD AUTO: 0.1 % — SIGNIFICANT CHANGE UP (ref 0–2)
BUN SERPL-MCNC: 7 MG/DL — SIGNIFICANT CHANGE UP (ref 7–23)
CALCIUM SERPL-MCNC: 8.3 MG/DL — LOW (ref 8.4–10.5)
CHLORIDE SERPL-SCNC: 96 MMOL/L — SIGNIFICANT CHANGE UP (ref 96–108)
CO2 SERPL-SCNC: 22 MMOL/L — SIGNIFICANT CHANGE UP (ref 22–31)
CREAT SERPL-MCNC: 0.73 MG/DL — SIGNIFICANT CHANGE UP (ref 0.5–1.3)
EOSINOPHIL # BLD AUTO: 0.01 K/UL — SIGNIFICANT CHANGE UP (ref 0–0.5)
EOSINOPHIL NFR BLD AUTO: 0.1 % — SIGNIFICANT CHANGE UP (ref 0–6)
GLUCOSE BLDC GLUCOMTR-MCNC: 180 MG/DL — HIGH (ref 70–99)
GLUCOSE BLDC GLUCOMTR-MCNC: 210 MG/DL — HIGH (ref 70–99)
GLUCOSE BLDC GLUCOMTR-MCNC: 214 MG/DL — HIGH (ref 70–99)
GLUCOSE BLDC GLUCOMTR-MCNC: 234 MG/DL — HIGH (ref 70–99)
GLUCOSE SERPL-MCNC: 196 MG/DL — HIGH (ref 70–99)
HCT VFR BLD CALC: 34.6 % — LOW (ref 39–50)
HCT VFR BLD CALC: 34.8 % — LOW (ref 39–50)
HGB BLD-MCNC: 11.8 G/DL — LOW (ref 13–17)
HGB BLD-MCNC: 12 G/DL — LOW (ref 13–17)
IMM GRANULOCYTES NFR BLD AUTO: 0.1 % — SIGNIFICANT CHANGE UP (ref 0–1.5)
LYMPHOCYTES # BLD AUTO: 1.05 K/UL — SIGNIFICANT CHANGE UP (ref 1–3.3)
LYMPHOCYTES # BLD AUTO: 12.3 % — LOW (ref 13–44)
MCHC RBC-ENTMCNC: 32.3 PG — SIGNIFICANT CHANGE UP (ref 27–34)
MCHC RBC-ENTMCNC: 32.5 PG — SIGNIFICANT CHANGE UP (ref 27–34)
MCHC RBC-ENTMCNC: 34.1 GM/DL — SIGNIFICANT CHANGE UP (ref 32–36)
MCHC RBC-ENTMCNC: 34.5 GM/DL — SIGNIFICANT CHANGE UP (ref 32–36)
MCV RBC AUTO: 94.3 FL — SIGNIFICANT CHANGE UP (ref 80–100)
MCV RBC AUTO: 94.8 FL — SIGNIFICANT CHANGE UP (ref 80–100)
MONOCYTES # BLD AUTO: 1.13 K/UL — HIGH (ref 0–0.9)
MONOCYTES NFR BLD AUTO: 13.2 % — SIGNIFICANT CHANGE UP (ref 2–14)
NEUTROPHILS # BLD AUTO: 6.35 K/UL — SIGNIFICANT CHANGE UP (ref 1.8–7.4)
NEUTROPHILS NFR BLD AUTO: 74.2 % — SIGNIFICANT CHANGE UP (ref 43–77)
PLATELET # BLD AUTO: 278 K/UL — SIGNIFICANT CHANGE UP (ref 150–400)
PLATELET # BLD AUTO: 284 K/UL — SIGNIFICANT CHANGE UP (ref 150–400)
POTASSIUM SERPL-MCNC: 3.8 MMOL/L — SIGNIFICANT CHANGE UP (ref 3.5–5.3)
POTASSIUM SERPL-SCNC: 3.8 MMOL/L — SIGNIFICANT CHANGE UP (ref 3.5–5.3)
RBC # BLD: 3.65 M/UL — LOW (ref 4.2–5.8)
RBC # BLD: 3.69 M/UL — LOW (ref 4.2–5.8)
RBC # FLD: 13.5 % — SIGNIFICANT CHANGE UP (ref 10.3–14.5)
RBC # FLD: 13.6 % — SIGNIFICANT CHANGE UP (ref 10.3–14.5)
SODIUM SERPL-SCNC: 135 MMOL/L — SIGNIFICANT CHANGE UP (ref 135–145)
WBC # BLD: 8.56 K/UL — SIGNIFICANT CHANGE UP (ref 3.8–10.5)
WBC # BLD: 9.33 K/UL — SIGNIFICANT CHANGE UP (ref 3.8–10.5)
WBC # FLD AUTO: 8.56 K/UL — SIGNIFICANT CHANGE UP (ref 3.8–10.5)
WBC # FLD AUTO: 9.33 K/UL — SIGNIFICANT CHANGE UP (ref 3.8–10.5)

## 2018-05-03 PROCEDURE — 74019 RADEX ABDOMEN 2 VIEWS: CPT | Mod: 26

## 2018-05-03 RX ORDER — METOCLOPRAMIDE HCL 10 MG
10 TABLET ORAL EVERY 8 HOURS
Qty: 0 | Refills: 0 | Status: COMPLETED | OUTPATIENT
Start: 2018-05-03 | End: 2018-05-04

## 2018-05-03 RX ORDER — METOPROLOL TARTRATE 50 MG
5 TABLET ORAL ONCE
Qty: 0 | Refills: 0 | Status: DISCONTINUED | OUTPATIENT
Start: 2018-05-03 | End: 2018-05-04

## 2018-05-03 RX ORDER — POTASSIUM CHLORIDE 20 MEQ
10 PACKET (EA) ORAL
Qty: 0 | Refills: 0 | Status: COMPLETED | OUTPATIENT
Start: 2018-05-03 | End: 2018-05-03

## 2018-05-03 RX ORDER — DEXTROSE MONOHYDRATE, SODIUM CHLORIDE, AND POTASSIUM CHLORIDE 50; .745; 4.5 G/1000ML; G/1000ML; G/1000ML
1000 INJECTION, SOLUTION INTRAVENOUS
Qty: 0 | Refills: 0 | Status: DISCONTINUED | OUTPATIENT
Start: 2018-05-03 | End: 2018-05-04

## 2018-05-03 RX ORDER — HYDRALAZINE HCL 50 MG
10 TABLET ORAL ONCE
Qty: 0 | Refills: 0 | Status: COMPLETED | OUTPATIENT
Start: 2018-05-03 | End: 2018-05-03

## 2018-05-03 RX ORDER — LOSARTAN POTASSIUM 100 MG/1
100 TABLET, FILM COATED ORAL DAILY
Qty: 0 | Refills: 0 | Status: DISCONTINUED | OUTPATIENT
Start: 2018-05-03 | End: 2018-05-04

## 2018-05-03 RX ORDER — MAGNESIUM SULFATE 500 MG/ML
2 VIAL (ML) INJECTION ONCE
Qty: 0 | Refills: 0 | Status: COMPLETED | OUTPATIENT
Start: 2018-05-03 | End: 2018-05-03

## 2018-05-03 RX ORDER — LABETALOL HCL 100 MG
10 TABLET ORAL ONCE
Qty: 0 | Refills: 0 | Status: DISCONTINUED | OUTPATIENT
Start: 2018-05-03 | End: 2018-05-04

## 2018-05-03 RX ORDER — SODIUM CHLORIDE 9 MG/ML
250 INJECTION INTRAMUSCULAR; INTRAVENOUS; SUBCUTANEOUS ONCE
Qty: 0 | Refills: 0 | Status: COMPLETED | OUTPATIENT
Start: 2018-05-03 | End: 2018-05-03

## 2018-05-03 RX ADMIN — METHOCARBAMOL 750 MILLIGRAM(S): 500 TABLET, FILM COATED ORAL at 22:56

## 2018-05-03 RX ADMIN — Medication 4: at 17:46

## 2018-05-03 RX ADMIN — Medication 100 MILLIEQUIVALENT(S): at 04:49

## 2018-05-03 RX ADMIN — LOSARTAN POTASSIUM 100 MILLIGRAM(S): 100 TABLET, FILM COATED ORAL at 14:49

## 2018-05-03 RX ADMIN — GABAPENTIN 200 MILLIGRAM(S): 400 CAPSULE ORAL at 17:03

## 2018-05-03 RX ADMIN — TAMSULOSIN HYDROCHLORIDE 0.4 MILLIGRAM(S): 0.4 CAPSULE ORAL at 12:09

## 2018-05-03 RX ADMIN — Medication 10 MILLIGRAM(S): at 17:03

## 2018-05-03 RX ADMIN — Medication 5 MILLIGRAM(S): at 17:03

## 2018-05-03 RX ADMIN — Medication 81 MILLIGRAM(S): at 12:09

## 2018-05-03 RX ADMIN — HYDROMORPHONE HYDROCHLORIDE 0.5 MILLIGRAM(S): 2 INJECTION INTRAMUSCULAR; INTRAVENOUS; SUBCUTANEOUS at 03:00

## 2018-05-03 RX ADMIN — Medication 10 MILLIGRAM(S): at 14:18

## 2018-05-03 RX ADMIN — Medication 100 MILLIEQUIVALENT(S): at 02:26

## 2018-05-03 RX ADMIN — Medication 5 MILLIGRAM(S): at 06:35

## 2018-05-03 RX ADMIN — Medication 4: at 12:44

## 2018-05-03 RX ADMIN — Medication 100 MILLIEQUIVALENT(S): at 03:32

## 2018-05-03 RX ADMIN — Medication 5 MILLIGRAM(S): at 00:33

## 2018-05-03 RX ADMIN — SIMVASTATIN 20 MILLIGRAM(S): 20 TABLET, FILM COATED ORAL at 22:59

## 2018-05-03 RX ADMIN — Medication 50 GRAM(S): at 06:37

## 2018-05-03 RX ADMIN — METHOCARBAMOL 750 MILLIGRAM(S): 500 TABLET, FILM COATED ORAL at 14:18

## 2018-05-03 RX ADMIN — QUETIAPINE FUMARATE 50 MILLIGRAM(S): 200 TABLET, FILM COATED ORAL at 22:55

## 2018-05-03 RX ADMIN — ENOXAPARIN SODIUM 40 MILLIGRAM(S): 100 INJECTION SUBCUTANEOUS at 12:09

## 2018-05-03 RX ADMIN — HYDROMORPHONE HYDROCHLORIDE 0.5 MILLIGRAM(S): 2 INJECTION INTRAMUSCULAR; INTRAVENOUS; SUBCUTANEOUS at 02:35

## 2018-05-03 RX ADMIN — Medication 1 DROP(S): at 12:09

## 2018-05-03 RX ADMIN — Medication 5 MILLIGRAM(S): at 12:09

## 2018-05-03 RX ADMIN — FAMOTIDINE 20 MILLIGRAM(S): 10 INJECTION INTRAVENOUS at 17:03

## 2018-05-03 RX ADMIN — SODIUM CHLORIDE 500 MILLILITER(S): 9 INJECTION INTRAMUSCULAR; INTRAVENOUS; SUBCUTANEOUS at 02:26

## 2018-05-03 NOTE — PROGRESS NOTE ADULT - ASSESSMENT
PROCEDURE: Adm 4/30. Lt L3 hudson lami, Rsxn synovial cyst. Ext fusion L3-5    POD#3    PLAN:  Neuro: Rt HMV DC'd this AM. Cont Lt HMV.  Decrease IVF to 50cc/hr due to hypertension.  Cont Cohen until BM improves and pt more active.  Hypoklalemia resolved. CBC-P FU. Inc activity/OOB. Start clears now and adv as tolerated.     Surgery-- Avoid narcotics, Replete electrolytes (please check Mg, P, K level), IVF/NPO, Encourage ambulation twice a day instead of once a day,  Awaiting GI function, we do not recommend prokinetics, Will continue to follow, discussed with Dr. Marco Antonio Erwin/Dr Burr- Constipation - aggressive bowel regimen, as per pts family-pt suffers with constipation all the time,   evaluated by GI - reglan , golytley recommended , frequent abd exam, Uncontrolled htn - cont losartan+ metoprolol, DM2- iss    GI/Dr Richards-pt moving bowels, ct unimpressive, continue current rx.  agree with advance diet.  d/w team    Respiratory: Patient instructed to use incentive spirometer [X ] YES [ ] NO                DVT ppx: [X ] SQL [ ] SQH and Venodynes [ ] Left [ ] Right [X ] Bilateral    Discharge planning: PT-Home PT, but requesting [facility].

## 2018-05-03 NOTE — PROGRESS NOTE ADULT - SUBJECTIVE AND OBJECTIVE BOX
chief complaint : constipation, elevtaed bp        SUBJECTIVE / OVERNIGHT EVENTS:s/p left L3 Lumbar laminectomy, synovial cyst, and extension of fusion L3-5, pt says his pain is well controlled,  pt had multiple bms so far, denies abd pain     MEDICATIONS  (STANDING):  artificial  tears Solution 1 Drop(s) Both EYES daily  aspirin enteric coated 81 milliGRAM(s) Oral daily  dextrose 5%. 1000 milliLiter(s) (50 mL/Hr) IV Continuous <Continuous>  dextrose 5%. 1000 milliLiter(s) (50 mL/Hr) IV Continuous <Continuous>  dextrose 50% Injectable 12.5 Gram(s) IV Push once  dextrose 50% Injectable 25 Gram(s) IV Push once  dextrose 50% Injectable 25 Gram(s) IV Push once  dextrose 50% Injectable 12.5 Gram(s) IV Push once  dextrose 50% Injectable 25 Gram(s) IV Push once  dextrose 50% Injectable 25 Gram(s) IV Push once  docusate sodium 100 milliGRAM(s) Oral three times a day  enoxaparin Injectable 40 milliGRAM(s) SubCutaneous daily  famotidine    Tablet 20 milliGRAM(s) Oral two times a day  gabapentin 200 milliGRAM(s) Oral two times a day  insulin lispro (HumaLOG) corrective regimen sliding scale   SubCutaneous at bedtime  insulin lispro (HumaLOG) corrective regimen sliding scale   SubCutaneous three times a day before meals  labetalol Injectable 10 milliGRAM(s) IV Push once  levothyroxine 25 MICROGram(s) Oral daily  losartan 100 milliGRAM(s) Oral daily  methocarbamol 750 milliGRAM(s) Oral every 8 hours  metoprolol tartrate Injectable 5 milliGRAM(s) IV Push once  metoprolol tartrate Injectable 5 milliGRAM(s) IV Push every 6 hours  QUEtiapine 50 milliGRAM(s) Oral at bedtime  senna 2 Tablet(s) Oral at bedtime  simvastatin 20 milliGRAM(s) Oral at bedtime  sodium chloride 0.9% with potassium chloride 20 mEq/L 1000 milliLiter(s) (50 mL/Hr) IV Continuous <Continuous>  tamsulosin 0.4 milliGRAM(s) Oral daily    MEDICATIONS  (PRN):  bisacodyl Suppository 10 milliGRAM(s) Rectal daily PRN Constipation  dextrose Gel 1 Dose(s) Oral once PRN Blood Glucose LESS THAN 70 milliGRAM(s)/deciliter  dextrose Gel 1 Dose(s) Oral once PRN Blood Glucose LESS THAN 70 milliGRAM(s)/deciliter  glucagon  Injectable 1 milliGRAM(s) IntraMuscular once PRN Glucose LESS THAN 70 milligrams/deciliter  glucagon  Injectable 1 milliGRAM(s) IntraMuscular once PRN Glucose LESS THAN 70 milligrams/deciliter  HYDROmorphone  Injectable 0.5 milliGRAM(s) IV Push every 6 hours PRN Breakthrough pain  ondansetron Injectable 4 milliGRAM(s) IV Push every 6 hours PRN Nausea  simethicone 80 milliGRAM(s) Chew every 6 hours PRN Gas      Vital Signs Last 24 Hrs  T(C): 36.6 (03 May 2018 22:40), Max: 37.4 (03 May 2018 21:38)  T(F): 97.8 (03 May 2018 22:40), Max: 99.4 (03 May 2018 21:38)  HR: 110 (03 May 2018 22:40) (110 - 120)  BP: 167/87 (03 May 2018 22:40) (165/93 - 185/102)  BP(mean): --  RR: 18 (03 May 2018 22:40) (18 - 18)  SpO2: 97% (03 May 2018 22:40) (94% - 97%)    Constitutional: No fever, fatigue  Skin: No rash.  Eyes: No recent vision problems or eye pain.  ENT: No congestion, ear pain, or sore throat.  Cardiovascular: No chest pain or palpation.  Respiratory: No cough, shortness of breath, congestion, or wheezing.  Gastrointestinal: No abdominal pain, nausea, vomiting, or diarrhea.  Genitourinary: No dysuria.  Musculoskeletal: No joint swelling.  Neurologic: No headache.    PHYSICAL EXAM:  GENERAL: NAD  EYES: EOMI, PERRLA  NECK: Supple, No JVD  CHEST/LUNG: dec breath sounds at bases   HEART:  S1 , S2 +  ABDOMEN: dec abd  distension, bs+, no reboud/ guarding   EXTREMITIES:  no edema  NEUROLOGY:alert awake oriented     LABS:  05-03    135  |  96  |  7   ----------------------------<  196<H>  3.8   |  22  |  0.73    Ca    8.3<L>      03 May 2018 07:29  Mg     1.4     05-02      Creatinine Trend: 0.73 <--, 0.80 <--, 1.01 <--, 0.87 <--, 0.86 <--                        11.8   9.33  )-----------( 284      ( 03 May 2018 09:43 )             34.6     Urine Studies:

## 2018-05-03 NOTE — PROGRESS NOTE ADULT - SUBJECTIVE AND OBJECTIVE BOX
SUBJECTIVE: Comfortable.  State feels weak. Mult firm BM with loose diarrhea intermittently.  No N/V. Wants to eat. Pt pulled NGT overnite.    OVERNIGHT EVENTS: Diarrhea in between firm BM's    Vital Signs Last 24 Hrs  T(C): 36.6 (03 May 2018 05:45), Max: 36.9 (03 May 2018 00:10)  T(F): 97.9 (03 May 2018 05:45), Max: 98.4 (03 May 2018 00:10)  HR: 120 (03 May 2018 06:35) (83 - 120)  BP: 185/102 (03 May 2018 05:45) (140/82 - 203/103)  BP(mean): --  RR: 18 (03 May 2018 05:45) (18 - 18)  SpO2: 95% (03 May 2018 05:45) (95% - 96%)  IVF: [ ] IVL [X ] NS+K@ 100  DIET: [X ] NPO [ ] CCD [ ] Renal [ ] Puree [ ] Dysphagia [ ] Tube Feeds:   PCA: [ ] YES [ X] NO   PÉREZ: [X ] YES-replaced 5/2  BM: [X ] YES- mult firm BM with intermittent diarrhea.    DRAINS: [ ] SUAD (cc/24h) [X ] HMV lt/Rt 75/0 (cc/24h)    PHYSICAL EXAM:    Constitutional: No Acute Distress     Neurological: AOx3, Following Commands, Moving all Extremities     Motor exam:          Upper extremity                         Delt     Bicep     Tricep    HG                                                 R         5/5        5/5        5/5       5/5                                               L          5/5        5/5        5/5       5/5          Lower extremity                        HF         KF        KE       DF         PF                                                  R        5/5        5/5        5/5       5/5         5/5                                               L         5/5        5/5       5/5       5/5          5/5                                                 Sensation: [X] intact to light touch  [] decreased:     Pulmonary: Clear to Auscultation, No rales, No rhonchi, No wheezes     Cardiovascular: S1, S2, Regular rate and rhythm     Gastrointestinal: Soft, Non-tender, Non-distended     Extremities: No calf tenderness     Incision: HMV x2. +sutures. CDI. Flat.    LABS:                        10.6   5.94  )-----------( 250      ( 02 May 2018 07:33 )             31.3    05-03    135  |  96  |  7   ----------------------------<  196<H>  3.8   |  22  |  0.73    Ca    8.3<L>      03 May 2018 07:29  Mg     1.4     05-02      IMAGING: < from: CT Abdomen and Pelvis w/ Oral Cont (05.01.18 @ 20:34) >  IMPRESSION:Mild colonic distention with gas and stool compatible with   ileus    MEDICATIONS  (STANDING):  artificial  tears Solution 1 Drop(s) Both EYES daily  aspirin enteric coated 81 milliGRAM(s) Oral daily  dextrose 5%. 1000 milliLiter(s) (50 mL/Hr) IV Continuous <Continuous>  dextrose 5%. 1000 milliLiter(s) (50 mL/Hr) IV Continuous <Continuous>  dextrose 50% Injectable 12.5 Gram(s) IV Push once  dextrose 50% Injectable 25 Gram(s) IV Push once  dextrose 50% Injectable 25 Gram(s) IV Push once  dextrose 50% Injectable 12.5 Gram(s) IV Push once  dextrose 50% Injectable 25 Gram(s) IV Push once  dextrose 50% Injectable 25 Gram(s) IV Push once  docusate sodium 100 milliGRAM(s) Oral three times a day  enoxaparin Injectable 40 milliGRAM(s) SubCutaneous daily  famotidine    Tablet 20 milliGRAM(s) Oral two times a day  gabapentin 200 milliGRAM(s) Oral two times a day  insulin lispro (HumaLOG) corrective regimen sliding scale   SubCutaneous at bedtime  insulin lispro (HumaLOG) corrective regimen sliding scale   SubCutaneous three times a day before meals  levothyroxine 25 MICROGram(s) Oral daily  methocarbamol 750 milliGRAM(s) Oral every 8 hours  metoprolol tartrate Injectable 5 milliGRAM(s) IV Push every 6 hours  QUEtiapine 50 milliGRAM(s) Oral at bedtime  senna 2 Tablet(s) Oral at bedtime  simvastatin 20 milliGRAM(s) Oral at bedtime  sodium chloride 0.9% with potassium chloride 20 mEq/L 1000 milliLiter(s) (100 mL/Hr) IV Continuous <Continuous>  tamsulosin 0.4 milliGRAM(s) Oral daily    MEDICATIONS  (PRN):  bisacodyl Suppository 10 milliGRAM(s) Rectal daily PRN Constipation  dextrose Gel 1 Dose(s) Oral once PRN Blood Glucose LESS THAN 70 milliGRAM(s)/deciliter  dextrose Gel 1 Dose(s) Oral once PRN Blood Glucose LESS THAN 70 milliGRAM(s)/deciliter  glucagon  Injectable 1 milliGRAM(s) IntraMuscular once PRN Glucose LESS THAN 70 milligrams/deciliter  glucagon  Injectable 1 milliGRAM(s) IntraMuscular once PRN Glucose LESS THAN 70 milligrams/deciliter  HYDROmorphone  Injectable 0.5 milliGRAM(s) IV Push every 6 hours PRN Breakthrough pain  ondansetron Injectable 4 milliGRAM(s) IV Push every 6 hours PRN Nausea  simethicone 80 milliGRAM(s) Chew every 6 hours PRN Gas

## 2018-05-03 NOTE — PROGRESS NOTE ADULT - SUBJECTIVE AND OBJECTIVE BOX
JERRI SIDHU:[id number],   72yMale followed for:  No Known Allergies    PAST MEDICAL & SURGICAL HISTORY:  Lumbar radiculopathy  OA (osteoarthritis)  Diverticulitis  MI (myocardial infarction): 12/2014  Lumbar disc disorder  Benign hypertrophy of prostate  Thyroid disease  Depression  Stented coronary artery  CAD (coronary artery disease): cardiac stents x7  Diabetes: type 2  Hyperlipidemia  Hypertension  H/O heart artery stent  History of back surgery: lumbar  History of cardiac catheterization: 2010 2 stents, 2012 3 stents, 12/2014 2 coronary artery stents  S/P shoulder surgery: s/p R rotator cuff surgery  S/P knee replacement, bilateral    FAMILY HISTORY:  Family history of heart disease (Sibling)    MEDICATIONS  (STANDING):  artificial  tears Solution 1 Drop(s) Both EYES daily  aspirin enteric coated 81 milliGRAM(s) Oral daily  dextrose 5%. 1000 milliLiter(s) (50 mL/Hr) IV Continuous <Continuous>  dextrose 5%. 1000 milliLiter(s) (50 mL/Hr) IV Continuous <Continuous>  dextrose 50% Injectable 12.5 Gram(s) IV Push once  dextrose 50% Injectable 25 Gram(s) IV Push once  dextrose 50% Injectable 25 Gram(s) IV Push once  dextrose 50% Injectable 12.5 Gram(s) IV Push once  dextrose 50% Injectable 25 Gram(s) IV Push once  dextrose 50% Injectable 25 Gram(s) IV Push once  docusate sodium 100 milliGRAM(s) Oral three times a day  enoxaparin Injectable 40 milliGRAM(s) SubCutaneous daily  famotidine    Tablet 20 milliGRAM(s) Oral two times a day  gabapentin 200 milliGRAM(s) Oral two times a day  insulin lispro (HumaLOG) corrective regimen sliding scale   SubCutaneous at bedtime  insulin lispro (HumaLOG) corrective regimen sliding scale   SubCutaneous three times a day before meals  levothyroxine 25 MICROGram(s) Oral daily  methocarbamol 750 milliGRAM(s) Oral every 8 hours  metoprolol tartrate Injectable 5 milliGRAM(s) IV Push every 6 hours  QUEtiapine 50 milliGRAM(s) Oral at bedtime  senna 2 Tablet(s) Oral at bedtime  simvastatin 20 milliGRAM(s) Oral at bedtime  sodium chloride 0.9% with potassium chloride 20 mEq/L 1000 milliLiter(s) (50 mL/Hr) IV Continuous <Continuous>  tamsulosin 0.4 milliGRAM(s) Oral daily    MEDICATIONS  (PRN):  bisacodyl Suppository 10 milliGRAM(s) Rectal daily PRN Constipation  dextrose Gel 1 Dose(s) Oral once PRN Blood Glucose LESS THAN 70 milliGRAM(s)/deciliter  dextrose Gel 1 Dose(s) Oral once PRN Blood Glucose LESS THAN 70 milliGRAM(s)/deciliter  glucagon  Injectable 1 milliGRAM(s) IntraMuscular once PRN Glucose LESS THAN 70 milligrams/deciliter  glucagon  Injectable 1 milliGRAM(s) IntraMuscular once PRN Glucose LESS THAN 70 milligrams/deciliter  HYDROmorphone  Injectable 0.5 milliGRAM(s) IV Push every 6 hours PRN Breakthrough pain  ondansetron Injectable 4 milliGRAM(s) IV Push every 6 hours PRN Nausea  simethicone 80 milliGRAM(s) Chew every 6 hours PRN Gas      Vital Signs Last 24 Hrs  T(C): 36.6 (03 May 2018 05:45), Max: 36.9 (03 May 2018 00:10)  T(F): 97.9 (03 May 2018 05:45), Max: 98.4 (03 May 2018 00:10)  HR: 120 (03 May 2018 06:35) (83 - 120)  BP: 185/102 (03 May 2018 05:45) (140/82 - 203/103)  BP(mean): --  RR: 18 (03 May 2018 05:45) (18 - 18)  SpO2: 95% (03 May 2018 05:45) (95% - 96%)  nc/at  s1s2  cta  soft, nt, nd no guarding or rebound  no c/c/e    CBC Full  -  ( 02 May 2018 07:33 )  WBC Count : 5.94 K/uL  Hemoglobin : 10.6 g/dL  Hematocrit : 31.3 %  Platelet Count - Automated : 250 K/uL  Mean Cell Volume : 96.6 fl  Mean Cell Hemoglobin : 32.7 pg  Mean Cell Hemoglobin Concentration : 33.9 gm/dL  Auto Neutrophil # : 4.63 K/uL  Auto Lymphocyte # : 0.77 K/uL  Auto Monocyte # : 0.53 K/uL  Auto Eosinophil # : 0.00 K/uL  Auto Basophil # : 0.00 K/uL  Auto Neutrophil % : 77.0 %  Auto Lymphocyte % : 13.0 %  Auto Monocyte % : 9.0 %  Auto Eosinophil % : 0.0 %  Auto Basophil % : 0.0 %    05-03    135  |  96  |  7   ----------------------------<  196<H>  3.8   |  22  |  0.73    Ca    8.3<L>      03 May 2018 07:29  Mg     1.4     05-02

## 2018-05-03 NOTE — PROGRESS NOTE ADULT - ASSESSMENT
72 year old male with PMH of HTN, CAD with stents x 7 last 2 stents from 2014, Dm2, BPH, GERD with complaints of lower back pain x many months worse x 2-3 months planned for Left L3 Decompressive hemilaminectomy, excision of synovial cyst.       - pain control , PT     Constipation - ct abd with ileus, GI evaluated pt , s/p Golytely - pt had bms ,     \Uncontrolled htn - cont losartan+ metoprolol    DM2- iss    discussed management plan with pt

## 2018-05-04 VITALS
TEMPERATURE: 98 F | OXYGEN SATURATION: 98 % | HEART RATE: 110 BPM | DIASTOLIC BLOOD PRESSURE: 93 MMHG | RESPIRATION RATE: 18 BRPM | SYSTOLIC BLOOD PRESSURE: 153 MMHG

## 2018-05-04 DIAGNOSIS — M54.16 RADICULOPATHY, LUMBAR REGION: ICD-10-CM

## 2018-05-04 LAB
BUN SERPL-MCNC: 10 MG/DL — SIGNIFICANT CHANGE UP (ref 7–23)
CALCIUM SERPL-MCNC: 8.2 MG/DL — LOW (ref 8.4–10.5)
CHLORIDE SERPL-SCNC: 95 MMOL/L — LOW (ref 96–108)
CO2 SERPL-SCNC: 21 MMOL/L — LOW (ref 22–31)
CREAT SERPL-MCNC: 0.79 MG/DL — SIGNIFICANT CHANGE UP (ref 0.5–1.3)
GLUCOSE BLDC GLUCOMTR-MCNC: 210 MG/DL — HIGH (ref 70–99)
GLUCOSE BLDC GLUCOMTR-MCNC: 247 MG/DL — HIGH (ref 70–99)
GLUCOSE BLDC GLUCOMTR-MCNC: 382 MG/DL — HIGH (ref 70–99)
GLUCOSE SERPL-MCNC: 190 MG/DL — HIGH (ref 70–99)
HCT VFR BLD CALC: 31.7 % — LOW (ref 39–50)
HGB BLD-MCNC: 10.8 G/DL — LOW (ref 13–17)
MAGNESIUM SERPL-MCNC: 1.7 MG/DL — SIGNIFICANT CHANGE UP (ref 1.6–2.6)
MCHC RBC-ENTMCNC: 33.6 PG — SIGNIFICANT CHANGE UP (ref 27–34)
MCHC RBC-ENTMCNC: 34.2 GM/DL — SIGNIFICANT CHANGE UP (ref 32–36)
MCV RBC AUTO: 98.4 FL — SIGNIFICANT CHANGE UP (ref 80–100)
PHOSPHATE SERPL-MCNC: 1.9 MG/DL — LOW (ref 2.5–4.5)
PLATELET # BLD AUTO: 282 K/UL — SIGNIFICANT CHANGE UP (ref 150–400)
POTASSIUM SERPL-MCNC: 3.3 MMOL/L — LOW (ref 3.5–5.3)
POTASSIUM SERPL-SCNC: 3.3 MMOL/L — LOW (ref 3.5–5.3)
RBC # BLD: 3.22 M/UL — LOW (ref 4.2–5.8)
RBC # FLD: 12.4 % — SIGNIFICANT CHANGE UP (ref 10.3–14.5)
SODIUM SERPL-SCNC: 131 MMOL/L — LOW (ref 135–145)
WBC # BLD: 8.1 K/UL — SIGNIFICANT CHANGE UP (ref 3.8–10.5)
WBC # FLD AUTO: 8.1 K/UL — SIGNIFICANT CHANGE UP (ref 3.8–10.5)

## 2018-05-04 PROCEDURE — C1889: CPT

## 2018-05-04 PROCEDURE — 82803 BLOOD GASES ANY COMBINATION: CPT

## 2018-05-04 PROCEDURE — 80048 BASIC METABOLIC PNL TOTAL CA: CPT

## 2018-05-04 PROCEDURE — 84295 ASSAY OF SERUM SODIUM: CPT

## 2018-05-04 PROCEDURE — 82947 ASSAY GLUCOSE BLOOD QUANT: CPT

## 2018-05-04 PROCEDURE — 76000 FLUOROSCOPY <1 HR PHYS/QHP: CPT

## 2018-05-04 PROCEDURE — 82330 ASSAY OF CALCIUM: CPT

## 2018-05-04 PROCEDURE — 97116 GAIT TRAINING THERAPY: CPT

## 2018-05-04 PROCEDURE — 84100 ASSAY OF PHOSPHORUS: CPT

## 2018-05-04 PROCEDURE — 82435 ASSAY OF BLOOD CHLORIDE: CPT

## 2018-05-04 PROCEDURE — C1713: CPT

## 2018-05-04 PROCEDURE — 85014 HEMATOCRIT: CPT

## 2018-05-04 PROCEDURE — 74019 RADEX ABDOMEN 2 VIEWS: CPT

## 2018-05-04 PROCEDURE — 86900 BLOOD TYPING SEROLOGIC ABO: CPT

## 2018-05-04 PROCEDURE — 74176 CT ABD & PELVIS W/O CONTRAST: CPT

## 2018-05-04 PROCEDURE — 83735 ASSAY OF MAGNESIUM: CPT

## 2018-05-04 PROCEDURE — 82962 GLUCOSE BLOOD TEST: CPT

## 2018-05-04 PROCEDURE — 97162 PT EVAL MOD COMPLEX 30 MIN: CPT

## 2018-05-04 PROCEDURE — 85027 COMPLETE CBC AUTOMATED: CPT

## 2018-05-04 PROCEDURE — 86901 BLOOD TYPING SEROLOGIC RH(D): CPT

## 2018-05-04 PROCEDURE — 83605 ASSAY OF LACTIC ACID: CPT

## 2018-05-04 PROCEDURE — 97110 THERAPEUTIC EXERCISES: CPT

## 2018-05-04 PROCEDURE — 86850 RBC ANTIBODY SCREEN: CPT

## 2018-05-04 PROCEDURE — 84132 ASSAY OF SERUM POTASSIUM: CPT

## 2018-05-04 RX ORDER — RANOLAZINE 500 MG/1
1 TABLET, FILM COATED, EXTENDED RELEASE ORAL
Qty: 0 | Refills: 0 | COMMUNITY

## 2018-05-04 RX ORDER — SIMVASTATIN 20 MG/1
1 TABLET, FILM COATED ORAL
Qty: 0 | Refills: 0 | DISCHARGE
Start: 2018-05-04

## 2018-05-04 RX ORDER — METOPROLOL TARTRATE 50 MG
1 TABLET ORAL
Qty: 0 | Refills: 0 | COMMUNITY

## 2018-05-04 RX ORDER — INSULIN DETEMIR 100/ML (3)
30 INSULIN PEN (ML) SUBCUTANEOUS
Qty: 0 | Refills: 0 | COMMUNITY

## 2018-05-04 RX ORDER — TAMSULOSIN HYDROCHLORIDE 0.4 MG/1
1 CAPSULE ORAL
Qty: 0 | Refills: 0 | DISCHARGE
Start: 2018-05-04

## 2018-05-04 RX ORDER — INSULIN LISPRO 100/ML
VIAL (ML) SUBCUTANEOUS EVERY 6 HOURS
Qty: 0 | Refills: 0 | Status: DISCONTINUED | OUTPATIENT
Start: 2018-05-04 | End: 2018-05-04

## 2018-05-04 RX ORDER — QUETIAPINE FUMARATE 200 MG/1
0 TABLET, FILM COATED ORAL
Qty: 0 | Refills: 0 | COMMUNITY

## 2018-05-04 RX ORDER — ASPIRIN/CALCIUM CARB/MAGNESIUM 324 MG
1 TABLET ORAL
Qty: 0 | Refills: 0 | DISCHARGE
Start: 2018-05-04

## 2018-05-04 RX ORDER — SODIUM,POTASSIUM PHOSPHATES 278-250MG
1 POWDER IN PACKET (EA) ORAL EVERY 8 HOURS
Qty: 0 | Refills: 0 | Status: DISCONTINUED | OUTPATIENT
Start: 2018-05-04 | End: 2018-05-04

## 2018-05-04 RX ORDER — SIMETHICONE 80 MG/1
1 TABLET, CHEWABLE ORAL
Qty: 0 | Refills: 0 | DISCHARGE
Start: 2018-05-04

## 2018-05-04 RX ORDER — FERROUS GLUCONATE 100 %
0 POWDER (GRAM) MISCELLANEOUS
Qty: 0 | Refills: 0 | COMMUNITY

## 2018-05-04 RX ORDER — DOCUSATE SODIUM 100 MG
1 CAPSULE ORAL
Qty: 0 | Refills: 0 | DISCHARGE
Start: 2018-05-04

## 2018-05-04 RX ORDER — SODIUM CHLORIDE 9 MG/ML
2 INJECTION INTRAMUSCULAR; INTRAVENOUS; SUBCUTANEOUS THREE TIMES A DAY
Qty: 0 | Refills: 0 | Status: DISCONTINUED | OUTPATIENT
Start: 2018-05-04 | End: 2018-05-04

## 2018-05-04 RX ORDER — ENOXAPARIN SODIUM 100 MG/ML
40 INJECTION SUBCUTANEOUS
Qty: 0 | Refills: 0 | DISCHARGE
Start: 2018-05-04

## 2018-05-04 RX ORDER — GABAPENTIN 400 MG/1
2 CAPSULE ORAL
Qty: 0 | Refills: 0 | DISCHARGE
Start: 2018-05-04

## 2018-05-04 RX ORDER — TAMSULOSIN HYDROCHLORIDE 0.4 MG/1
1 CAPSULE ORAL
Qty: 0 | Refills: 0 | COMMUNITY

## 2018-05-04 RX ORDER — SENNA PLUS 8.6 MG/1
2 TABLET ORAL
Qty: 0 | Refills: 0 | DISCHARGE
Start: 2018-05-04

## 2018-05-04 RX ORDER — MAGNESIUM SULFATE 500 MG/ML
1 VIAL (ML) INJECTION ONCE
Qty: 0 | Refills: 0 | Status: COMPLETED | OUTPATIENT
Start: 2018-05-04 | End: 2018-05-04

## 2018-05-04 RX ORDER — SODIUM,POTASSIUM PHOSPHATES 278-250MG
1 POWDER IN PACKET (EA) ORAL
Qty: 0 | Refills: 0 | Status: DISCONTINUED | OUTPATIENT
Start: 2018-05-04 | End: 2018-05-04

## 2018-05-04 RX ORDER — FAMOTIDINE 10 MG/ML
1 INJECTION INTRAVENOUS
Qty: 0 | Refills: 0 | COMMUNITY

## 2018-05-04 RX ORDER — QUETIAPINE FUMARATE 200 MG/1
1 TABLET, FILM COATED ORAL
Qty: 0 | Refills: 0 | DISCHARGE
Start: 2018-05-04

## 2018-05-04 RX ORDER — POTASSIUM CHLORIDE 20 MEQ
20 PACKET (EA) ORAL
Qty: 0 | Refills: 0 | Status: COMPLETED | OUTPATIENT
Start: 2018-05-04 | End: 2018-05-04

## 2018-05-04 RX ORDER — METHOCARBAMOL 500 MG/1
1 TABLET, FILM COATED ORAL
Qty: 0 | Refills: 0 | DISCHARGE
Start: 2018-05-04

## 2018-05-04 RX ORDER — SIMVASTATIN 20 MG/1
1 TABLET, FILM COATED ORAL
Qty: 0 | Refills: 0 | COMMUNITY

## 2018-05-04 RX ORDER — GABAPENTIN 400 MG/1
3 CAPSULE ORAL
Qty: 0 | Refills: 0 | DISCHARGE
Start: 2018-05-04

## 2018-05-04 RX ORDER — INSULIN LISPRO 100/ML
VIAL (ML) SUBCUTANEOUS
Qty: 0 | Refills: 0 | Status: DISCONTINUED | OUTPATIENT
Start: 2018-05-04 | End: 2018-05-04

## 2018-05-04 RX ORDER — METHOCARBAMOL 500 MG/1
1 TABLET, FILM COATED ORAL
Qty: 0 | Refills: 0 | COMMUNITY
Start: 2018-05-04

## 2018-05-04 RX ORDER — LORATADINE 10 MG/1
1 TABLET ORAL
Qty: 0 | Refills: 0 | COMMUNITY

## 2018-05-04 RX ORDER — LEVOTHYROXINE SODIUM 125 MCG
1 TABLET ORAL
Qty: 0 | Refills: 0 | DISCHARGE
Start: 2018-05-04

## 2018-05-04 RX ORDER — METOPROLOL TARTRATE 50 MG
50 TABLET ORAL EVERY 12 HOURS
Qty: 0 | Refills: 0 | Status: DISCONTINUED | OUTPATIENT
Start: 2018-05-04 | End: 2018-05-04

## 2018-05-04 RX ORDER — LOSARTAN POTASSIUM 100 MG/1
1 TABLET, FILM COATED ORAL
Qty: 0 | Refills: 0 | DISCHARGE
Start: 2018-05-04

## 2018-05-04 RX ORDER — GABAPENTIN 400 MG/1
2 CAPSULE ORAL
Qty: 0 | Refills: 0 | COMMUNITY

## 2018-05-04 RX ORDER — INSULIN LISPRO 100/ML
6 VIAL (ML) SUBCUTANEOUS ONCE
Qty: 0 | Refills: 0 | Status: COMPLETED | OUTPATIENT
Start: 2018-05-04 | End: 2018-05-04

## 2018-05-04 RX ADMIN — Medication 50 MILLIGRAM(S): at 16:24

## 2018-05-04 RX ADMIN — Medication 100 GRAM(S): at 12:03

## 2018-05-04 RX ADMIN — Medication 81 MILLIGRAM(S): at 12:13

## 2018-05-04 RX ADMIN — Medication 10 MILLIGRAM(S): at 00:23

## 2018-05-04 RX ADMIN — Medication 5 MILLIGRAM(S): at 00:22

## 2018-05-04 RX ADMIN — Medication 20 MILLIEQUIVALENT(S): at 16:23

## 2018-05-04 RX ADMIN — FAMOTIDINE 20 MILLIGRAM(S): 10 INJECTION INTRAVENOUS at 05:40

## 2018-05-04 RX ADMIN — LOSARTAN POTASSIUM 100 MILLIGRAM(S): 100 TABLET, FILM COATED ORAL at 05:39

## 2018-05-04 RX ADMIN — Medication 5 MILLIGRAM(S): at 05:39

## 2018-05-04 RX ADMIN — Medication 4: at 13:05

## 2018-05-04 RX ADMIN — GABAPENTIN 200 MILLIGRAM(S): 400 CAPSULE ORAL at 05:39

## 2018-05-04 RX ADMIN — Medication 1 DROP(S): at 12:08

## 2018-05-04 RX ADMIN — METHOCARBAMOL 750 MILLIGRAM(S): 500 TABLET, FILM COATED ORAL at 13:06

## 2018-05-04 RX ADMIN — Medication 6 UNIT(S): at 16:18

## 2018-05-04 RX ADMIN — TAMSULOSIN HYDROCHLORIDE 0.4 MILLIGRAM(S): 0.4 CAPSULE ORAL at 12:13

## 2018-05-04 RX ADMIN — Medication 25 MICROGRAM(S): at 05:40

## 2018-05-04 RX ADMIN — ENOXAPARIN SODIUM 40 MILLIGRAM(S): 100 INJECTION SUBCUTANEOUS at 12:08

## 2018-05-04 RX ADMIN — SODIUM CHLORIDE 2 GRAM(S): 9 INJECTION INTRAMUSCULAR; INTRAVENOUS; SUBCUTANEOUS at 14:42

## 2018-05-04 RX ADMIN — Medication 20 MILLIEQUIVALENT(S): at 12:03

## 2018-05-04 RX ADMIN — Medication 20 MILLIEQUIVALENT(S): at 14:41

## 2018-05-04 RX ADMIN — Medication 1 TABLET(S): at 12:03

## 2018-05-04 NOTE — PROVIDER CONTACT NOTE (OTHER) - ASSESSMENT
PT asymptomatic denies chest pain dizziness and SOB, b/p elevated 181/74
Pt A+Ox4. /100 manual,  palpated. No c/o chest pain, SOB, no signs of distres
Pt A+Ox4. Metoprolol administered. Woke patient to reassess VS. /95 and . NO pain or discomfort. NO SOB or chest pain
pt hr 120, bp- 183/95 temp 99.4. pt has productive cough. pt denies sob and chest pain. pt a&Ox4
pt presented with NGT laying next to him, pt states he doesn't know what happened, pt A+Ox4, bed alarm on, VSS, pt states no c/o pain or discomfort at this time.
rectal tube noted

## 2018-05-04 NOTE — DISCHARGE NOTE ADULT - ADDITIONAL INSTRUCTIONS
Please check BMP AND  phos level on 5/ 6 and supplement as needed SODIUM AND POTASSIUM -last sodium 131 on 5/4   Please give trial of void on-dc shoemaker-5/5   Please check fingersticks daily.   if notices any new weakness, numbness, tingling, severe pain, fever then please come back to hospital.  Please add miralax to regimen if patient gets constipated.  Please remove surgical site suture on 5/15 /2018 Please check BMP AND  phos level on 5/ 6 and supplement as needed SODIUM AND POTASSIUM -last sodium 131 on 5/4   Please give trial of void on-dc shoemaker-5/5   Please check fingersticks daily.   if notices any new weakness, numbness, tingling, severe pain, fever then please come back to hospital.  Please add miralax to regimen if patient gets constipated.  Please remove surgical site suture on 5/15 /2018  May take shower 4 days after surgery- please cover surgical site with tegaderm before shower-remove and pat dry after shower. Please check BMP AND  phos level on 5/ 6 and supplement as needed SODIUM AND POTASSIUM -last sodium 131 on 5/4   Please give trial of void on-dc shoemaker-5/5   Please check fingersticks before meal for 24 hours and if its elevated please cover with regular insulin sliding scale until oral meds as restarted.    if notices any new weakness, numbness, tingling, severe pain, fever then please come back to hospital.  Please add miralax to regimen if patient gets constipated.  Please remove surgical site suture on 5/15 /2018  May take shower 4 days after surgery- please cover surgical site with tegaderm before shower-remove and pat dry after shower. Please check BMP AND  phos level on 5/ 6 and supplement as needed SODIUM AND POTASSIUM -last sodium 131 on 5/4   Please give trial of void on-dc shoemaker-5/5   Please check fingersticks before meals and if its above 300 please contact Dr. Burr at [phone]   Patient has taken first dose of janumet on 5/4 at 4 pm   if notices any new weakness, numbness, tingling, severe pain, fever then please come back to hospital.  Please add miralax to regimen if patient gets constipated.  Please remove surgical site suture on 5/15 /2018  May take shower 4 days after surgery- please cover surgical site with tegaderm before shower-remove and pat dry after shower.

## 2018-05-04 NOTE — PROGRESS NOTE ADULT - NSHPATTENDINGPLANDISCUSS_GEN_ALL_CORE
Pt and wife and Britany SOLIZ)
Pt and wife and Ulises Wilkes (PA)
Pt and family and Britany SOLIZ)
Pt and family and Ehsan SOLIZ)

## 2018-05-04 NOTE — PROGRESS NOTE ADULT - ASSESSMENT
Assessment: This is a 72 year old male with no prior abdominal surgeries who underwent a lumbar spine surgery on 04/30/2018, has colonic ileus, with no evidence of Olgivie's syndrome.     - Avoid narcotics   - Replete electrolytes (please check Mg, P, K level)   - IVF/NPO  - Encourage ambulation twice a day instead of once a day    - Awaiting GI function, we do not recommend prokinetics  - Will continue to follow, discussed with Dr. Marco Antonio Leija     [id number]

## 2018-05-04 NOTE — PROVIDER CONTACT NOTE (OTHER) - DATE AND TIME:
01-May-2018 22:43
02-May-2018 21:40
03-May-2018 01:55
03-May-2018 04:00
03-May-2018 21:45
01-May-2018 06:00

## 2018-05-04 NOTE — PROVIDER CONTACT NOTE (OTHER) - BACKGROUND
s/p lumbar fusion hx MI, stented coronary artery, CAD HTN
4/30- L3 hudson lami w/ synovial cyst removal, L3-5 fusion
4/30- L3 hudson lami w/ synovial cyst removal, L3-5 fusion
pt had a GI consult and he was the one who inserted same into pt's rectum
s/p laminectomy with cyst removal
s/p lumbar lami with cyst removal

## 2018-05-04 NOTE — PROGRESS NOTE ADULT - ASSESSMENT
71 yo male s/p Left l3 hemilaminectomy removal of synovial cyst and extension of fusion l3-l5 4/30   Patient was admitted on 4/30 with low back pain and radiculopathy

## 2018-05-04 NOTE — DISCHARGE NOTE ADULT - CARE PLAN
Principal Discharge DX:	Lumbar radiculopathy  Goal:	Increase activity  Assessment and plan of treatment:	Follow up with Dr. Banegas after rehab-Neurosurgeon-Please call office to confirm appointment.   Follow up with PMD as outpatient   Follow up with cardiologist as outpatient Principal Discharge DX:	Lumbar radiculopathy  Goal:	Increase activity  Assessment and plan of treatment:	Follow up with Dr. Banegas after rehab-Neurosurgeon-Please call office to confirm appointment.   Follow up with PMD Dr. Burr  as outpatient    Follow up with cardiologist as outpatient Principal Discharge DX:	Lumbar radiculopathy  Goal:	Increase activity  Assessment and plan of treatment:	Follow up with Dr. Banegas after rehab-Neurosurgeon-Please call office to confirm appointment.   Follow up with PMD or  Dr. Burr  as outpatient    Follow up with cardiologist as outpatient  Please remove shoemaker on 5/5 AM to give trial of void.

## 2018-05-04 NOTE — DISCHARGE NOTE ADULT - MEDICATION SUMMARY - MEDICATIONS TO STOP TAKING
I will STOP taking the medications listed below when I get home from the hospital:    Ranexa 500 mg oral tablet, extended release  -- 1 tab(s) by mouth 2 times a day    tylenol3  -- orally 2 times a day, As Needed

## 2018-05-04 NOTE — PROVIDER CONTACT NOTE (OTHER) - RECOMMENDATIONS
B/p meds given as ordered losartan and metoprolol
MD Melinda duval made aware
Notify NSX
Notify NSXS
asked MD
provider see pt

## 2018-05-04 NOTE — DISCHARGE NOTE ADULT - CARE PROVIDER_API CALL
Roni Banegas (MD), Neurological Surgery  [address] [place] [address]  Phone: [phone]  Fax: [phone] Roni Banegas), Neurological Surgery  [address] [facility] [address]  [place] [address]  Phone: [phone]  Fax: [phone]    Kevno Burr (EUSEBIO), Internal Medicine  [address] [place] [address]  Phone: [phone]  Fax: [phone]

## 2018-05-04 NOTE — PROGRESS NOTE ADULT - SUBJECTIVE AND OBJECTIVE BOX
JERRI SIDHU:[id number],   72yMale followed for:  No Known Allergies    PAST MEDICAL & SURGICAL HISTORY:  Lumbar radiculopathy  OA (osteoarthritis)  Diverticulitis  MI (myocardial infarction): 12/2014  Lumbar disc disorder  Benign hypertrophy of prostate  Thyroid disease  Depression  Stented coronary artery  CAD (coronary artery disease): cardiac stents x7  Diabetes: type 2  Hyperlipidemia  Hypertension  H/O heart artery stent  History of back surgery: lumbar  History of cardiac catheterization: 2010 2 stents, 2012 3 stents, 12/2014 2 coronary artery stents  S/P shoulder surgery: s/p R rotator cuff surgery  S/P knee replacement, bilateral    FAMILY HISTORY:  Family history of heart disease (Sibling)    MEDICATIONS  (STANDING):  artificial  tears Solution 1 Drop(s) Both EYES daily  aspirin enteric coated 81 milliGRAM(s) Oral daily  dextrose 5%. 1000 milliLiter(s) (50 mL/Hr) IV Continuous <Continuous>  dextrose 5%. 1000 milliLiter(s) (50 mL/Hr) IV Continuous <Continuous>  dextrose 50% Injectable 12.5 Gram(s) IV Push once  dextrose 50% Injectable 25 Gram(s) IV Push once  dextrose 50% Injectable 25 Gram(s) IV Push once  dextrose 50% Injectable 12.5 Gram(s) IV Push once  dextrose 50% Injectable 25 Gram(s) IV Push once  dextrose 50% Injectable 25 Gram(s) IV Push once  docusate sodium 100 milliGRAM(s) Oral three times a day  enoxaparin Injectable 40 milliGRAM(s) SubCutaneous daily  famotidine    Tablet 20 milliGRAM(s) Oral two times a day  gabapentin 200 milliGRAM(s) Oral two times a day  insulin lispro (HumaLOG) corrective regimen sliding scale   SubCutaneous at bedtime  insulin lispro (HumaLOG) corrective regimen sliding scale   SubCutaneous three times a day before meals  labetalol Injectable 10 milliGRAM(s) IV Push once  levothyroxine 25 MICROGram(s) Oral daily  losartan 100 milliGRAM(s) Oral daily  methocarbamol 750 milliGRAM(s) Oral every 8 hours  metoprolol tartrate Injectable 5 milliGRAM(s) IV Push once  metoprolol tartrate Injectable 5 milliGRAM(s) IV Push every 6 hours  QUEtiapine 50 milliGRAM(s) Oral at bedtime  senna 2 Tablet(s) Oral at bedtime  simvastatin 20 milliGRAM(s) Oral at bedtime  sodium chloride 0.9% with potassium chloride 20 mEq/L 1000 milliLiter(s) (50 mL/Hr) IV Continuous <Continuous>  tamsulosin 0.4 milliGRAM(s) Oral daily    MEDICATIONS  (PRN):  bisacodyl Suppository 10 milliGRAM(s) Rectal daily PRN Constipation  dextrose Gel 1 Dose(s) Oral once PRN Blood Glucose LESS THAN 70 milliGRAM(s)/deciliter  dextrose Gel 1 Dose(s) Oral once PRN Blood Glucose LESS THAN 70 milliGRAM(s)/deciliter  glucagon  Injectable 1 milliGRAM(s) IntraMuscular once PRN Glucose LESS THAN 70 milligrams/deciliter  glucagon  Injectable 1 milliGRAM(s) IntraMuscular once PRN Glucose LESS THAN 70 milligrams/deciliter  HYDROmorphone  Injectable 0.5 milliGRAM(s) IV Push every 6 hours PRN Breakthrough pain  ondansetron Injectable 4 milliGRAM(s) IV Push every 6 hours PRN Nausea  simethicone 80 milliGRAM(s) Chew every 6 hours PRN Gas      Vital Signs Last 24 Hrs  T(C): 36.7 (04 May 2018 05:18), Max: 37.4 (03 May 2018 21:38)  T(F): 98 (04 May 2018 05:18), Max: 99.4 (03 May 2018 21:38)  HR: 121 (04 May 2018 05:18) (108 - 138)  BP: 140/84 (04 May 2018 05:18) (140/84 - 183/95)  BP(mean): --  RR: 18 (04 May 2018 05:18) (18 - 18)  SpO2: 93% (04 May 2018 05:18) (93% - 97%)  nc/at  s1s2  cta  soft, nt, nd no guarding or rebound  no c/c/e    CBC Full  -  ( 04 May 2018 06:48 )  WBC Count : 8.1 K/uL  Hemoglobin : 10.8 g/dL  Hematocrit : 31.7 %  Platelet Count - Automated : 282 K/uL  Mean Cell Volume : 98.4 fl  Mean Cell Hemoglobin : 33.6 pg  Mean Cell Hemoglobin Concentration : 34.2 gm/dL  Auto Neutrophil # : x  Auto Lymphocyte # : x  Auto Monocyte # : x  Auto Eosinophil # : x  Auto Basophil # : x  Auto Neutrophil % : x  Auto Lymphocyte % : x  Auto Monocyte % : x  Auto Eosinophil % : x  Auto Basophil % : x    05-04    131<L>  |  95<L>  |  10  ----------------------------<  190<H>  3.3<L>   |  21<L>  |  0.79    Ca    8.2<L>      04 May 2018 06:55  Phos  1.9     05-04  Mg     1.7     05-04

## 2018-05-04 NOTE — CHART NOTE - NSCHARTNOTEFT_GEN_A_CORE
I check a random glucose level at 4 pm and it was noted to be 380. I called Dr. Burr regarding management and to ensure clearance for discharge. I gave 6 units of humalog as per recs and I instructed the patient to take first dose of Janumet now. I have instructed rehab to recheck sugar before dinner and call Dr. Burr if its above 300. Also patient will be receiving lantus tonight as the routine home coverage.

## 2018-05-04 NOTE — DISCHARGE NOTE ADULT - CARE PROVIDERS DIRECT ADDRESSES
,[email or web address],DirectAddress_Unknown

## 2018-05-04 NOTE — PROVIDER CONTACT NOTE (OTHER) - ACTION/TREATMENT ORDERED:
monitor b/p if still elevated after standing b/p meds they will add something else
MD Melinda duval made aware
MD stated it is to relieve gas and for decompression- he has to keep it until further notice- informed Pt and pt understood. will monitor.
Spoke with MD Almas. Stated she would review pt VS in chart and notify if any interventions are required. Will monitor pt closely.
Spoke with MD Almas. placed one time order for labetelol IVP. Will administer medication and monitor pt closely.
will recheck blood pressure, will cont to monitor. one time dose of ivp metoprolol ordered if needed. after rechecking vital sign, blood pressure and hr improved provider made aware will cont to monit

## 2018-05-04 NOTE — PROGRESS NOTE ADULT - SUBJECTIVE AND OBJECTIVE BOX
SUBJECTIVE: Patient was seen and evaluated at bedside. Patient is resting in bed and is in no new acute distress.     OVERNIGHT EVENTS: none     Vital Signs Last 24 Hrs  T(C): 36.9 (04 May 2018 08:00), Max: 37.4 (03 May 2018 21:38)  T(F): 98.4 (04 May 2018 08:00), Max: 99.4 (03 May 2018 21:38)  HR: 111 (04 May 2018 10:49) (106 - 138)  BP: 161/86 (04 May 2018 10:49) (140/84 - 183/95)  BP(mean): --  RR: 18 (04 May 2018 08:00) (18 - 18)  SpO2: 95% (04 May 2018 08:00) (93% - 97%)    PHYSICAL EXAM:     General: No Acute Distress     Neurological: Awake, alert oriented to person, place and time, Following Commands, PERRL, EOMI, Face Symmetrical, Speech Fluent, Moving all extremities, Muscle Strength normal in all four extremities, No Drift, Sensation to Light Touch Intact    Pulmonary: Clear to Auscultation, No Rales, No Rhonchi, No Wheezes     Cardiovascular: S1, S2, Regular Rate and Rhythm     Gastrointestinal: Soft, distension improved, passing gas and has bowel sounds     Incision: clean and dry     LABS:                        10.8   8.1   )-----------( 282      ( 04 May 2018 06:48 )             31.7    05-04    131<L>  |  95<L>  |  10  ----------------------------<  190<H>  3.3<L>   |  21<L>  |  0.79    Ca    8.2<L>      04 May 2018 06:55  Phos  1.9     05-04  Mg     1.7     05-04      Hemoglobin A1C, Whole Blood: 6.2 % (04-09 @ 14:47)      05-03 @ 07:01  -  05-04 @ 07:00  --------------------------------------------------------  IN: 120 mL / OUT: 1090 mL / NET: -970 mL    05-04 @ 07:01 - 05-04 @ 13:37  --------------------------------------------------------  IN: 480 mL / OUT: 800 mL / NET: -320 mL      DRAINS: hemovac 10 cc     MEDICATIONS:  Antibiotics:    Neuro:  gabapentin 200 milliGRAM(s) Oral two times a day  HYDROmorphone  Injectable 0.5 milliGRAM(s) IV Push every 6 hours PRN Breakthrough pain  methocarbamol 750 milliGRAM(s) Oral every 8 hours  ondansetron Injectable 4 milliGRAM(s) IV Push every 6 hours PRN Nausea  QUEtiapine 50 milliGRAM(s) Oral at bedtime    Cardiac:  labetalol Injectable 10 milliGRAM(s) IV Push once  losartan 100 milliGRAM(s) Oral daily  metoprolol tartrate 50 milliGRAM(s) Oral every 12 hours  tamsulosin 0.4 milliGRAM(s) Oral daily    Pulm:    GI/:  bisacodyl Suppository 10 milliGRAM(s) Rectal daily PRN Constipation  docusate sodium 100 milliGRAM(s) Oral three times a day  famotidine    Tablet 20 milliGRAM(s) Oral two times a day  senna 2 Tablet(s) Oral at bedtime  simethicone 80 milliGRAM(s) Chew every 6 hours PRN Gas    Other:   artificial  tears Solution 1 Drop(s) Both EYES daily  aspirin enteric coated 81 milliGRAM(s) Oral daily  dextrose 5%. 1000 milliLiter(s) IV Continuous <Continuous>  dextrose 5%. 1000 milliLiter(s) IV Continuous <Continuous>  dextrose 50% Injectable 12.5 Gram(s) IV Push once  dextrose 50% Injectable 25 Gram(s) IV Push once  dextrose 50% Injectable 25 Gram(s) IV Push once  dextrose 50% Injectable 12.5 Gram(s) IV Push once  dextrose 50% Injectable 25 Gram(s) IV Push once  dextrose 50% Injectable 25 Gram(s) IV Push once  dextrose Gel 1 Dose(s) Oral once PRN Blood Glucose LESS THAN 70 milliGRAM(s)/deciliter  dextrose Gel 1 Dose(s) Oral once PRN Blood Glucose LESS THAN 70 milliGRAM(s)/deciliter  enoxaparin Injectable 40 milliGRAM(s) SubCutaneous daily  glucagon  Injectable 1 milliGRAM(s) IntraMuscular once PRN Glucose LESS THAN 70 milligrams/deciliter  glucagon  Injectable 1 milliGRAM(s) IntraMuscular once PRN Glucose LESS THAN 70 milligrams/deciliter  insulin lispro (HumaLOG) corrective regimen sliding scale   SubCutaneous three times a day before meals  insulin lispro (HumaLOG) corrective regimen sliding scale   SubCutaneous at bedtime  levothyroxine 25 MICROGram(s) Oral daily  potassium acid phosphate/sodium acid phosphate tablet (K-PHOS No. 2) 1 Tablet(s) Oral every 8 hours  potassium chloride    Tablet ER 20 milliEquivalent(s) Oral every 2 hours  simvastatin 20 milliGRAM(s) Oral at bedtime  sodium chloride 2 Gram(s) Oral three times a day  sodium chloride 0.9% with potassium chloride 20 mEq/L 1000 milliLiter(s) IV Continuous <Continuous>    DIET: [] Regular [] CCD [] Renal [] Puree [] Dysphagia [] Tube Feeds: regular     IMAGING: no new imaging

## 2018-05-04 NOTE — DISCHARGE NOTE ADULT - NS AS ACTIVITY OBS
Do not make important decisions/Showering allowed/Walking-Indoors allowed/Walking-Outdoors allowed/Stairs allowed/No Heavy lifting/straining

## 2018-05-04 NOTE — PROGRESS NOTE ADULT - PROBLEM SELECTOR PLAN 1
s/p fusion   1Out of bed   2 continue PT   3 PRN pain meds   4 dvt ppx sql scds   5 continue aspirin   6 continue losartan and metoprolol   7 continue gabapentin   8 robaxin for muscle spasm   9 continue zoccor   10 continue synthroid   11 advance to regular diabetic diet   12 stool softeners   13 dc to rehab today   14 hemovac dcd on 5/4

## 2018-05-04 NOTE — DISCHARGE NOTE ADULT - HOSPITAL COURSE
Patient was admitted on 4/30 and had a left l3 hemilaminectomy and removal of synovial cyst and extension of fusion-l3-l5. Post surgery patient was admitted to pacu and was monitored. Patient was given pain meds, ivf and was started on routine home meds. Patient was transferred to floor once stable. Patients hemovac drain removed on 4/30 and 5/4. Patient was also followed by medicine. Patient was found to have ileus and was followed by gi and general surgery. Patient was started on laxatives and had abdominal ct scan. Patient had a bm and diet was advanced. Patient was seen and cleared by physical therapy for discharge to rehab. Patient was discharged on 5/4 with follow up instructions.

## 2018-05-04 NOTE — DISCHARGE NOTE ADULT - REASON FOR ADMISSION
Patient was admitted on 4/30 with history of low back pain. Patient had a left L3 hemilaminectomy synovial cyst removal and extension of fusion l3-l5

## 2018-05-04 NOTE — PROVIDER CONTACT NOTE (OTHER) - SITUATION
PT asymptomatic denies chest pain dizziness and SOB, b/p elevated 181/74
/100
/95 and 
pt has elevated hr and bp.
pt informed RN that he had a tube in his rectum and he wanted to know if it can come out
pt pulled out NGT

## 2018-05-04 NOTE — PROGRESS NOTE ADULT - ASSESSMENT
Assessment: This is a 72 year old male with no prior abdominal surgeries who underwent a lumbar spine surgery on 04/30/2018, has colonic ileus, with no evidence of Olgivie's syndrome.     - Avoid narcotics   - Replete electrolytes (please check Mg, P, K level)   - Encourage ambulation twice a day instead of once a day    - Awaiting GI function, we do not recommend prokinetics      [id number]

## 2018-05-04 NOTE — DISCHARGE NOTE ADULT - MEDICATION SUMMARY - MEDICATIONS TO TAKE
I will START or STAY ON the medications listed below when I get home from the hospital:    aspirin 81 mg oral delayed release tablet  -- 1 tab(s) by mouth once a day  -- Indication: For antiplatelet agent     losartan 100 mg oral tablet  -- 1 tab(s) by mouth once a day  -- Indication: For blood pressure     tamsulosin 0.4 mg oral capsule  -- 1 cap(s) by mouth once a day  -- Indication: For urinary retention     enoxaparin  -- 40 milligram(s) subcutaneous once a day (at bedtime)  -- Indication: For Dvt ppx     gabapentin 100 mg oral capsule  -- 2 cap(s) by mouth 2 times a day  -- Indication: For pain    pioglitazone 15 mg oral tablet  -- 1 tab(s) by mouth once a day  -- Indication: For Diabetes     Janumet 50 mg-1000 mg oral tablet  -- 1 tab(s) by mouth 2 times a day  -- Indication: For Diabetes     Levemir 100 units/mL subcutaneous solution  -- 30 unit(s) subcutaneous once a day (at bedtime)  -- Indication: For Diabetes     simvastatin 20 mg oral tablet  -- 1 tab(s) by mouth once a day (at bedtime)  -- Indication: For cholesterol    QUEtiapine 50 mg oral tablet  -- 1 tab(s) by mouth once a day (at bedtime)  -- Indication: For mood     Lopressor 50 mg oral tablet  -- 1 tab(s) by mouth 2 times a day  -- Indication: For blood pressure    docusate sodium 100 mg oral capsule  -- 1 cap(s) by mouth 3 times a day  -- Indication: For stool softener     senna oral tablet  -- 2 tab(s) by mouth once a day (at bedtime)  -- Indication: For stool softener     Sodium Chloride 1 g oral tablet  -- 1 tab(s) by mouth 3 times a day for one day   -- Indication: For salt tab     potassium phosphate-sodium phosphate 155 mg-350 mg oral tablet  -- 1 tab(s) by mouth 3 times a day for one day only   -- Indication: For supplement     simethicone 80 mg oral tablet, chewable  -- 1 tab(s) by mouth every 6 hours, As needed, Gas  -- Indication: For gas     methocarbamol 750 mg oral tablet  -- 1 tab(s) by mouth every 8 hours  -- Indication: For muscle rellaxant     ocular lubricant ophthalmic solution  -- 1 drop(s) to each affected eye once a day  -- Indication: For eye solution    levothyroxine 25 mcg (0.025 mg) oral tablet  -- 1 tab(s) by mouth once a day  -- Indication: For thyroid I will START or STAY ON the medications listed below when I get home from the hospital:    aspirin 81 mg oral delayed release tablet  -- 1 tab(s) by mouth once a day  -- Indication: For antiplatelet agent     losartan 100 mg oral tablet  -- 1 tab(s) by mouth once a day  -- Indication: For blood pressure     tamsulosin 0.4 mg oral capsule  -- 1 cap(s) by mouth once a day  -- Indication: For urinary retention     enoxaparin  -- 40 milligram(s) subcutaneous once a day (at bedtime)  -- Indication: For Dvt ppx     gabapentin 100 mg oral capsule  -- 2 cap(s) by mouth 2 times a day  -- Indication: For pain    pioglitazone 15 mg oral tablet  -- 1 tab(s) by mouth once a day  -- Indication: For Diabetes     Janumet 50 mg-1000 mg oral tablet  -- 1 tab(s) by mouth 2 times a day  -- Indication: For Diabetes     Levemir 100 units/mL subcutaneous solution  -- 30 unit(s) subcutaneous once a day (at bedtime)  -- Indication: For Diabetes     simvastatin 20 mg oral tablet  -- 1 tab(s) by mouth once a day (at bedtime)  -- Indication: For cholesterol    QUEtiapine 50 mg oral tablet  -- 1 tab(s) by mouth once a day (at bedtime)  -- Indication: For mood     Lopressor 50 mg oral tablet  -- 1 tab(s) by mouth 2 times a day  -- Indication: For blood pressure    docusate sodium 100 mg oral capsule  -- 1 cap(s) by mouth 3 times a day  -- Indication: For stool softener     senna oral tablet  -- 2 tab(s) by mouth once a day (at bedtime)  -- Indication: For stool softener     Sodium Chloride 1 g oral tablet  -- 1 tab(s) by mouth 3 times a day for one day   -- Indication: For salt tab     potassium phosphate-sodium phosphate 155 mg-350 mg oral tablet  -- 1 tab(s) by mouth 3 times a day for one day only   -- Indication: For supplement     simethicone 80 mg oral tablet, chewable  -- 1 tab(s) by mouth every 6 hours, As needed, Gas  -- Indication: For gas     methocarbamol 750 mg oral tablet  -- 1 tab(s) by mouth every 8 hours as needed for muscle spasm   -- Indication: For muscle relaxant     ocular lubricant ophthalmic solution  -- 1 drop(s) to each affected eye once a day  -- Indication: For eye solution    levothyroxine 25 mcg (0.025 mg) oral tablet  -- 1 tab(s) by mouth once a day  -- Indication: For thyroid I will START or STAY ON the medications listed below when I get home from the hospital:    aspirin 81 mg oral delayed release tablet  -- 1 tab(s) by mouth once a day  -- Indication: For antiplatelet agent     losartan 100 mg oral tablet  -- 1 tab(s) by mouth once a day  -- Indication: For blood pressure     tamsulosin 0.4 mg oral capsule  -- 1 cap(s) by mouth once a day  -- Indication: For urinary retention     enoxaparin  -- 40 milligram(s) subcutaneous once a day (at bedtime)  -- Indication: For Dvt ppx     gabapentin 100 mg oral capsule  -- 2 cap(s) by mouth 2 times a day  -- Indication: For pain    pioglitazone 15 mg oral tablet  -- 1 tab(s) by mouth once a day  -- Indication: For Diabetes     Janumet 50 mg-1000 mg oral tablet  -- 1 tab(s) by mouth 2 times a day  -- Indication: For Diabetes     Levemir 100 units/mL subcutaneous solution  -- 26 unit(s) subcutaneous once a day (at bedtime)  -- Indication: For Diabetes     simvastatin 20 mg oral tablet  -- 1 tab(s) by mouth once a day (at bedtime)  -- Indication: For cholesterol    QUEtiapine 50 mg oral tablet  -- 1 tab(s) by mouth once a day (at bedtime)  -- Indication: For mood     Lopressor 50 mg oral tablet  -- 1 tab(s) by mouth 2 times a day  -- Indication: For blood pressure    docusate sodium 100 mg oral capsule  -- 1 cap(s) by mouth 3 times a day  -- Indication: For stool softener     senna oral tablet  -- 2 tab(s) by mouth once a day (at bedtime)  -- Indication: For stool softener     Sodium Chloride 1 g oral tablet  -- 1 tab(s) by mouth 3 times a day for one day   -- Indication: For salt tab     potassium phosphate-sodium phosphate 155 mg-350 mg oral tablet  -- 1 tab(s) by mouth 3 times a day for one day only   -- Indication: For supplement     simethicone 80 mg oral tablet, chewable  -- 1 tab(s) by mouth every 6 hours, As needed, Gas  -- Indication: For gas     methocarbamol 750 mg oral tablet  -- 1 tab(s) by mouth every 8 hours as needed for muscle spasm   -- Indication: For muscle relaxant     ocular lubricant ophthalmic solution  -- 1 drop(s) to each affected eye once a day  -- Indication: For eye solution    levothyroxine 25 mcg (0.025 mg) oral tablet  -- 1 tab(s) by mouth once a day  -- Indication: For thyroid

## 2018-05-04 NOTE — PROGRESS NOTE ADULT - ATTENDING COMMENTS
Pt doing well.  Amb w/ PT / RW.  DC remaining hemovac.  Pt's abdominal distension is decreasing. General surgery consult appreciated. General surgery would like pt to be NPO now.  DC planning - Rehab when GI issues resolved.
Pt doing well.  Leg pain resolved.  Continue Hemovacs and 81 mg ASA.  PT consult / increase activity.
Pt doing well.  Now having bowel movements, has less abdominal pain and distension.  Continue PT.  One hemovac removed.  DC planning.
Pt stable neurologically.  Continue Hemovacs / PT / increased activity.  Pt had some abdominal distention. CT demonstrates stool in colon. Pt now on Golytely.

## 2018-05-04 NOTE — PROGRESS NOTE ADULT - PROVIDER SPECIALTY LIST ADULT
Gastroenterology
Internal Medicine
Neurosurgery
Surgery
Surgery
Neurosurgery

## 2018-05-04 NOTE — PROVIDER CONTACT NOTE (OTHER) - REASON
BP/HR elevated
Elevated BP
elevated hr and blood pressure
pt pulled out NGT
rectal tube
elevated b/p

## 2018-05-04 NOTE — PROGRESS NOTE ADULT - SUBJECTIVE AND OBJECTIVE BOX
HPI:    OVERNIGHT EVENTS:  Vital Signs Last 24 Hrs  T(C): 36.7 (04 May 2018 05:18), Max: 37.4 (03 May 2018 21:38)  T(F): 98 (04 May 2018 05:18), Max: 99.4 (03 May 2018 21:38)  HR: 121 (04 May 2018 05:18) (108 - 138)  BP: 140/84 (04 May 2018 05:18) (140/84 - 183/95)  BP(mean): --  RR: 18 (04 May 2018 05:18) (18 - 18)  SpO2: 93% (04 May 2018 05:18) (93% - 97%)    I&O's Detail    03 May 2018 07:01  -  04 May 2018 07:00  --------------------------------------------------------  IN:    Oral Fluid: 120 mL  Total IN: 120 mL    OUT:    Accordian: 15 mL    Emesis: 125 mL    Indwelling Catheter - Urethral: 950 mL  Total OUT: 1090 mL    Total NET: -970 mL        I&O's Summary    03 May 2018 07:01  -  04 May 2018 07:00  --------------------------------------------------------  IN: 120 mL / OUT: 1090 mL / NET: -970 mL        PHYSICAL EXAM:  Neurological:    Motor exam:         [x] Upper extremity                 D             B          T          WE       WF      HI                                               R         5/5        5/5        5/5       5/5     5/5       5/5                                               L          5/5        5/5        5/5       5/5     5/5       5/5         [x] Lower extremity                Ps          Ha        Quad    EHL        FHL                                               R        5/5        5/5        5/5       5/5         5/5                                               L         5/5        5/5       5/5       5/5          5/5                                                        [] warm well perfused; capillary refill <3 seconds     Sensation: [x] intact to light touch  [] decreased:     Gait Amb w/ RW and PT    Cardiovascular:  Respiratory:  Gastrointestinal:  Genitourinary:  Extremities:  Incision/Wound: Clean and dry    TUBES/LINES:  [] CVC  [] A-line  [] Lumbar Drain  [] Ventriculostomy  [] Other    DIET:  [] NPO  [] Mechanical  [] Tube feeds    LABS:                        10.8   8.1   )-----------( 282      ( 04 May 2018 06:48 )             31.7     05-04    131<L>  |  95<L>  |  10  ----------------------------<  190<H>  3.3<L>   |  21<L>  |  0.79    Ca    8.2<L>      04 May 2018 06:55  Phos  1.9     05-04  Mg     1.7     05-04              CAPILLARY BLOOD GLUCOSE      POCT Blood Glucose.: 210 mg/dL (04 May 2018 08:11)  POCT Blood Glucose.: 234 mg/dL (03 May 2018 21:52)  POCT Blood Glucose.: 214 mg/dL (03 May 2018 17:22)  POCT Blood Glucose.: 210 mg/dL (03 May 2018 12:25)          Drug Levels: [] N/A    CSF Analysis: [] N/A      Allergies    No Known Allergies    Intolerances      MEDICATIONS:  Antibiotics:    Neuro:  gabapentin 200 milliGRAM(s) Oral two times a day  HYDROmorphone  Injectable 0.5 milliGRAM(s) IV Push every 6 hours PRN  methocarbamol 750 milliGRAM(s) Oral every 8 hours  ondansetron Injectable 4 milliGRAM(s) IV Push every 6 hours PRN  QUEtiapine 50 milliGRAM(s) Oral at bedtime    Anticoagulation:  aspirin enteric coated 81 milliGRAM(s) Oral daily  enoxaparin Injectable 40 milliGRAM(s) SubCutaneous daily    OTHER:  artificial  tears Solution 1 Drop(s) Both EYES daily  bisacodyl Suppository 10 milliGRAM(s) Rectal daily PRN  dextrose 50% Injectable 12.5 Gram(s) IV Push once  dextrose 50% Injectable 25 Gram(s) IV Push once  dextrose 50% Injectable 25 Gram(s) IV Push once  dextrose 50% Injectable 12.5 Gram(s) IV Push once  dextrose 50% Injectable 25 Gram(s) IV Push once  dextrose 50% Injectable 25 Gram(s) IV Push once  dextrose Gel 1 Dose(s) Oral once PRN  dextrose Gel 1 Dose(s) Oral once PRN  docusate sodium 100 milliGRAM(s) Oral three times a day  famotidine    Tablet 20 milliGRAM(s) Oral two times a day  glucagon  Injectable 1 milliGRAM(s) IntraMuscular once PRN  glucagon  Injectable 1 milliGRAM(s) IntraMuscular once PRN  insulin lispro (HumaLOG) corrective regimen sliding scale   SubCutaneous at bedtime  insulin lispro (HumaLOG) corrective regimen sliding scale   SubCutaneous three times a day before meals  labetalol Injectable 10 milliGRAM(s) IV Push once  levothyroxine 25 MICROGram(s) Oral daily  losartan 100 milliGRAM(s) Oral daily  metoprolol tartrate Injectable 5 milliGRAM(s) IV Push once  metoprolol tartrate Injectable 5 milliGRAM(s) IV Push every 6 hours  senna 2 Tablet(s) Oral at bedtime  simethicone 80 milliGRAM(s) Chew every 6 hours PRN  simvastatin 20 milliGRAM(s) Oral at bedtime  tamsulosin 0.4 milliGRAM(s) Oral daily    IVF:  dextrose 5%. 1000 milliLiter(s) IV Continuous <Continuous>  dextrose 5%. 1000 milliLiter(s) IV Continuous <Continuous>  sodium chloride 0.9% with potassium chloride 20 mEq/L 1000 milliLiter(s) IV Continuous <Continuous>    CULTURES:    RADIOLOGY & ADDITIONAL TESTS:      ASSESSMENT:  HPI:    72y Male s/p    PLAN:  NEURO:  CARDIOVASCULAR:  PULMONARY:  RENAL:  GI:  HEME:  ID:  ENDO:    DVT PROPHYLAXIS:  [x] Venodynes                                [x] Heparin/Lovenox    FALL RISK:  [] Low Risk                                    [] Impulsive

## 2018-05-04 NOTE — PROGRESS NOTE ADULT - SUBJECTIVE AND OBJECTIVE BOX
Team [facility] PROGRESS NOTE      SUBJECTIVE: Pt seen at examined at bedside. AVSS. Reports emesis yesterday, currently not nauseous +Flatus /+BM. OOB and ambulating. Denies fever, CP, SOB, and nausea.         Vital Signs Last 24 Hrs  T(C): 36.9 (04 May 2018 08:00), Max: 37.4 (03 May 2018 21:38)  T(F): 98.4 (04 May 2018 08:00), Max: 99.4 (03 May 2018 21:38)  HR: 111 (04 May 2018 10:49) (106 - 138)  BP: 161/86 (04 May 2018 10:49) (140/84 - 183/95)  BP(mean): --  RR: 18 (04 May 2018 08:00) (18 - 18)  SpO2: 95% (04 May 2018 08:00) (93% - 97%)    Physical Exam  General: awake, alert, NAD    Pulm: respirations unlabored, no increased WOB  Abdomen: soft, mildly distended, NT  Extremities: Grossly symmetric    I&O's Summary    03 May 2018 07:01  -  04 May 2018 07:00  --------------------------------------------------------  IN: 120 mL / OUT: 1090 mL / NET: -970 mL    04 May 2018 07:01  -  04 May 2018 12:35  --------------------------------------------------------  IN: 480 mL / OUT: 800 mL / NET: -320 mL      I&O's Detail    03 May 2018 07:01  -  04 May 2018 07:00  --------------------------------------------------------  IN:    Oral Fluid: 120 mL  Total IN: 120 mL    OUT:    Accordian: 15 mL    Emesis: 125 mL    Indwelling Catheter - Urethral: 950 mL  Total OUT: 1090 mL    Total NET: -970 mL      04 May 2018 07:01  -  04 May 2018 12:35  --------------------------------------------------------  IN:    Oral Fluid: 480 mL  Total IN: 480 mL    OUT:    Indwelling Catheter - Urethral: 800 mL  Total OUT: 800 mL    Total NET: -320 mL          MEDICATIONS  (STANDING):  artificial  tears Solution 1 Drop(s) Both EYES daily  aspirin enteric coated 81 milliGRAM(s) Oral daily  dextrose 5%. 1000 milliLiter(s) (50 mL/Hr) IV Continuous <Continuous>  dextrose 5%. 1000 milliLiter(s) (50 mL/Hr) IV Continuous <Continuous>  dextrose 50% Injectable 12.5 Gram(s) IV Push once  dextrose 50% Injectable 25 Gram(s) IV Push once  dextrose 50% Injectable 25 Gram(s) IV Push once  dextrose 50% Injectable 12.5 Gram(s) IV Push once  dextrose 50% Injectable 25 Gram(s) IV Push once  dextrose 50% Injectable 25 Gram(s) IV Push once  docusate sodium 100 milliGRAM(s) Oral three times a day  enoxaparin Injectable 40 milliGRAM(s) SubCutaneous daily  famotidine    Tablet 20 milliGRAM(s) Oral two times a day  gabapentin 200 milliGRAM(s) Oral two times a day  insulin lispro (HumaLOG) corrective regimen sliding scale   SubCutaneous three times a day before meals  insulin lispro (HumaLOG) corrective regimen sliding scale   SubCutaneous at bedtime  labetalol Injectable 10 milliGRAM(s) IV Push once  levothyroxine 25 MICROGram(s) Oral daily  losartan 100 milliGRAM(s) Oral daily  methocarbamol 750 milliGRAM(s) Oral every 8 hours  metoprolol tartrate 50 milliGRAM(s) Oral every 12 hours  potassium acid phosphate/sodium acid phosphate tablet (K-PHOS No. 2) 1 Tablet(s) Oral every 8 hours  potassium chloride    Tablet ER 20 milliEquivalent(s) Oral every 2 hours  QUEtiapine 50 milliGRAM(s) Oral at bedtime  senna 2 Tablet(s) Oral at bedtime  simvastatin 20 milliGRAM(s) Oral at bedtime  sodium chloride 2 Gram(s) Oral three times a day  sodium chloride 0.9% with potassium chloride 20 mEq/L 1000 milliLiter(s) (50 mL/Hr) IV Continuous <Continuous>  tamsulosin 0.4 milliGRAM(s) Oral daily    MEDICATIONS  (PRN):  bisacodyl Suppository 10 milliGRAM(s) Rectal daily PRN Constipation  dextrose Gel 1 Dose(s) Oral once PRN Blood Glucose LESS THAN 70 milliGRAM(s)/deciliter  dextrose Gel 1 Dose(s) Oral once PRN Blood Glucose LESS THAN 70 milliGRAM(s)/deciliter  glucagon  Injectable 1 milliGRAM(s) IntraMuscular once PRN Glucose LESS THAN 70 milligrams/deciliter  glucagon  Injectable 1 milliGRAM(s) IntraMuscular once PRN Glucose LESS THAN 70 milligrams/deciliter  HYDROmorphone  Injectable 0.5 milliGRAM(s) IV Push every 6 hours PRN Breakthrough pain  ondansetron Injectable 4 milliGRAM(s) IV Push every 6 hours PRN Nausea  simethicone 80 milliGRAM(s) Chew every 6 hours PRN Gas      LABS:                        10.8   8.1   )-----------( 282      ( 04 May 2018 06:48 )             31.7     05-04    131<L>  |  95<L>  |  10  ----------------------------<  190<H>  3.3<L>   |  21<L>  |  0.79    Ca    8.2<L>      04 May 2018 06:55  Phos  1.9     05-04  Mg     1.7     05-04            RADIOLOGY & ADDITIONAL STUDIES: Team [facility] PROGRESS NOTE      SUBJECTIVE: Pt seen at examined at bedside. AVSS. Reports emesis yesterday, currently not nauseous +Flatus. OOB and ambulating. Denies fever, CP, SOB, and nausea.         Vital Signs Last 24 Hrs  T(C): 36.9 (04 May 2018 08:00), Max: 37.4 (03 May 2018 21:38)  T(F): 98.4 (04 May 2018 08:00), Max: 99.4 (03 May 2018 21:38)  HR: 111 (04 May 2018 10:49) (106 - 138)  BP: 161/86 (04 May 2018 10:49) (140/84 - 183/95)  BP(mean): --  RR: 18 (04 May 2018 08:00) (18 - 18)  SpO2: 95% (04 May 2018 08:00) (93% - 97%)    Physical Exam  General: awake, alert, NAD    Pulm: respirations unlabored, no increased WOB  Abdomen: soft, mildly distended, NT  Extremities: Grossly symmetric    I&O's Summary    03 May 2018 07:01  -  04 May 2018 07:00  --------------------------------------------------------  IN: 120 mL / OUT: 1090 mL / NET: -970 mL    04 May 2018 07:01  -  04 May 2018 12:35  --------------------------------------------------------  IN: 480 mL / OUT: 800 mL / NET: -320 mL      I&O's Detail    03 May 2018 07:01  -  04 May 2018 07:00  --------------------------------------------------------  IN:    Oral Fluid: 120 mL  Total IN: 120 mL    OUT:    Accordian: 15 mL    Emesis: 125 mL    Indwelling Catheter - Urethral: 950 mL  Total OUT: 1090 mL    Total NET: -970 mL      04 May 2018 07:01  -  04 May 2018 12:35  --------------------------------------------------------  IN:    Oral Fluid: 480 mL  Total IN: 480 mL    OUT:    Indwelling Catheter - Urethral: 800 mL  Total OUT: 800 mL    Total NET: -320 mL          MEDICATIONS  (STANDING):  artificial  tears Solution 1 Drop(s) Both EYES daily  aspirin enteric coated 81 milliGRAM(s) Oral daily  dextrose 5%. 1000 milliLiter(s) (50 mL/Hr) IV Continuous <Continuous>  dextrose 5%. 1000 milliLiter(s) (50 mL/Hr) IV Continuous <Continuous>  dextrose 50% Injectable 12.5 Gram(s) IV Push once  dextrose 50% Injectable 25 Gram(s) IV Push once  dextrose 50% Injectable 25 Gram(s) IV Push once  dextrose 50% Injectable 12.5 Gram(s) IV Push once  dextrose 50% Injectable 25 Gram(s) IV Push once  dextrose 50% Injectable 25 Gram(s) IV Push once  docusate sodium 100 milliGRAM(s) Oral three times a day  enoxaparin Injectable 40 milliGRAM(s) SubCutaneous daily  famotidine    Tablet 20 milliGRAM(s) Oral two times a day  gabapentin 200 milliGRAM(s) Oral two times a day  insulin lispro (HumaLOG) corrective regimen sliding scale   SubCutaneous three times a day before meals  insulin lispro (HumaLOG) corrective regimen sliding scale   SubCutaneous at bedtime  labetalol Injectable 10 milliGRAM(s) IV Push once  levothyroxine 25 MICROGram(s) Oral daily  losartan 100 milliGRAM(s) Oral daily  methocarbamol 750 milliGRAM(s) Oral every 8 hours  metoprolol tartrate 50 milliGRAM(s) Oral every 12 hours  potassium acid phosphate/sodium acid phosphate tablet (K-PHOS No. 2) 1 Tablet(s) Oral every 8 hours  potassium chloride    Tablet ER 20 milliEquivalent(s) Oral every 2 hours  QUEtiapine 50 milliGRAM(s) Oral at bedtime  senna 2 Tablet(s) Oral at bedtime  simvastatin 20 milliGRAM(s) Oral at bedtime  sodium chloride 2 Gram(s) Oral three times a day  sodium chloride 0.9% with potassium chloride 20 mEq/L 1000 milliLiter(s) (50 mL/Hr) IV Continuous <Continuous>  tamsulosin 0.4 milliGRAM(s) Oral daily    MEDICATIONS  (PRN):  bisacodyl Suppository 10 milliGRAM(s) Rectal daily PRN Constipation  dextrose Gel 1 Dose(s) Oral once PRN Blood Glucose LESS THAN 70 milliGRAM(s)/deciliter  dextrose Gel 1 Dose(s) Oral once PRN Blood Glucose LESS THAN 70 milliGRAM(s)/deciliter  glucagon  Injectable 1 milliGRAM(s) IntraMuscular once PRN Glucose LESS THAN 70 milligrams/deciliter  glucagon  Injectable 1 milliGRAM(s) IntraMuscular once PRN Glucose LESS THAN 70 milligrams/deciliter  HYDROmorphone  Injectable 0.5 milliGRAM(s) IV Push every 6 hours PRN Breakthrough pain  ondansetron Injectable 4 milliGRAM(s) IV Push every 6 hours PRN Nausea  simethicone 80 milliGRAM(s) Chew every 6 hours PRN Gas      LABS:                        10.8   8.1   )-----------( 282      ( 04 May 2018 06:48 )             31.7     05-04    131<L>  |  95<L>  |  10  ----------------------------<  190<H>  3.3<L>   |  21<L>  |  0.79    Ca    8.2<L>      04 May 2018 06:55  Phos  1.9     05-04  Mg     1.7     05-04            RADIOLOGY & ADDITIONAL STUDIES:

## 2018-05-04 NOTE — DISCHARGE NOTE ADULT - PATIENT PORTAL LINK FT
You can access the [facility], offered by [facility], by registering with the following website: [email or web address]

## 2018-05-04 NOTE — DISCHARGE NOTE ADULT - PLAN OF CARE
Increase activity Follow up with Dr. Banegas after rehab-Neurosurgeon-Please call office to confirm appointment.   Follow up with PMD as outpatient   Follow up with cardiologist as outpatient Follow up with Dr. Banegas after rehab-Neurosurgeon-Please call office to confirm appointment.   Follow up with PMD Dr. Burr  as outpatient    Follow up with cardiologist as outpatient Follow up with Dr. Banegas after rehab-Neurosurgeon-Please call office to confirm appointment.   Follow up with PMD or  Dr. Burr  as outpatient    Follow up with cardiologist as outpatient  Please remove shoemaker on 5/5 AM to give trial of void.

## 2018-05-06 ENCOUNTER — EMERGENCY (EMERGENCY)
Facility: HOSPITAL | Age: 73
LOS: 1 days | Discharge: HOME | End: 2018-05-06
Attending: EMERGENCY MEDICINE | Admitting: EMERGENCY MEDICINE
Payer: MEDICARE

## 2018-05-06 VITALS
OXYGEN SATURATION: 100 % | TEMPERATURE: 98 F | SYSTOLIC BLOOD PRESSURE: 154 MMHG | RESPIRATION RATE: 16 BRPM | HEART RATE: 81 BPM | DIASTOLIC BLOOD PRESSURE: 85 MMHG

## 2018-05-06 VITALS
TEMPERATURE: 98 F | RESPIRATION RATE: 16 BRPM | SYSTOLIC BLOOD PRESSURE: 168 MMHG | HEART RATE: 77 BPM | DIASTOLIC BLOOD PRESSURE: 96 MMHG | OXYGEN SATURATION: 97 %

## 2018-05-06 DIAGNOSIS — Z98.89 OTHER SPECIFIED POSTPROCEDURAL STATES: Chronic | ICD-10-CM

## 2018-05-06 DIAGNOSIS — Z96.653 PRESENCE OF ARTIFICIAL KNEE JOINT, BILATERAL: Chronic | ICD-10-CM

## 2018-05-06 DIAGNOSIS — Z95.5 PRESENCE OF CORONARY ANGIOPLASTY IMPLANT AND GRAFT: Chronic | ICD-10-CM

## 2018-05-06 DIAGNOSIS — Z98.890 OTHER SPECIFIED POSTPROCEDURAL STATES: Chronic | ICD-10-CM

## 2018-05-06 LAB
ALBUMIN SERPL ELPH-MCNC: 3.3 G/DL — SIGNIFICANT CHANGE UP (ref 3.3–5)
ALP SERPL-CCNC: 47 U/L — SIGNIFICANT CHANGE UP (ref 40–120)
ALT FLD-CCNC: 15 U/L — SIGNIFICANT CHANGE UP (ref 10–45)
ANION GAP SERPL CALC-SCNC: 12 MMOL/L — SIGNIFICANT CHANGE UP (ref 5–17)
APPEARANCE UR: CLEAR — SIGNIFICANT CHANGE UP
AST SERPL-CCNC: 21 U/L — SIGNIFICANT CHANGE UP (ref 10–40)
BASOPHILS # BLD AUTO: 0 K/UL — SIGNIFICANT CHANGE UP (ref 0–0.2)
BASOPHILS NFR BLD AUTO: 0.1 % — SIGNIFICANT CHANGE UP (ref 0–2)
BILIRUB SERPL-MCNC: 0.3 MG/DL — SIGNIFICANT CHANGE UP (ref 0.2–1.2)
BILIRUB UR-MCNC: NEGATIVE — SIGNIFICANT CHANGE UP
BUN SERPL-MCNC: 11 MG/DL — SIGNIFICANT CHANGE UP (ref 7–23)
CALCIUM SERPL-MCNC: 8.6 MG/DL — SIGNIFICANT CHANGE UP (ref 8.4–10.5)
CHLORIDE SERPL-SCNC: 98 MMOL/L — SIGNIFICANT CHANGE UP (ref 96–108)
CO2 SERPL-SCNC: 25 MMOL/L — SIGNIFICANT CHANGE UP (ref 22–31)
COLOR SPEC: YELLOW — SIGNIFICANT CHANGE UP
CREAT SERPL-MCNC: 1.07 MG/DL — SIGNIFICANT CHANGE UP (ref 0.5–1.3)
DIFF PNL FLD: NEGATIVE — SIGNIFICANT CHANGE UP
EOSINOPHIL # BLD AUTO: 0.1 K/UL — SIGNIFICANT CHANGE UP (ref 0–0.5)
EOSINOPHIL NFR BLD AUTO: 1 % — SIGNIFICANT CHANGE UP (ref 0–6)
GAS PNL BLDV: SIGNIFICANT CHANGE UP
GLUCOSE SERPL-MCNC: 186 MG/DL — HIGH (ref 70–99)
GLUCOSE UR QL: 100 MG/DL
HCT VFR BLD CALC: 32.6 % — LOW (ref 39–50)
HGB BLD-MCNC: 11.4 G/DL — LOW (ref 13–17)
KETONES UR-MCNC: NEGATIVE — SIGNIFICANT CHANGE UP
LEUKOCYTE ESTERASE UR-ACNC: NEGATIVE — SIGNIFICANT CHANGE UP
LIDOCAIN IGE QN: 129 U/L — HIGH (ref 7–60)
LYMPHOCYTES # BLD AUTO: 1.1 K/UL — SIGNIFICANT CHANGE UP (ref 1–3.3)
LYMPHOCYTES # BLD AUTO: 15.8 % — SIGNIFICANT CHANGE UP (ref 13–44)
MCHC RBC-ENTMCNC: 34.4 PG — HIGH (ref 27–34)
MCHC RBC-ENTMCNC: 34.9 GM/DL — SIGNIFICANT CHANGE UP (ref 32–36)
MCV RBC AUTO: 98.6 FL — SIGNIFICANT CHANGE UP (ref 80–100)
MONOCYTES # BLD AUTO: 0.8 K/UL — SIGNIFICANT CHANGE UP (ref 0–0.9)
MONOCYTES NFR BLD AUTO: 12 % — SIGNIFICANT CHANGE UP (ref 2–14)
NEUTROPHILS # BLD AUTO: 5 K/UL — SIGNIFICANT CHANGE UP (ref 1.8–7.4)
NEUTROPHILS NFR BLD AUTO: 71.2 % — SIGNIFICANT CHANGE UP (ref 43–77)
NITRITE UR-MCNC: NEGATIVE — SIGNIFICANT CHANGE UP
PH UR: 6.5 — SIGNIFICANT CHANGE UP (ref 5–8)
PLATELET # BLD AUTO: 384 K/UL — SIGNIFICANT CHANGE UP (ref 150–400)
POTASSIUM SERPL-MCNC: 4.4 MMOL/L — SIGNIFICANT CHANGE UP (ref 3.5–5.3)
POTASSIUM SERPL-SCNC: 4.4 MMOL/L — SIGNIFICANT CHANGE UP (ref 3.5–5.3)
PROT SERPL-MCNC: 7.1 G/DL — SIGNIFICANT CHANGE UP (ref 6–8.3)
PROT UR-MCNC: 30 MG/DL
RBC # BLD: 3.31 M/UL — LOW (ref 4.2–5.8)
RBC # FLD: 12.5 % — SIGNIFICANT CHANGE UP (ref 10.3–14.5)
SODIUM SERPL-SCNC: 135 MMOL/L — SIGNIFICANT CHANGE UP (ref 135–145)
SP GR SPEC: 1.02 — SIGNIFICANT CHANGE UP (ref 1.01–1.02)
UROBILINOGEN FLD QL: 1 MG/DL
WBC # BLD: 7.1 K/UL — SIGNIFICANT CHANGE UP (ref 3.8–10.5)
WBC # FLD AUTO: 7.1 K/UL — SIGNIFICANT CHANGE UP (ref 3.8–10.5)

## 2018-05-06 PROCEDURE — 84295 ASSAY OF SERUM SODIUM: CPT

## 2018-05-06 PROCEDURE — 74177 CT ABD & PELVIS W/CONTRAST: CPT | Mod: 26

## 2018-05-06 PROCEDURE — 96374 THER/PROPH/DIAG INJ IV PUSH: CPT

## 2018-05-06 PROCEDURE — 81001 URINALYSIS AUTO W/SCOPE: CPT

## 2018-05-06 PROCEDURE — 82947 ASSAY GLUCOSE BLOOD QUANT: CPT

## 2018-05-06 PROCEDURE — 82435 ASSAY OF BLOOD CHLORIDE: CPT

## 2018-05-06 PROCEDURE — 82330 ASSAY OF CALCIUM: CPT

## 2018-05-06 PROCEDURE — 74177 CT ABD & PELVIS W/CONTRAST: CPT

## 2018-05-06 PROCEDURE — 80053 COMPREHEN METABOLIC PANEL: CPT

## 2018-05-06 PROCEDURE — 85014 HEMATOCRIT: CPT

## 2018-05-06 PROCEDURE — 85027 COMPLETE CBC AUTOMATED: CPT

## 2018-05-06 PROCEDURE — 83690 ASSAY OF LIPASE: CPT

## 2018-05-06 PROCEDURE — 84132 ASSAY OF SERUM POTASSIUM: CPT

## 2018-05-06 PROCEDURE — 83605 ASSAY OF LACTIC ACID: CPT

## 2018-05-06 PROCEDURE — 84100 ASSAY OF PHOSPHORUS: CPT

## 2018-05-06 PROCEDURE — 83735 ASSAY OF MAGNESIUM: CPT

## 2018-05-06 PROCEDURE — 87086 URINE CULTURE/COLONY COUNT: CPT

## 2018-05-06 PROCEDURE — 99284 EMERGENCY DEPT VISIT MOD MDM: CPT | Mod: 25

## 2018-05-06 PROCEDURE — 99284 EMERGENCY DEPT VISIT MOD MDM: CPT

## 2018-05-06 PROCEDURE — 96375 TX/PRO/DX INJ NEW DRUG ADDON: CPT

## 2018-05-06 PROCEDURE — 82803 BLOOD GASES ANY COMBINATION: CPT

## 2018-05-06 RX ORDER — ONDANSETRON 8 MG/1
4 TABLET, FILM COATED ORAL ONCE
Qty: 0 | Refills: 0 | Status: COMPLETED | OUTPATIENT
Start: 2018-05-06 | End: 2018-05-06

## 2018-05-06 RX ORDER — ACETAMINOPHEN 500 MG
1000 TABLET ORAL ONCE
Qty: 0 | Refills: 0 | Status: COMPLETED | OUTPATIENT
Start: 2018-05-06 | End: 2018-05-06

## 2018-05-06 RX ORDER — SODIUM CHLORIDE 9 MG/ML
1000 INJECTION INTRAMUSCULAR; INTRAVENOUS; SUBCUTANEOUS ONCE
Qty: 0 | Refills: 0 | Status: COMPLETED | OUTPATIENT
Start: 2018-05-06 | End: 2018-05-06

## 2018-05-06 RX ADMIN — SODIUM CHLORIDE 1000 MILLILITER(S): 9 INJECTION INTRAMUSCULAR; INTRAVENOUS; SUBCUTANEOUS at 17:51

## 2018-05-06 RX ADMIN — ONDANSETRON 4 MILLIGRAM(S): 8 TABLET, FILM COATED ORAL at 17:51

## 2018-05-06 RX ADMIN — Medication 400 MILLIGRAM(S): at 17:51

## 2018-05-06 NOTE — ED ADULT NURSE REASSESSMENT NOTE - NS ED NURSE REASSESS COMMENT FT1
Received report from TREY Tejeda. Pt @ CT scan at this time. 19:05. Received report from TREY Tejeda. Pt @ CT scan at this time.

## 2018-05-06 NOTE — ED PROVIDER NOTE - MUSCULOSKELETAL, MLM
+ healing vertical midline incision with NO surrounding erythema, at Spine appears normal, range of motion is not limited, no muscle or joint tenderness + healing vertical midline incision with NO surrounding erythema/warmth, no drainage noted. Spine appears normal, range of motion is not limited, no muscle or joint tenderness

## 2018-05-06 NOTE — ED PROVIDER NOTE - CARE PLAN
Principal Discharge DX:	Abdominal distension  Assessment and plan of treatment:	1. Rest. Stay well hydrated. Advance diet as tolerated and eat a bland diet.   2. Follow up with your primary care provider in 1-2 days.  3. Return to ER for fever, worsening abdominal pain, persistent vomiting, persistent or bloody diarrhea, inability to tolerate food/fluid, or any other concerning symptoms.  Secondary Diagnosis:	Abdominal pain

## 2018-05-06 NOTE — ED PROVIDER NOTE - OBJECTIVE STATEMENT
73yo M with PMH DM, HTN, CAD s/p 7 stents, spinal stenosis and s/p L-spine fusion and excision of synovial cyst 4/30/18 with post op course c/b constipation, d/c to rehab 4/4/18 BIBEMS from rehab presenting with worsening abd pain over last 3-4 days. Pt reports severe LLQ abd pain, stabbing and constant, radiating to entire abdomen with associated nausea and vomiting (1 nonbloody episode last night) and 2 episodes of nonbloody diarrhea today. Pt reports abd pain and distension began while still in hospital post op, however is now worsening. Tolerated some fluid and crackers today. Denies fever/chills, CP, SOB, back pain, dysuria, hematuria.

## 2018-05-06 NOTE — ED PROVIDER NOTE - ATTENDING CONTRIBUTION TO CARE
Pt s/p back surgery with post-op colonic ileus requiring decompression and meds presents with loose stool and vomiting assoc with distended painful abdomen.  Distended, mildly td on exam, appears well otherwise.

## 2018-05-06 NOTE — ED ADULT NURSE NOTE - OBJECTIVE STATEMENT
Pt biba from [facility] for c/o abdominal pain associated with n/v/d.  abdomen is distended and pt's eyes appear yellow in color.  pt has hx of sbo.  pt is awake, alert and responsive to all stimuli.  no sob or respiratory distress noted at this time.  pt worked up and is awaiting ct scan, once radiology dept is ready to receive.  will continue to monitor.

## 2018-05-06 NOTE — ED PROVIDER NOTE - PLAN OF CARE
1. Rest. Stay well hydrated. Advance diet as tolerated and eat a bland diet.   2. Follow up with your primary care provider in 1-2 days.  3. Return to ER for fever, worsening abdominal pain, persistent vomiting, persistent or bloody diarrhea, inability to tolerate food/fluid, or any other concerning symptoms.

## 2018-05-06 NOTE — ED PROVIDER NOTE - PROGRESS NOTE DETAILS
Dr. Dunn Note: pt with watery stool in ED, still distended, nontender on exam, awaiting ct report, will plan to consult surgeon. Pt with small loose BM in ED, feeling well but still with mild abd discomfort. CT non-actionable.  Pt tolerating PO. Stable for d/c back to rehab. - Sherrie Booker PA-C Pt with small loose BM in ED, feeling well but still with mild abd discomfort. CT non-actionable. Patient afebrile, WBC normal, lactate normal. Pt tolerating PO. Stable for d/c back to rehab. Educated patient on advancing diet slowly as tolerated. Strict return precautions given to patient and family. - Sherrie Booker PA-C Pt with small loose BM in ED, feeling well but still with mild abd discomfort. CT non-actionable. Patient afebrile, WBC normal, lactate normal. Pt tolerating PO. Stable for d/c back to rehab. Educated patient on advancing diet slowly as tolerated. Strict return precautions given to patient and family. D/w Dr. Dunn. - Sherrie Booker, PA-C Dr. Dunn Note: tolerated PO, feels better, ambulette here for transport back to [place].

## 2018-05-06 NOTE — ED ADULT NURSE REASSESSMENT NOTE - NS ED NURSE REASSESS COMMENT FT1
Pt back from CT scan at this time. Pt resting comfortably in stretcher. NAD. VSS. Pt denies headache, dizziness, chest pain, palpitations, cough, SOB, urinary symptoms, fevers, chills, weakness at this time. Pt awaiting CT results. Will continue to reassess.

## 2018-05-07 LAB
CULTURE RESULTS: NO GROWTH — SIGNIFICANT CHANGE UP
SPECIMEN SOURCE: SIGNIFICANT CHANGE UP

## 2018-05-08 DIAGNOSIS — R69 ILLNESS, UNSPECIFIED: ICD-10-CM

## 2019-11-13 ENCOUNTER — OUTPATIENT (OUTPATIENT)
Dept: OUTPATIENT SERVICES | Facility: HOSPITAL | Age: 74
LOS: 1 days | End: 2019-11-13
Payer: MEDICARE

## 2019-11-13 VITALS
SYSTOLIC BLOOD PRESSURE: 169 MMHG | OXYGEN SATURATION: 98 % | DIASTOLIC BLOOD PRESSURE: 73 MMHG | WEIGHT: 164.91 LBS | HEART RATE: 58 BPM | RESPIRATION RATE: 16 BRPM | HEIGHT: 65 IN | TEMPERATURE: 98 F

## 2019-11-13 DIAGNOSIS — Z96.653 PRESENCE OF ARTIFICIAL KNEE JOINT, BILATERAL: Chronic | ICD-10-CM

## 2019-11-13 DIAGNOSIS — Z98.89 OTHER SPECIFIED POSTPROCEDURAL STATES: Chronic | ICD-10-CM

## 2019-11-13 DIAGNOSIS — R94.39 ABNORMAL RESULT OF OTHER CARDIOVASCULAR FUNCTION STUDY: ICD-10-CM

## 2019-11-13 DIAGNOSIS — Z98.890 OTHER SPECIFIED POSTPROCEDURAL STATES: Chronic | ICD-10-CM

## 2019-11-13 DIAGNOSIS — Z95.5 PRESENCE OF CORONARY ANGIOPLASTY IMPLANT AND GRAFT: Chronic | ICD-10-CM

## 2019-11-13 DIAGNOSIS — Z98.1 ARTHRODESIS STATUS: Chronic | ICD-10-CM

## 2019-11-13 PROBLEM — M54.16 RADICULOPATHY, LUMBAR REGION: Chronic | Status: ACTIVE | Noted: 2018-04-09

## 2019-11-13 LAB
ANION GAP SERPL CALC-SCNC: 14 MMOL/L — SIGNIFICANT CHANGE UP (ref 5–17)
BUN SERPL-MCNC: 12 MG/DL — SIGNIFICANT CHANGE UP (ref 7–23)
CALCIUM SERPL-MCNC: 9.4 MG/DL — SIGNIFICANT CHANGE UP (ref 8.4–10.5)
CHLORIDE SERPL-SCNC: 93 MMOL/L — LOW (ref 96–108)
CO2 SERPL-SCNC: 25 MMOL/L — SIGNIFICANT CHANGE UP (ref 22–31)
CREAT SERPL-MCNC: 1.05 MG/DL — SIGNIFICANT CHANGE UP (ref 0.5–1.3)
GLUCOSE BLDC GLUCOMTR-MCNC: 108 MG/DL — HIGH (ref 70–99)
GLUCOSE SERPL-MCNC: 113 MG/DL — HIGH (ref 70–99)
HCT VFR BLD CALC: 33.4 % — LOW (ref 39–50)
HGB BLD-MCNC: 11.7 G/DL — LOW (ref 13–17)
MCHC RBC-ENTMCNC: 32.7 PG — SIGNIFICANT CHANGE UP (ref 27–34)
MCHC RBC-ENTMCNC: 35 GM/DL — SIGNIFICANT CHANGE UP (ref 32–36)
MCV RBC AUTO: 93.3 FL — SIGNIFICANT CHANGE UP (ref 80–100)
NRBC # BLD: 0 /100 WBCS — SIGNIFICANT CHANGE UP (ref 0–0)
PLATELET # BLD AUTO: 251 K/UL — SIGNIFICANT CHANGE UP (ref 150–400)
POTASSIUM SERPL-MCNC: 4.2 MMOL/L — SIGNIFICANT CHANGE UP (ref 3.5–5.3)
POTASSIUM SERPL-SCNC: 4.2 MMOL/L — SIGNIFICANT CHANGE UP (ref 3.5–5.3)
RBC # BLD: 3.58 M/UL — LOW (ref 4.2–5.8)
RBC # FLD: 13.4 % — SIGNIFICANT CHANGE UP (ref 10.3–14.5)
SODIUM SERPL-SCNC: 132 MMOL/L — LOW (ref 135–145)
WBC # BLD: 5.7 K/UL — SIGNIFICANT CHANGE UP (ref 3.8–10.5)
WBC # FLD AUTO: 5.7 K/UL — SIGNIFICANT CHANGE UP (ref 3.8–10.5)

## 2019-11-13 PROCEDURE — C1894: CPT

## 2019-11-13 PROCEDURE — 85027 COMPLETE CBC AUTOMATED: CPT

## 2019-11-13 PROCEDURE — 99152 MOD SED SAME PHYS/QHP 5/>YRS: CPT

## 2019-11-13 PROCEDURE — C1769: CPT

## 2019-11-13 PROCEDURE — 93010 ELECTROCARDIOGRAM REPORT: CPT

## 2019-11-13 PROCEDURE — 80048 BASIC METABOLIC PNL TOTAL CA: CPT

## 2019-11-13 PROCEDURE — C1887: CPT

## 2019-11-13 PROCEDURE — 93458 L HRT ARTERY/VENTRICLE ANGIO: CPT | Mod: 26

## 2019-11-13 PROCEDURE — 82962 GLUCOSE BLOOD TEST: CPT

## 2019-11-13 PROCEDURE — 93458 L HRT ARTERY/VENTRICLE ANGIO: CPT

## 2019-11-13 PROCEDURE — 93005 ELECTROCARDIOGRAM TRACING: CPT

## 2019-11-13 RX ORDER — SODIUM,POTASSIUM PHOSPHATES 278-250MG
1 POWDER IN PACKET (EA) ORAL
Qty: 0 | Refills: 0 | DISCHARGE

## 2019-11-13 RX ORDER — ASPIRIN/CALCIUM CARB/MAGNESIUM 324 MG
81 TABLET ORAL DAILY
Refills: 0 | Status: DISCONTINUED | OUTPATIENT
Start: 2019-11-13 | End: 2019-11-28

## 2019-11-13 RX ORDER — PIOGLITAZONE HYDROCHLORIDE 15 MG/1
1 TABLET ORAL
Qty: 0 | Refills: 0 | DISCHARGE

## 2019-11-13 RX ORDER — SODIUM CHLORIDE 9 MG/ML
1 INJECTION INTRAMUSCULAR; INTRAVENOUS; SUBCUTANEOUS
Qty: 0 | Refills: 0 | DISCHARGE

## 2019-11-13 NOTE — H&P CARDIOLOGY - HISTORY OF PRESENT ILLNESS
Narrative: This is a 73y/o male with no implantable devices noted PMHX of HTN, Depression, Thyroid dysfunction, Hyperlipidemia, BPH ,DM-II on insulin compliant with meds uncomplicated does not recall last Hgb AIC FS today 108 ,  known CAD, prior cardiac catheterizations with PTCA/stents pt recently c/o midsternal chest pain on and off for last 2 months with and without rest . Pt had Abnormal Stress test 2 months ago .  Pt now referred for cardiac catheterization and possible PTCA with Dr. Patrick today .   Pt Cardiologist Dr. QUIQUE Trotter . Currently CP free no sob no palpitations no lightheadedness or dizziness noted.     Symptoms:        Angina (Class): Class III        Ischemic Symptoms:   Assessment of LVEF:       EF:        Assessed by:        Date:     Prior Cardiac Interventions:< from: Cardiac Cath Lab - Adult (12.05.14 @ 13:51) >  VENTRICLES: No LV gram was performed; however, a recent echocardiogram  demonstrated an EF of 45 %.  CORONARY VESSELS: Dominance was not assessed.  LM:   --  LM: Normal.  LAD:   --  Proximal LAD: There was a 80 % stenosis. The lesion was heavily  calcified. There was JAYA grade 3 flow through the vessel (brisk flow) and  a large vascular territory distal to the lesion.  CX:   --  Circumflex: This vessel was not injected, but was visualized  during a prior cardiac catheterization.  RCA:   --  RCA: This vessel was not injected, but was visualized during a  prior cardiac catheterization.  COMPLICATIONS: No complications occurred during the cath lab visit.  DIAGNOSTIC RECOMMENDATIONS:  1. Successful PCI and rotational atherectomy to the pLAD lesion with a  1.5mm lexie and 3.0mm Promus Premier GABRIEL (post-dilated to 3.5mm)  2. Aspirin and clopidogrel for life  INTERVENTIONAL RECOMMENDATIONS:  1. Successful PCI and rotational atherectomy to the pLAD lesion with a  1.5mm lexie and 3.0mm Promus Premier GABRIEL (post-dilated to 3.5mm)  2. Aspirin and clopidogrel for life  Prepared and signed by  Luana Byrd M.D.  Signed 12/08/2014 17:33:20    < end of copied text >  Noninvasive Testing:   Stress Test: Date:        Protocol:        Duration of Exercise:        Symptoms:        EKG Changes:        DTS:        Myocardial Imaging:        Risk Assessment:     Echo:     Antianginal Therapies:        Beta Blockers:  Lopressor        Calcium Channel Blockers:        Long Acting Nitrates:        Ranexa: 500mg po bid     Associated Risk Factors:        Cerebrovascular Disease: N/A       Chronic Lung Disease: N/A       Peripheral Arterial Disease: N/A       Chronic Kidney Disease (if yes, what is GFR): N/A       Uncontrolled Diabetes (if yes, what is HgbA1C or FBS): N/A       Poorly Controlled Hypertension (if yes, what is SBP): N/A       Morbid Obesity (if yes, what is BMI): N/A       History of Recent Ventricular Arrhythmia: N/A       Inability to Ambulate Safely: N/A       Need for Therapeutic Anticoagulation: N/A       Antiplatelet or Contrast Allergy: N/A  Unstable angina CCS IV  Antianginal meds: Ranexa and Metoprolol Narrative: This is a 75y/o male with no implantable devices noted PMHX of HTN, Depression, Thyroid dysfunction, Hyperlipidemia, BPH ,DM-II on insulin compliant with meds uncomplicated does not recall last Hgb AIC FS today 108 ,  known CAD, prior cardiac catheterizations with PTCA/stents pt recently c/o midsternal chest pain on and off for last 2 months with and without rest . Pt had Abnormal Stress test 2 months ago no report noted.   Pt now referred for cardiac catheterization and possible PTCA with Dr. Patrick today .   Pt Cardiologist Dr. QUIQUE Trotter . Currently CP free no sob no palpitations no lightheadedness or dizziness noted.     Symptoms:        Angina (Class): Class III        Ischemic Symptoms:   Assessment of LVEF:       EF:        Assessed by:        Date:     Prior Cardiac Interventions:< from: Cardiac Cath Lab - Adult (12.05.14 @ 13:51) >  VENTRICLES: No LV gram was performed; however, a recent echocardiogram  demonstrated an EF of 45 %.  CORONARY VESSELS: Dominance was not assessed.  LM:   --  LM: Normal.  LAD:   --  Proximal LAD: There was a 80 % stenosis. The lesion was heavily  calcified. There was JAYA grade 3 flow through the vessel (brisk flow) and  a large vascular territory distal to the lesion.  CX:   --  Circumflex: This vessel was not injected, but was visualized  during a prior cardiac catheterization.  RCA:   --  RCA: This vessel was not injected, but was visualized during a  prior cardiac catheterization.  COMPLICATIONS: No complications occurred during the cath lab visit.  DIAGNOSTIC RECOMMENDATIONS:  1. Successful PCI and rotational atherectomy to the pLAD lesion with a  1.5mm lexie and 3.0mm Promus Premier GABRIEL (post-dilated to 3.5mm)  2. Aspirin and clopidogrel for life  INTERVENTIONAL RECOMMENDATIONS:  1. Successful PCI and rotational atherectomy to the pLAD lesion with a  1.5mm lexie and 3.0mm Promus Premier GABRIEL (post-dilated to 3.5mm)  2. Aspirin and clopidogrel for life  Prepared and signed by  Luana Byrd M.D.  Signed 12/08/2014 17:33:20    < end of copied text >  Noninvasive Testing:   Stress Test: Date:        Protocol:        Duration of Exercise:        Symptoms:        EKG Changes:        DTS:        Myocardial Imaging:        Risk Assessment:     Echo:     Antianginal Therapies:        Beta Blockers:  Lopressor        Calcium Channel Blockers:        Long Acting Nitrates:        Ranexa: 500mg po bid     Associated Risk Factors:        Cerebrovascular Disease: N/A       Chronic Lung Disease: N/A       Peripheral Arterial Disease: N/A       Chronic Kidney Disease (if yes, what is GFR): N/A       Uncontrolled Diabetes (if yes, what is HgbA1C or FBS): N/A       Poorly Controlled Hypertension (if yes, what is SBP): N/A       Morbid Obesity (if yes, what is BMI): N/A       History of Recent Ventricular Arrhythmia: N/A       Inability to Ambulate Safely: N/A       Need for Therapeutic Anticoagulation: N/A       Antiplatelet or Contrast Allergy: N/A  Unstable angina CCS IV  Antianginal meds: Ranexa and Metoprolol

## 2019-11-13 NOTE — H&P CARDIOLOGY - PMH
Benign hypertrophy of prostate    CAD (coronary artery disease)    Depression    Diabetes    Hyperlipidemia    Hypertension    Stented coronary artery    Thyroid disease Benign hypertrophy of prostate    CAD (coronary artery disease)  cardiac stents x7  Depression    Diabetes  type 2  Diverticulitis    Hyperlipidemia    Hypertension    Lumbar disc disorder    Lumbar radiculopathy    MI (myocardial infarction)  12/2014  OA (osteoarthritis)    Stented coronary artery    Thyroid disease

## 2019-11-13 NOTE — H&P CARDIOLOGY - PSH
S/P knee replacement, bilateral    S/P shoulder surgery  s/p R rotator cuff surgery H/O heart artery stent    History of back surgery  lumbar  History of cardiac catheterization  2010 2 stents, 2012 3 stents, 12/2014 2 coronary artery stents  S/P knee replacement, bilateral    S/P laminectomy with spinal fusion  x 2  S/P shoulder surgery  s/p R rotator cuff surgery
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
## 2021-12-27 ENCOUNTER — INPATIENT (INPATIENT)
Facility: HOSPITAL | Age: 76
LOS: 9 days | Discharge: HOME | DRG: 177 | End: 2022-01-06
Attending: INTERNAL MEDICINE | Admitting: INTERNAL MEDICINE
Payer: MEDICARE

## 2021-12-27 VITALS
SYSTOLIC BLOOD PRESSURE: 176 MMHG | RESPIRATION RATE: 18 BRPM | WEIGHT: 164.91 LBS | HEIGHT: 65 IN | OXYGEN SATURATION: 97 % | TEMPERATURE: 99 F | DIASTOLIC BLOOD PRESSURE: 80 MMHG | HEART RATE: 80 BPM

## 2021-12-27 DIAGNOSIS — Z98.89 OTHER SPECIFIED POSTPROCEDURAL STATES: Chronic | ICD-10-CM

## 2021-12-27 DIAGNOSIS — Z98.890 OTHER SPECIFIED POSTPROCEDURAL STATES: Chronic | ICD-10-CM

## 2021-12-27 DIAGNOSIS — E11.9 TYPE 2 DIABETES MELLITUS WITHOUT COMPLICATIONS: ICD-10-CM

## 2021-12-27 DIAGNOSIS — Z98.1 ARTHRODESIS STATUS: Chronic | ICD-10-CM

## 2021-12-27 DIAGNOSIS — E87.1 HYPO-OSMOLALITY AND HYPONATREMIA: ICD-10-CM

## 2021-12-27 DIAGNOSIS — U07.1 COVID-19: ICD-10-CM

## 2021-12-27 DIAGNOSIS — Z29.9 ENCOUNTER FOR PROPHYLACTIC MEASURES, UNSPECIFIED: ICD-10-CM

## 2021-12-27 DIAGNOSIS — Z96.653 PRESENCE OF ARTIFICIAL KNEE JOINT, BILATERAL: Chronic | ICD-10-CM

## 2021-12-27 DIAGNOSIS — I25.10 ATHEROSCLEROTIC HEART DISEASE OF NATIVE CORONARY ARTERY WITHOUT ANGINA PECTORIS: ICD-10-CM

## 2021-12-27 DIAGNOSIS — Z95.5 PRESENCE OF CORONARY ANGIOPLASTY IMPLANT AND GRAFT: Chronic | ICD-10-CM

## 2021-12-27 LAB
ALBUMIN SERPL ELPH-MCNC: 4 G/DL — SIGNIFICANT CHANGE UP (ref 3.3–5)
ALP SERPL-CCNC: 64 U/L — SIGNIFICANT CHANGE UP (ref 40–120)
ALT FLD-CCNC: 20 U/L — SIGNIFICANT CHANGE UP (ref 10–45)
ANION GAP SERPL CALC-SCNC: 10 MMOL/L — SIGNIFICANT CHANGE UP (ref 5–17)
APPEARANCE UR: CLEAR — SIGNIFICANT CHANGE UP
AST SERPL-CCNC: 20 U/L — SIGNIFICANT CHANGE UP (ref 10–40)
BACTERIA # UR AUTO: NEGATIVE — SIGNIFICANT CHANGE UP
BASOPHILS # BLD AUTO: 0 K/UL — SIGNIFICANT CHANGE UP (ref 0–0.2)
BASOPHILS NFR BLD AUTO: 0 % — SIGNIFICANT CHANGE UP (ref 0–2)
BILIRUB SERPL-MCNC: 0.3 MG/DL — SIGNIFICANT CHANGE UP (ref 0.2–1.2)
BILIRUB UR-MCNC: NEGATIVE — SIGNIFICANT CHANGE UP
BUN SERPL-MCNC: 16 MG/DL — SIGNIFICANT CHANGE UP (ref 7–23)
CALCIUM SERPL-MCNC: 9.2 MG/DL — SIGNIFICANT CHANGE UP (ref 8.4–10.5)
CHLORIDE SERPL-SCNC: 89 MMOL/L — LOW (ref 96–108)
CO2 SERPL-SCNC: 21 MMOL/L — LOW (ref 22–31)
COLOR SPEC: SIGNIFICANT CHANGE UP
CREAT SERPL-MCNC: 1.28 MG/DL — SIGNIFICANT CHANGE UP (ref 0.5–1.3)
DIFF PNL FLD: NEGATIVE — SIGNIFICANT CHANGE UP
EOSINOPHIL # BLD AUTO: 0.11 K/UL — SIGNIFICANT CHANGE UP (ref 0–0.5)
EOSINOPHIL NFR BLD AUTO: 1.7 % — SIGNIFICANT CHANGE UP (ref 0–6)
EPI CELLS # UR: 0 /HPF — SIGNIFICANT CHANGE UP
GLUCOSE SERPL-MCNC: 168 MG/DL — HIGH (ref 70–99)
GLUCOSE UR QL: ABNORMAL
HCT VFR BLD CALC: 31.3 % — LOW (ref 39–50)
HGB BLD-MCNC: 10.9 G/DL — LOW (ref 13–17)
KETONES UR-MCNC: NEGATIVE — SIGNIFICANT CHANGE UP
LEUKOCYTE ESTERASE UR-ACNC: NEGATIVE — SIGNIFICANT CHANGE UP
LYMPHOCYTES # BLD AUTO: 0.27 K/UL — LOW (ref 1–3.3)
LYMPHOCYTES # BLD AUTO: 4.3 % — LOW (ref 13–44)
MAGNESIUM SERPL-MCNC: 1.5 MG/DL — LOW (ref 1.6–2.6)
MCHC RBC-ENTMCNC: 33.3 PG — SIGNIFICANT CHANGE UP (ref 27–34)
MCHC RBC-ENTMCNC: 34.8 GM/DL — SIGNIFICANT CHANGE UP (ref 32–36)
MCV RBC AUTO: 95.7 FL — SIGNIFICANT CHANGE UP (ref 80–100)
MONOCYTES # BLD AUTO: 0.9 K/UL — SIGNIFICANT CHANGE UP (ref 0–0.9)
MONOCYTES NFR BLD AUTO: 14.5 % — HIGH (ref 2–14)
NEUTROPHILS # BLD AUTO: 4.93 K/UL — SIGNIFICANT CHANGE UP (ref 1.8–7.4)
NEUTROPHILS NFR BLD AUTO: 78.6 % — HIGH (ref 43–77)
NITRITE UR-MCNC: NEGATIVE — SIGNIFICANT CHANGE UP
OSMOLALITY SERPL: 258 MOSMOL/KG — LOW (ref 280–301)
PH UR: 6.5 — SIGNIFICANT CHANGE UP (ref 5–8)
PLATELET # BLD AUTO: 261 K/UL — SIGNIFICANT CHANGE UP (ref 150–400)
POTASSIUM SERPL-MCNC: 5.5 MMOL/L — HIGH (ref 3.5–5.3)
POTASSIUM SERPL-SCNC: 5.5 MMOL/L — HIGH (ref 3.5–5.3)
PROT SERPL-MCNC: 7.3 G/DL — SIGNIFICANT CHANGE UP (ref 6–8.3)
PROT UR-MCNC: ABNORMAL
RBC # BLD: 3.27 M/UL — LOW (ref 4.2–5.8)
RBC # FLD: 13.3 % — SIGNIFICANT CHANGE UP (ref 10.3–14.5)
RBC CASTS # UR COMP ASSIST: 1 /HPF — SIGNIFICANT CHANGE UP (ref 0–4)
SARS-COV-2 RNA SPEC QL NAA+PROBE: DETECTED
SODIUM SERPL-SCNC: 120 MMOL/L — CRITICAL LOW (ref 135–145)
SP GR SPEC: 1.01 — SIGNIFICANT CHANGE UP (ref 1.01–1.02)
UROBILINOGEN FLD QL: NEGATIVE — SIGNIFICANT CHANGE UP
WBC # BLD: 6.2 K/UL — SIGNIFICANT CHANGE UP (ref 3.8–10.5)
WBC # FLD AUTO: 6.2 K/UL — SIGNIFICANT CHANGE UP (ref 3.8–10.5)
WBC UR QL: 1 /HPF — SIGNIFICANT CHANGE UP (ref 0–5)

## 2021-12-27 PROCEDURE — 70450 CT HEAD/BRAIN W/O DYE: CPT | Mod: 26

## 2021-12-27 PROCEDURE — 99223 1ST HOSP IP/OBS HIGH 75: CPT

## 2021-12-27 PROCEDURE — 93010 ELECTROCARDIOGRAM REPORT: CPT

## 2021-12-27 PROCEDURE — 71045 X-RAY EXAM CHEST 1 VIEW: CPT | Mod: 26

## 2021-12-27 PROCEDURE — 99285 EMERGENCY DEPT VISIT HI MDM: CPT

## 2021-12-27 RX ORDER — INSULIN LISPRO 100/ML
VIAL (ML) SUBCUTANEOUS
Refills: 0 | Status: DISCONTINUED | OUTPATIENT
Start: 2021-12-27 | End: 2022-01-05

## 2021-12-27 RX ORDER — METOPROLOL TARTRATE 50 MG
100 TABLET ORAL
Refills: 0 | Status: DISCONTINUED | OUTPATIENT
Start: 2021-12-27 | End: 2022-01-06

## 2021-12-27 RX ORDER — DEXTROSE 50 % IN WATER 50 %
15 SYRINGE (ML) INTRAVENOUS ONCE
Refills: 0 | Status: DISCONTINUED | OUTPATIENT
Start: 2021-12-27 | End: 2022-01-06

## 2021-12-27 RX ORDER — DILTIAZEM HCL 120 MG
1 CAPSULE, EXT RELEASE 24 HR ORAL
Qty: 0 | Refills: 0 | DISCHARGE

## 2021-12-27 RX ORDER — ENOXAPARIN SODIUM 100 MG/ML
40 INJECTION SUBCUTANEOUS DAILY
Refills: 0 | Status: DISCONTINUED | OUTPATIENT
Start: 2021-12-27 | End: 2021-12-31

## 2021-12-27 RX ORDER — SODIUM CHLORIDE 9 MG/ML
250 INJECTION INTRAMUSCULAR; INTRAVENOUS; SUBCUTANEOUS ONCE
Refills: 0 | Status: COMPLETED | OUTPATIENT
Start: 2021-12-27 | End: 2021-12-27

## 2021-12-27 RX ORDER — ASPIRIN/CALCIUM CARB/MAGNESIUM 324 MG
81 TABLET ORAL DAILY
Refills: 0 | Status: DISCONTINUED | OUTPATIENT
Start: 2021-12-27 | End: 2022-01-02

## 2021-12-27 RX ORDER — LEVOTHYROXINE SODIUM 125 MCG
50 TABLET ORAL DAILY
Refills: 0 | Status: DISCONTINUED | OUTPATIENT
Start: 2021-12-27 | End: 2021-12-28

## 2021-12-27 RX ORDER — RANOLAZINE 500 MG/1
1 TABLET, FILM COATED, EXTENDED RELEASE ORAL
Qty: 0 | Refills: 0 | DISCHARGE

## 2021-12-27 RX ORDER — METOPROLOL TARTRATE 50 MG
100 TABLET ORAL
Refills: 0 | Status: DISCONTINUED | OUTPATIENT
Start: 2021-12-27 | End: 2021-12-27

## 2021-12-27 RX ORDER — SITAGLIPTIN AND METFORMIN HYDROCHLORIDE 500; 50 MG/1; MG/1
1 TABLET, FILM COATED ORAL
Qty: 0 | Refills: 0 | DISCHARGE

## 2021-12-27 RX ORDER — GLUCAGON INJECTION, SOLUTION 0.5 MG/.1ML
1 INJECTION, SOLUTION SUBCUTANEOUS ONCE
Refills: 0 | Status: DISCONTINUED | OUTPATIENT
Start: 2021-12-27 | End: 2022-01-06

## 2021-12-27 RX ORDER — FERROUS GLUCONATE 100 %
1 POWDER (GRAM) MISCELLANEOUS
Qty: 0 | Refills: 0 | DISCHARGE

## 2021-12-27 RX ORDER — DEXTROSE 50 % IN WATER 50 %
25 SYRINGE (ML) INTRAVENOUS ONCE
Refills: 0 | Status: DISCONTINUED | OUTPATIENT
Start: 2021-12-27 | End: 2022-01-06

## 2021-12-27 RX ORDER — REMDESIVIR 5 MG/ML
INJECTION INTRAVENOUS
Refills: 0 | Status: DISCONTINUED | OUTPATIENT
Start: 2021-12-27 | End: 2021-12-28

## 2021-12-27 RX ORDER — INSULIN LISPRO 100/ML
VIAL (ML) SUBCUTANEOUS AT BEDTIME
Refills: 0 | Status: DISCONTINUED | OUTPATIENT
Start: 2021-12-27 | End: 2022-01-06

## 2021-12-27 RX ORDER — REMDESIVIR 5 MG/ML
200 INJECTION INTRAVENOUS EVERY 24 HOURS
Refills: 0 | Status: DISCONTINUED | OUTPATIENT
Start: 2021-12-27 | End: 2021-12-28

## 2021-12-27 RX ORDER — DEXTROSE 50 % IN WATER 50 %
12.5 SYRINGE (ML) INTRAVENOUS ONCE
Refills: 0 | Status: DISCONTINUED | OUTPATIENT
Start: 2021-12-27 | End: 2022-01-06

## 2021-12-27 RX ORDER — ACETAMINOPHEN 500 MG
650 TABLET ORAL EVERY 4 HOURS
Refills: 0 | Status: DISCONTINUED | OUTPATIENT
Start: 2021-12-27 | End: 2022-01-06

## 2021-12-27 RX ORDER — SODIUM CHLORIDE 9 MG/ML
1000 INJECTION, SOLUTION INTRAVENOUS
Refills: 0 | Status: DISCONTINUED | OUTPATIENT
Start: 2021-12-27 | End: 2022-01-06

## 2021-12-27 RX ORDER — MAGNESIUM SULFATE 500 MG/ML
1 VIAL (ML) INJECTION ONCE
Refills: 0 | Status: COMPLETED | OUTPATIENT
Start: 2021-12-27 | End: 2021-12-28

## 2021-12-27 RX ORDER — INSULIN DETEMIR 100/ML (3)
26 INSULIN PEN (ML) SUBCUTANEOUS
Qty: 0 | Refills: 0 | DISCHARGE

## 2021-12-27 RX ORDER — METOPROLOL TARTRATE 50 MG
1 TABLET ORAL
Qty: 0 | Refills: 0 | DISCHARGE

## 2021-12-27 RX ADMIN — SODIUM CHLORIDE 250 MILLILITER(S): 9 INJECTION INTRAMUSCULAR; INTRAVENOUS; SUBCUTANEOUS at 18:57

## 2021-12-27 NOTE — H&P ADULT - NSHPADDITIONALINFOADULT_GEN_ALL_CORE
NIGHT HOSPITALIST:   Patient/ son aware of course and agree with plan/care as above.   Given patient's comorbidities, patient's long term prognosis is guarded.  Emotional support provided to patient/ son.  Care reviewed with covering NP/PA for endorsement to Dr. Jean.    Jamar Guillaume MD  [phone]  Available on [employer] Teams NIGHT HOSPITALIST:   Patient/ son aware of course and agree with plan/care as above.   Given patient's comorbidities, patient's long term prognosis is guarded.  Emotional support provided to patient/ son.  Care reviewed with covering NP/PA, Antelmo,  for endorsement to Dr. Jean.    Jamar Guillaume MD  [phone]  Available on [employer] Teams

## 2021-12-27 NOTE — H&P ADULT - NSHPLABSRESULTS_GEN_ALL_CORE
WBC 6.2    78N  0.9 bands.  Hgb 10.9    Platelets of 261K.    Random glucose of 168    HCO3 21  K+ 5.5  Cr 1.2    Mg++ 1.5>>supplemented.    COVID-19 PCR>> POSITIVE.    Chest radiograph with no infiltrate or effusion.    U/A 100 glucose.    EKG tracing reviewed with NSR a 77 with LVH.

## 2021-12-27 NOTE — H&P ADULT - HISTORY OF PRESENT ILLNESS
NIGHT HOSPITALIST:    Patient UNKNOWN to me previously, assigned to me at this point via the ER and by Dr. Jean to admit this 75 y/o M--patient seen with patient's adult son in attendance (son is an RN at [employer]) and is bilingual and declined ED video  device--patient with a history of hypothyroidism on Synthroid, essential HTN on Lopressor, aldactone, depression on Seroquel and Cymbalta, CAD with S/P cardiac catheterization by Dr. Daryl Patrick at [facility] in Nov 2019, type 2 DM on Januvia and bedtime Levemir, with the patient followed by his PCP above and apparently has had upper nasal congestion and loose BM for the past 3 weeks, with the son concerned with increased confusion and decreased ADL and exertional dyspnoea for the past 3 weeks, but worsening for the past few days, with the patient with an apparent outpatient lab assay with hyponatremia and was sent to the ER by his PCP, apparently with prior episodes of hyponatremia, under the presumption of the hyponatremia as the primary mechanism of the confusional state.  Patient had just received his booster dose of the COVID-10 Moderna 7 days ago.   No fever, no chills, no rigors.  NO sore throat but with upper nasal congestion as above.  NO neck pain.  No dyspnoea at present.  NO chest pain/pressure.  NO HA, no focal weakness.  No diaphoresis.

## 2021-12-27 NOTE — H&P ADULT - PROBLEM SELECTOR PLAN 2
See above.  Appears not to be the primary phenomenon.   Medications HELD as above.   Fluid restriction.  Follow Na+ and daily weights.   Would consider formal renal evaluation in the AM.

## 2021-12-27 NOTE — H&P ADULT - PROBLEM SELECTOR PLAN 1
See above.   CTT head ordered.  Presently NOT requiring supplemental O2 to warrant Decadron.   Inflammatory indices ordered.  Patient/ son agree to IV remdesivir.   would consider formal ID evaluation in the AM. See above.   CTT head ordered>>reviewed with radiology--NO acute bleed or mass.   Presently NOT requiring supplemental O2 to warrant Decadron.   Inflammatory indices ordered.  Patient/ son agree to IV remdesivir.   would consider formal ID evaluation in the AM.

## 2021-12-27 NOTE — H&P ADULT - REASON FOR ADMISSION
Upper nasal congestion and loose BM for 3 weeks, increased confusion and decreased ADL, GOTTLIEB for the past 2 days.

## 2021-12-27 NOTE — ED PROVIDER NOTE - NSICDXPASTSURGICALHX_GEN_ALL_CORE_FT
PAST SURGICAL HISTORY:  H/O heart artery stent     History of back surgery lumbar    History of cardiac catheterization 2010 2 stents, 2012 3 stents, 12/2014 2 coronary artery stents    S/P knee replacement, bilateral     S/P laminectomy with spinal fusion x 2    S/P shoulder surgery s/p R rotator cuff surgery

## 2021-12-27 NOTE — ED PROVIDER NOTE - CLINICAL SUMMARY MEDICAL DECISION MAKING FREE TEXT BOX
Attn - pt with hx of Hyponatremia with increased confusion this am - likely same.  pt not able to provide hx, but no recent complaints or trauma.  labs, admit.

## 2021-12-27 NOTE — ED PROVIDER NOTE - NSICDXPASTMEDICALHX_GEN_ALL_CORE_FT
PAST MEDICAL HISTORY:  Benign hypertrophy of prostate     CAD (coronary artery disease) cardiac stents x7    Depression     Diabetes type 2    Diverticulitis     Hyperlipidemia     Hypertension     Lumbar disc disorder     Lumbar radiculopathy     MI (myocardial infarction) 12/2014    OA (osteoarthritis)     Stented coronary artery     Thyroid disease

## 2021-12-27 NOTE — H&P ADULT - NSHPREVIEWOFSYSTEMS_GEN_ALL_CORE
No chest pain/pressure.  NO palpitations.  NO cough, no wheezing.   GOTTLIEB and poor exercise tolerance.  No abdominal pain, no red blood per rectum or melena.  No back pain, no tearing back pain.  NO rash.    NO joint pain.  NO SI/HI.  NO thyroid symptoms.  NO dysuria, no hematuria.  NO anorexia.

## 2021-12-27 NOTE — ED PROVIDER NOTE - NSICDXFAMILYHX_GEN_ALL_CORE_FT
FAMILY HISTORY:  Sibling  Still living? Unknown  Family history of heart disease, Age at diagnosis: Age Unknown

## 2021-12-27 NOTE — ED PROVIDER NOTE - OBJECTIVE STATEMENT
Attn - pt seen in [facility] from home - early this am increased confusion and lethargy. pt has hx of hyponatremia with last Na+ 121 on 12/21.  pt tends to drink frequently.  recent change in meds.  Son with pt and provides additional hx.  pt not able to provide reliable hx. no hx of seizures.  PMHx - IDDM, HTN, CAD with 7 stents. nonsmoker - rec'd COVID vaccine x 3.  pt has no recent complaint or trauma.

## 2021-12-27 NOTE — H&P ADULT - NSHPSOURCEINFOTX_GEN_ALL_CORE
Son in attendance (bilingual) with patient/son declining ED video  device. Son in attendance (bilingual) with patient/son declining ED video  device.  Son is an RN at [employer] and reviewed [employer].  Called [facility]  no response.

## 2021-12-27 NOTE — ED ADULT NURSE NOTE - OBJECTIVE STATEMENT
77 YO male with PMH hyponatremia, HTN, HLD, DM on insulin and oral meds, MI, CAD sp multiple stents, lumbar disc disorder sp laminectomy with spinal fusion, BPH, thyroid disease, depression, diverticulitis, sp shoulder surgery, sp bilateral knee replacements, via EMS from home, presenting with complaints of abnormal lab results. As per EMS, and pt's son at bedside, pt appeared to have increased confusion and lethargy. Pt states that pt has had a history of low sodium and is on sodium supplements. Pt's son states pt drinks fluids frequently. Pt's son states pt had a recent change in medication, unsure which medication. Pt denies chest pain, palpitations, shortness of breath, headache, visual disturbances, numbness/tingling, fever, chills, diaphoresis,  nausea, vomiting, constipation, diarrhea, or urinary symptoms.   Pt Axox2, gross neuro intact, PERRL 2 mm. Lungs clear throughout bilateral, respirations even, & non-labored. S1S2 heard, pulses strong and equal bilaterally. Abdomen soft, non-tender, non-distended. Skin warm, dry, and intact. Pt placed in position of comfort. Pt educated on call bell system and provided call bell. Bed in lowest position, wheels locked, appropriate side rails raised. Pt denies needs at this time.

## 2021-12-27 NOTE — ED PROVIDER NOTE - PHYSICAL EXAMINATION
Attn - arousable by verbal stimulation.  PERRL 2 mm, moist mm, lungs - clear, cor - rr, no M, Abdo - obese, soft, NT, ND, no CVAT.  Extrem - no edema or c/t.  Neuro - limited - PAULINO and no focal neuro - +AMS.

## 2021-12-27 NOTE — H&P ADULT - ASSESSMENT
NIGHT HOSPITALIST:     Self referral of patient to the ER following hyponatremia but at present with no delirium and likely not the primary phenomenon and chronic in the setting of initial diagnostic uncertainty but with upper airway symptoms for 3 weeks with apparently nonresolving symptoms with allergy medications, with COVID-19 infection but no evidence of oxygen requirements to warrant Decadron at present.   Reviewed with patient and son in attendance and agree with the Emergency Use Authorization under the FDA for IV remdesivir to shorten symptoms as a patient benefit.   NO clear evidence of bacterial infection even with mild bandemia, but blood cultures ordered.    Inflammatory indices ordered.  Would consider formal ID evaluation in the AM.    Patient with NO delirium with hyponatremia and will HOLD the aldactone, Cymbalta and Seroquel--can cause hyponatremia.   CTT head ordered to exclude intracranial process.  Fluid restriction and daily weights.  Consider formal renal evaluation in the AM.   Will follow serum sodium.    Will also upgrade to telemetry to exclude cardiac equivalent.  Echo.    Will also temporarily HOLD the Januvia and follow FS S/S and would consider recalculation of patient's bedtime Lantus requirements in the AM.    Patient/ son agree to pharmacologic DVT prophylaxis.

## 2021-12-27 NOTE — ED ADULT NURSE NOTE - NSIMPLEMENTINTERV_GEN_ALL_ED
Implemented All Fall Risk Interventions:  [place] to call system. Call bell, personal items and telephone within reach. Instruct patient to call for assistance. Room bathroom lighting operational. Non-slip footwear when patient is off stretcher. Physically safe environment: no spills, clutter or unnecessary equipment. Stretcher in lowest position, wheels locked, appropriate side rails in place. Provide visual cue, wrist band, yellow gown, etc. Monitor gait and stability. Monitor for mental status changes and reorient to person, place, and time. Review medications for side effects contributing to fall risk. Reinforce activity limits and safety measures with patient and family.

## 2021-12-27 NOTE — H&P ADULT - NSICDXPASTMEDICALHX_GEN_ALL_CORE_FT
PAST MEDICAL HISTORY:  2019 novel coronavirus disease (COVID-19)     Benign hypertrophy of prostate     CAD (coronary artery disease) cardiac stents x7    Depression     Diabetes type 2    Diverticulitis     Hyperlipidemia     Hypertension     Lumbar disc disorder     Lumbar radiculopathy     MI (myocardial infarction) 12/2014    OA (osteoarthritis)     Stented coronary artery     Thyroid disease

## 2021-12-28 LAB
A1C WITH ESTIMATED AVERAGE GLUCOSE RESULT: 7.1 % — HIGH (ref 4–5.6)
ANION GAP SERPL CALC-SCNC: 13 MMOL/L — SIGNIFICANT CHANGE UP (ref 5–17)
ANION GAP SERPL CALC-SCNC: 14 MMOL/L — SIGNIFICANT CHANGE UP (ref 5–17)
BUN SERPL-MCNC: 16 MG/DL — SIGNIFICANT CHANGE UP (ref 7–23)
BUN SERPL-MCNC: 16 MG/DL — SIGNIFICANT CHANGE UP (ref 7–23)
CALCIUM SERPL-MCNC: 8.7 MG/DL — SIGNIFICANT CHANGE UP (ref 8.4–10.5)
CALCIUM SERPL-MCNC: 8.8 MG/DL — SIGNIFICANT CHANGE UP (ref 8.4–10.5)
CHLORIDE SERPL-SCNC: 86 MMOL/L — LOW (ref 96–108)
CHLORIDE SERPL-SCNC: 87 MMOL/L — LOW (ref 96–108)
CO2 SERPL-SCNC: 19 MMOL/L — LOW (ref 22–31)
CO2 SERPL-SCNC: 21 MMOL/L — LOW (ref 22–31)
CREAT ?TM UR-MCNC: 54 MG/DL — SIGNIFICANT CHANGE UP
CREAT SERPL-MCNC: 1.21 MG/DL — SIGNIFICANT CHANGE UP (ref 0.5–1.3)
CREAT SERPL-MCNC: 1.27 MG/DL — SIGNIFICANT CHANGE UP (ref 0.5–1.3)
CULTURE RESULTS: SIGNIFICANT CHANGE UP
ESTIMATED AVERAGE GLUCOSE: 157 MG/DL — HIGH (ref 68–114)
GLUCOSE SERPL-MCNC: 150 MG/DL — HIGH (ref 70–99)
GLUCOSE SERPL-MCNC: 264 MG/DL — HIGH (ref 70–99)
HCT VFR BLD CALC: 29.8 % — LOW (ref 39–50)
HCV AB S/CO SERPL IA: 0.1 S/CO — SIGNIFICANT CHANGE UP (ref 0–0.99)
HCV AB SERPL-IMP: SIGNIFICANT CHANGE UP
HGB BLD-MCNC: 10.4 G/DL — LOW (ref 13–17)
INR BLD: 1.19 RATIO — HIGH (ref 0.88–1.16)
MCHC RBC-ENTMCNC: 33 PG — SIGNIFICANT CHANGE UP (ref 27–34)
MCHC RBC-ENTMCNC: 34.9 GM/DL — SIGNIFICANT CHANGE UP (ref 32–36)
MCV RBC AUTO: 94.6 FL — SIGNIFICANT CHANGE UP (ref 80–100)
NRBC # BLD: 0 /100 WBCS — SIGNIFICANT CHANGE UP (ref 0–0)
OSMOLALITY UR: 390 MOS/KG — SIGNIFICANT CHANGE UP (ref 300–900)
PHOSPHATE 24H UR-MCNC: 29.1 MG/DL — SIGNIFICANT CHANGE UP
PLATELET # BLD AUTO: 260 K/UL — SIGNIFICANT CHANGE UP (ref 150–400)
POTASSIUM SERPL-MCNC: 4.7 MMOL/L — SIGNIFICANT CHANGE UP (ref 3.5–5.3)
POTASSIUM SERPL-MCNC: 4.8 MMOL/L — SIGNIFICANT CHANGE UP (ref 3.5–5.3)
POTASSIUM SERPL-SCNC: 4.7 MMOL/L — SIGNIFICANT CHANGE UP (ref 3.5–5.3)
POTASSIUM SERPL-SCNC: 4.8 MMOL/L — SIGNIFICANT CHANGE UP (ref 3.5–5.3)
POTASSIUM UR-SCNC: 30 MMOL/L — SIGNIFICANT CHANGE UP
PROTHROM AB SERPL-ACNC: 14.2 SEC — HIGH (ref 10.6–13.6)
RBC # BLD: 3.15 M/UL — LOW (ref 4.2–5.8)
RBC # FLD: 13.3 % — SIGNIFICANT CHANGE UP (ref 10.3–14.5)
SODIUM SERPL-SCNC: 119 MMOL/L — CRITICAL LOW (ref 135–145)
SODIUM SERPL-SCNC: 121 MMOL/L — LOW (ref 135–145)
SODIUM UR-SCNC: 91 MMOL/L — SIGNIFICANT CHANGE UP
SPECIMEN SOURCE: SIGNIFICANT CHANGE UP
TSH SERPL-MCNC: 2.78 UIU/ML — SIGNIFICANT CHANGE UP (ref 0.27–4.2)
WBC # BLD: 6.14 K/UL — SIGNIFICANT CHANGE UP (ref 3.8–10.5)
WBC # FLD AUTO: 6.14 K/UL — SIGNIFICANT CHANGE UP (ref 3.8–10.5)

## 2021-12-28 PROCEDURE — 93306 TTE W/DOPPLER COMPLETE: CPT | Mod: 26

## 2021-12-28 PROCEDURE — 99222 1ST HOSP IP/OBS MODERATE 55: CPT

## 2021-12-28 RX ORDER — LEVOTHYROXINE SODIUM 125 MCG
25 TABLET ORAL DAILY
Refills: 0 | Status: DISCONTINUED | OUTPATIENT
Start: 2021-12-28 | End: 2022-01-06

## 2021-12-28 RX ORDER — DILTIAZEM HCL 120 MG
300 CAPSULE, EXT RELEASE 24 HR ORAL DAILY
Refills: 0 | Status: DISCONTINUED | OUTPATIENT
Start: 2021-12-28 | End: 2022-01-06

## 2021-12-28 RX ORDER — SPIRONOLACTONE 25 MG/1
0 TABLET, FILM COATED ORAL
Qty: 0 | Refills: 0 | DISCHARGE

## 2021-12-28 RX ORDER — TAMSULOSIN HYDROCHLORIDE 0.4 MG/1
0.4 CAPSULE ORAL AT BEDTIME
Refills: 0 | Status: DISCONTINUED | OUTPATIENT
Start: 2021-12-28 | End: 2022-01-06

## 2021-12-28 RX ORDER — SIMVASTATIN 20 MG/1
20 TABLET, FILM COATED ORAL AT BEDTIME
Refills: 0 | Status: DISCONTINUED | OUTPATIENT
Start: 2021-12-28 | End: 2022-01-06

## 2021-12-28 RX ORDER — REMDESIVIR 5 MG/ML
INJECTION INTRAVENOUS
Refills: 0 | Status: COMPLETED | OUTPATIENT
Start: 2021-12-28 | End: 2022-01-01

## 2021-12-28 RX ORDER — LEVOTHYROXINE SODIUM 125 MCG
1 TABLET ORAL
Qty: 0 | Refills: 0 | DISCHARGE

## 2021-12-28 RX ORDER — REMDESIVIR 5 MG/ML
100 INJECTION INTRAVENOUS EVERY 24 HOURS
Refills: 0 | Status: COMPLETED | OUTPATIENT
Start: 2021-12-29 | End: 2022-01-01

## 2021-12-28 RX ORDER — SODIUM CHLORIDE 9 MG/ML
1 INJECTION INTRAMUSCULAR; INTRAVENOUS; SUBCUTANEOUS THREE TIMES A DAY
Refills: 0 | Status: DISCONTINUED | OUTPATIENT
Start: 2021-12-28 | End: 2021-12-30

## 2021-12-28 RX ORDER — LEVOCETIRIZINE DIHYDROCHLORIDE 0.5 MG/ML
0 SOLUTION ORAL
Qty: 0 | Refills: 0 | DISCHARGE

## 2021-12-28 RX ORDER — BUDESONIDE AND FORMOTEROL FUMARATE DIHYDRATE 160; 4.5 UG/1; UG/1
2 AEROSOL RESPIRATORY (INHALATION)
Refills: 0 | Status: DISCONTINUED | OUTPATIENT
Start: 2021-12-28 | End: 2022-01-06

## 2021-12-28 RX ORDER — REMDESIVIR 5 MG/ML
200 INJECTION INTRAVENOUS EVERY 24 HOURS
Refills: 0 | Status: COMPLETED | OUTPATIENT
Start: 2021-12-28 | End: 2021-12-28

## 2021-12-28 RX ADMIN — BUDESONIDE AND FORMOTEROL FUMARATE DIHYDRATE 2 PUFF(S): 160; 4.5 AEROSOL RESPIRATORY (INHALATION) at 21:54

## 2021-12-28 RX ADMIN — Medication 300 MILLIGRAM(S): at 13:41

## 2021-12-28 RX ADMIN — Medication 100 GRAM(S): at 00:10

## 2021-12-28 RX ADMIN — SODIUM CHLORIDE 1 GRAM(S): 9 INJECTION INTRAMUSCULAR; INTRAVENOUS; SUBCUTANEOUS at 21:54

## 2021-12-28 RX ADMIN — Medication 50 MICROGRAM(S): at 05:34

## 2021-12-28 RX ADMIN — Medication 650 MILLIGRAM(S): at 20:28

## 2021-12-28 RX ADMIN — Medication 81 MILLIGRAM(S): at 11:55

## 2021-12-28 RX ADMIN — SIMVASTATIN 20 MILLIGRAM(S): 20 TABLET, FILM COATED ORAL at 21:54

## 2021-12-28 RX ADMIN — Medication 100 MILLIGRAM(S): at 00:11

## 2021-12-28 RX ADMIN — Medication 3: at 13:40

## 2021-12-28 RX ADMIN — Medication 1: at 08:08

## 2021-12-28 RX ADMIN — Medication 100 MILLIGRAM(S): at 05:34

## 2021-12-28 RX ADMIN — Medication 100 MILLIGRAM(S): at 17:13

## 2021-12-28 RX ADMIN — Medication 25 MICROGRAM(S): at 13:41

## 2021-12-28 RX ADMIN — Medication 1: at 21:55

## 2021-12-28 RX ADMIN — ENOXAPARIN SODIUM 40 MILLIGRAM(S): 100 INJECTION SUBCUTANEOUS at 12:00

## 2021-12-28 RX ADMIN — Medication 650 MILLIGRAM(S): at 22:27

## 2021-12-28 RX ADMIN — TAMSULOSIN HYDROCHLORIDE 0.4 MILLIGRAM(S): 0.4 CAPSULE ORAL at 21:47

## 2021-12-28 RX ADMIN — Medication 0: at 17:11

## 2021-12-28 RX ADMIN — REMDESIVIR 500 MILLIGRAM(S): 5 INJECTION INTRAVENOUS at 11:50

## 2021-12-28 RX ADMIN — SODIUM CHLORIDE 1 GRAM(S): 9 INJECTION INTRAMUSCULAR; INTRAVENOUS; SUBCUTANEOUS at 13:41

## 2021-12-28 RX ADMIN — Medication 1: at 01:47

## 2021-12-28 NOTE — PATIENT PROFILE ADULT - FALL HARM RISK - HARM RISK INTERVENTIONS

## 2021-12-28 NOTE — ED ADULT NURSE REASSESSMENT NOTE - NS ED NURSE REASSESS COMMENT FT1
Pt placed on condom catheter. Pt found to be soiled. Perineal cleansed with cleansing solution. Clean diaper and sarina applied underneath perineum. Clean linen provided to patient. Pt placed in position of comfort. Pt educated on call bell system and provided call bell. Bed in lowest position, wheels locked, appropriate side rails raised. Pt denies needs at this time.

## 2021-12-28 NOTE — CHART NOTE - NSCHARTNOTEFT_GEN_A_CORE
Internal Medicine PA Note    Basic Metabolic Panel (12.28.21 @ 01:07)    Sodium, Serum: 119: TEST REPEATED. mmol/L    Potassium, Serum: 4.8 mmol/L    Chloride, Serum: 86 mmol/L    Carbon Dioxide, Serum: 19 mmol/L    Anion Gap, Serum: 14 mmol/L    Blood Urea Nitrogen, Serum: 16 mg/dL    Creatinine, Serum: 1.27 mg/dL    Glucose, Serum: 264 mg/dL    Calcium, Total Serum: 8.8 mg/dL    eGFR if Non : 55      #Hyponatremia  - Repeat Na is 119 from 120. Patient was hyperglycemic (264); Na corrected for hyperglycemia --> Na is 122/123.   - Repeat BMP at 5AM.   - Renal c/s as per attending's discretion.   - C/w fluid restriction.  - Patient is A&OX3, no acute neurological dysfunction.   - Dr. Guillaume updated and made aware.    Jonh CISSE  Dept of Medicine  [id number] Internal Medicine PA Note    Basic Metabolic Panel (12.28.21 @ 01:07)    Sodium, Serum: 119: TEST REPEATED. mmol/L    Potassium, Serum: 4.8 mmol/L    Chloride, Serum: 86 mmol/L    Carbon Dioxide, Serum: 19 mmol/L    Anion Gap, Serum: 14 mmol/L    Blood Urea Nitrogen, Serum: 16 mg/dL    Creatinine, Serum: 1.27 mg/dL    Glucose, Serum: 264 mg/dL    Calcium, Total Serum: 8.8 mg/dL    eGFR if Non : 55      #Hyponatremia  - Repeat Na is 119 from 120. Patient was hyperglycemic (264); Na corrected for hyperglycemia --> Na is 122/123.   - Repeat BMP at 5AM.   - Renal c/s as per attending's discretion.   - C/w fluid restriction.  - Patient is A&OX3, no acute neurological dysfunction.   - RN aware of plan.   - Dr. Guillaume updated and made aware.    Jonh CISSE  Dept of Medicine  [id number] Internal Medicine PA Note    Basic Metabolic Panel (12.28.21 @ 01:07)    Sodium, Serum: 119: TEST REPEATED. mmol/L    Potassium, Serum: 4.8 mmol/L    Chloride, Serum: 86 mmol/L    Carbon Dioxide, Serum: 19 mmol/L    Anion Gap, Serum: 14 mmol/L    Blood Urea Nitrogen, Serum: 16 mg/dL    Creatinine, Serum: 1.27 mg/dL    Glucose, Serum: 264 mg/dL    Calcium, Total Serum: 8.8 mg/dL    eGFR if Non : 55      #Hyponatremia  - Repeat Na is 119 from 120. Patient was hyperglycemic (264); Na corrected for hyperglycemia --> Na is 122/123.   - Patient is A&OX3, no acute neurological dysfunction. VSS. Hx of hyponatremia.   - Repeat BMP at 5AM. C/w fluid restriction.  - Neurochecks q4hr.   - Renal c/s as per attending's discretion.   - RN aware of plan.   - Dr. Guillaume updated and made aware. No acute intervention at this time.    Jonh CISSE  Dept of Medicine  [id number]

## 2021-12-28 NOTE — PROGRESS NOTE ADULT - SUBJECTIVE AND OBJECTIVE BOX
Name of Patient : JERRI SIDHU  MRN: [id number]  Date of visit: 21 @ 14:05      Subjective: Patient seen and examined. No new events except as noted.   Patient is COVID + and seen on [facility]. Patient is saturating 98% on room air. Patient reports a productive cough of white-color sputum and is complaining of a sore throat.   Patient is afebrile.     REVIEW OF SYSTEMS:    CONSTITUTIONAL: + Afebrile   EYES/ENT: +Sore throat   NECK: No pain or stiffness  RESPIRATORY: +Productive cough; Denies shortness of breath   CARDIOVASCULAR: No chest pain or palpitations  GASTROINTESTINAL: No abdominal or epigastric pain. No nausea, vomiting, or hematemesis; No diarrhea or constipation. No melena or hematochezia.  GENITOURINARY: No dysuria, frequency or hematuria  NEUROLOGICAL: No numbness or weakness  SKIN: No itching, burning, rashes, or lesions   All other review of systems is negative unless indicated above.    MEDICATIONS:  MEDICATIONS  (STANDING):  aspirin enteric coated 81 milliGRAM(s) Oral daily  budesonide 160 MICROgram(s)/formoterol 4.5 MICROgram(s) Inhaler 2 Puff(s) Inhalation two times a day  dextrose 40% Gel 15 Gram(s) Oral once  dextrose 5%. 1000 milliLiter(s) (50 mL/Hr) IV Continuous <Continuous>  dextrose 5%. 1000 milliLiter(s) (100 mL/Hr) IV Continuous <Continuous>  dextrose 50% Injectable 25 Gram(s) IV Push once  dextrose 50% Injectable 12.5 Gram(s) IV Push once  dextrose 50% Injectable 25 Gram(s) IV Push once  diltiazem    milliGRAM(s) Oral daily  enoxaparin Injectable 40 milliGRAM(s) SubCutaneous daily  glucagon  Injectable 1 milliGRAM(s) IntraMuscular once  insulin lispro (ADMELOG) corrective regimen sliding scale   SubCutaneous three times a day before meals  insulin lispro (ADMELOG) corrective regimen sliding scale   SubCutaneous at bedtime  levothyroxine 25 MICROGram(s) Oral daily  metoprolol tartrate 100 milliGRAM(s) Oral two times a day  remdesivir  IVPB   IV Intermittent   simvastatin 20 milliGRAM(s) Oral at bedtime  sodium chloride 1 Gram(s) Oral three times a day  tamsulosin 0.4 milliGRAM(s) Oral at bedtime      PHYSICAL EXAM:  T(C): 36.7 (21 @ 12:20), Max: 37.1 (21 @ 14:40)  HR: 69 (21 @ 12:20) (69 - 90)  BP: 149/77 (21 @ 12:20) (136/78 - 187/75)  RR: 18 (21 @ 12:20) (16 - 18)  SpO2: 98% (21 @ 12:20) (96% - 100%)  Wt(kg): --  I&O's Summary    28 Dec 2021 07:01  -  28 Dec 2021 14:05  --------------------------------------------------------  IN: 0 mL / OUT: 800 mL / NET: -800 mL      Height (cm): 165.1 ( @ 14:40)  Weight (kg): 74.8 ( @ 14:40)  BMI (kg/m2): 27.4 ( @ 14:40)  BSA (m2): 1.82 ( @ 14:40)    Appearance: Normal, calm, speaking in full sentences 	  HEENT:  PERRLA   Lymphatic: No lymphadenopathy   Cardiovascular: Normal S1 S2, no JVD  Respiratory: normal effort , clear  Gastrointestinal:  Soft, Non-tender  Skin: No rashes,  warm to touch  Psychiatry:  Mood & affect appropriate  Musculoskeletal: No edema      All labs, Imaging and EKGs personally reviewed                             10.4   6.14  )-----------( 260      ( 28 Dec 2021 02:07 )             29.8                   121<L>  |  87<L>  |  16  ----------------------------<  150<H>  4.7   |  21<L>  |  1.21    Ca    8.7      28 Dec 2021 06:17  Phos  3.6       Mg     1.8         TPro  7.2  /  Alb  3.9  /  TBili  0.3  /  DBili  <0.1  /  AST  19  /  ALT  19  /  AlkPhos  62      PT/INR - ( 28 Dec 2021 01:10 )   PT: 14.2 sec;   INR: 1.19 ratio                CARDIAC MARKERS ( 28 Dec 2021 01:07 )  x     / x     / 121 U/L / x     / x                  Urinalysis Basic - ( 27 Dec 2021 16:48 )    Color: Light Yellow / Appearance: Clear / S.011 / pH: x  Gluc: x / Ketone: Negative  / Bili: Negative / Urobili: Negative   Blood: x / Protein: 30 mg/dL / Nitrite: Negative   Leuk Esterase: Negative / RBC: 1 /hpf / WBC 1 /HPF   Sq Epi: x / Non Sq Epi: 0 /hpf / Bacteria: Negative      21 @ 07:01  -  21 @ 14:05  --------------------------------------------------------  IN: 0 mL / OUT: 800 mL / NET: -800 mL        e< from: Transthoracic Echocardiogram (21 @ 07:52) >    Patient name: JERRI SIDHU  YOB: 1945   Age: 76 (M)   MR#: [id number]  Study Date: 2021  [facility]  Study quality: Technically difficult  Referring Physician: Gareth Jean MD  Blood Pressure: 160/78 mmHg  Height: 165 cm  Weight: 75 kg  BSA: 1.8 m2  ------------------------------------------------------------------------  PROCEDURE: Transthoracic echocardiogram with 2-D, M-Mode  and complete spectral and color flow Doppler.  INDICATION: Dyspnea, unspecified (R06.00)  ------------------------------------------------------------------------  Dimensions:    Normal Values:  LA:     2.9    2.0 - 4.0 cm  Ao:     3.0    2.0 - 3.8 cm  SEPTUM: 0.9    0.6 - 1.2 cm  PWT:    0.9    0.6 - 1.1 cm  LVIDd:  4.7    3.0 - 5.6 cm  LVIDs:  3.3    1.8 - 4.0 cm  Derived variables:  LVMI: 78 g/m2  RWT: 0.38  EF (Visual Estimate): 65 %  Doppler Peak Velocity (m/sec): AoV=1.7  ------------------------------------------------------------------------  Observations:  Mitral Valve: Mitral annular calcification.  Aortic Valve/Aorta: Calcified aortic valve with normal  opening. Mild aortic regurgitation.  Normal aortic root.  Left Atrium: Normal left atrium.  Left Ventricle: Normal left ventricular internal dimensions  and wall thicknesses.  Normal left ventricular systolic function. No segmental  wall motion abnormalities.  Impaired LV-relaxation with normal filling pressure.  Right Heart: Normal right atrium. Normal right ventricular  size andsystolic function.  Normal tricuspid valve. Normal pulmonic valve.  Pericardium/Pleura: Normal pericardium with no pericardial  effusion.  Hemodynamic: Estimated right atrial pressure is normal.  No evidence of pulmonary hypertension.  ------------------------------------------------------------------------  Conclusions:  Technically difficult study.  Normal left ventricular systolic function. No segmental  wall motion abnormalities.  ------------------------------------------------------------------------    < end of copied text >      < from: CT Head No Cont (21 @ 21:58) >    ACC: [id number] EXAM:  CT BRAIN                          PROCEDURE DATE:  2021          INTERPRETATION:  Clinical indication: Confusion.    Multiple axial sections were performed from base of skull to vertex   without contrast enhancement. Coronal and sagittal destructions were   performed as well    This exam is compared prior noncontrast head CT performed on 2017.    Parenchymal volume loss and chronic microvascular ischemic changes are   identified. These findings have increased when compared with the prior   exam.    Abnormal area of low-attenuation involving the right corona radiata   region is again seen which is compatible with old infarct. Adjacent area   of low-attenuation on the right centrum semiovale region is now   identified and more conspicuous when compared prior exam. This could be   compatible with a subacute to chronic infarct versus areas of chronic   microvascular ischemic changes. MRI can be done to better characterize   this finding if there are are nocontraindications.    Age-indeterminate lacunar infarct is seen involving the lateral aspect of   the left thalamus. This is new when compared with the prior exam. This   too can be better characterized with MRI. There is no evidence acute   hemorrhage identified.    Evaluation of the osseous structures with the appropriate window appears   normal    The visualized paranasal sinuses mastoid and mastoid clear.    IMPRESSION: Abnormal area of low-attenuation involving the right centrum   semiovale region.    Age-indeterminate lacunar infarct is seen involving the left thalamus.    --- End of Report ---        < end of copied text >       Name of Patient : JERRI SIDHU  MRN: [id number]  Date of visit: 21 @ 14:05      Subjective: Patient seen and examined. No new events except as noted.   Patient is COVID + and seen on [facility]. Patient is saturating 98% on room air. Patient reports a productive cough of white-color sputum and is complaining of a sore throat.   Patient is afebrile.     REVIEW OF SYSTEMS:    CONSTITUTIONAL: + Afebrile   EYES/ENT: +Sore throat   NECK: No pain or stiffness  RESPIRATORY: +Productive cough; Denies shortness of breath   CARDIOVASCULAR: No chest pain or palpitations  GASTROINTESTINAL: No abdominal or epigastric pain. No nausea, vomiting, or hematemesis; No diarrhea or constipation. No melena or hematochezia.  GENITOURINARY: No dysuria, frequency or hematuria  NEUROLOGICAL: No numbness or weakness  SKIN: No itching, burning, rashes, or lesions   All other review of systems is negative unless indicated above.    MEDICATIONS:  MEDICATIONS  (STANDING):  aspirin enteric coated 81 milliGRAM(s) Oral daily  budesonide 160 MICROgram(s)/formoterol 4.5 MICROgram(s) Inhaler 2 Puff(s) Inhalation two times a day  dextrose 40% Gel 15 Gram(s) Oral once  dextrose 5%. 1000 milliLiter(s) (50 mL/Hr) IV Continuous <Continuous>  dextrose 5%. 1000 milliLiter(s) (100 mL/Hr) IV Continuous <Continuous>  dextrose 50% Injectable 25 Gram(s) IV Push once  dextrose 50% Injectable 12.5 Gram(s) IV Push once  dextrose 50% Injectable 25 Gram(s) IV Push once  diltiazem    milliGRAM(s) Oral daily  enoxaparin Injectable 40 milliGRAM(s) SubCutaneous daily  glucagon  Injectable 1 milliGRAM(s) IntraMuscular once  insulin lispro (ADMELOG) corrective regimen sliding scale   SubCutaneous three times a day before meals  insulin lispro (ADMELOG) corrective regimen sliding scale   SubCutaneous at bedtime  levothyroxine 25 MICROGram(s) Oral daily  metoprolol tartrate 100 milliGRAM(s) Oral two times a day  remdesivir  IVPB   IV Intermittent   simvastatin 20 milliGRAM(s) Oral at bedtime  sodium chloride 1 Gram(s) Oral three times a day  tamsulosin 0.4 milliGRAM(s) Oral at bedtime      PHYSICAL EXAM:  T(C): 36.7 (21 @ 12:20), Max: 37.1 (21 @ 14:40)  HR: 69 (21 @ 12:20) (69 - 90)  BP: 149/77 (21 @ 12:20) (136/78 - 187/75)  RR: 18 (21 @ 12:20) (16 - 18)  SpO2: 98% (21 @ 12:20) (96% - 100%)  Wt(kg): --  I&O's Summary    28 Dec 2021 07:01  -  28 Dec 2021 14:05  --------------------------------------------------------  IN: 0 mL / OUT: 800 mL / NET: -800 mL      Height (cm): 165.1 ( @ 14:40)  Weight (kg): 74.8 ( @ 14:40)  BMI (kg/m2): 27.4 ( @ 14:40)  BSA (m2): 1.82 ( @ 14:40)    Appearance: Normal, calm, speaking in full sentences 	  HEENT:  PERRLA   Lymphatic: No lymphadenopathy   Cardiovascular: Normal S1 S2, no JVD  Respiratory: normal effort , clear  Gastrointestinal:  Soft, Non-tender  Skin: No rashes,  warm to touch  Psychiatry:  Mood & affect appropriate  Musculoskeletal: No edema      All labs, Imaging and EKGs personally reviewed                             10.4   6.14  )-----------( 260      ( 28 Dec 2021 02:07 )             29.8                   121<L>  |  87<L>  |  16  ----------------------------<  150<H>  4.7   |  21<L>  |  1.21    Ca    8.7      28 Dec 2021 06:17  Phos  3.6       Mg     1.8         TPro  7.2  /  Alb  3.9  /  TBili  0.3  /  DBili  <0.1  /  AST  19  /  ALT  19  /  AlkPhos  62      PT/INR - ( 28 Dec 2021 01:10 )   PT: 14.2 sec;   INR: 1.19 ratio                CARDIAC MARKERS ( 28 Dec 2021 01:07 )  x     / x     / 121 U/L / x     / x                  Urinalysis Basic - ( 27 Dec 2021 16:48 )    Color: Light Yellow / Appearance: Clear / S.011 / pH: x  Gluc: x / Ketone: Negative  / Bili: Negative / Urobili: Negative   Blood: x / Protein: 30 mg/dL / Nitrite: Negative   Leuk Esterase: Negative / RBC: 1 /hpf / WBC 1 /HPF   Sq Epi: x / Non Sq Epi: 0 /hpf / Bacteria: Negative      21 @ 07:01  -  21 @ 14:05  --------------------------------------------------------  IN: 0 mL / OUT: 800 mL / NET: -800 mL       from: Transthoracic Echocardiogram (21 @ 07:52) >    Patient name: JERRI SIDHU  YOB: 1945   Age: 76 (M)   MR#: [id number]  Study Date: 2021  [facility]  Study quality: Technically difficult  Referring Physician: Gareth Jean MD  Blood Pressure: 160/78 mmHg  Height: 165 cm  Weight: 75 kg  BSA: 1.8 m2  ------------------------------------------------------------------------  PROCEDURE: Transthoracic echocardiogram with 2-D, M-Mode  and complete spectral and color flow Doppler.  INDICATION: Dyspnea, unspecified (R06.00)  ------------------------------------------------------------------------  Dimensions:    Normal Values:  LA:     2.9    2.0 - 4.0 cm  Ao:     3.0    2.0 - 3.8 cm  SEPTUM: 0.9    0.6 - 1.2 cm  PWT:    0.9    0.6 - 1.1 cm  LVIDd:  4.7    3.0 - 5.6 cm  LVIDs:  3.3    1.8 - 4.0 cm  Derived variables:  LVMI: 78 g/m2  RWT: 0.38  EF (Visual Estimate): 65 %  Doppler Peak Velocity (m/sec): AoV=1.7  ------------------------------------------------------------------------  Observations:  Mitral Valve: Mitral annular calcification.  Aortic Valve/Aorta: Calcified aortic valve with normal  opening. Mild aortic regurgitation.  Normal aortic root.  Left Atrium: Normal left atrium.  Left Ventricle: Normal left ventricular internal dimensions  and wall thicknesses.  Normal left ventricular systolic function. No segmental  wall motion abnormalities.  Impaired LV-relaxation with normal filling pressure.  Right Heart: Normal right atrium. Normal right ventricular  size andsystolic function.  Normal tricuspid valve. Normal pulmonic valve.  Pericardium/Pleura: Normal pericardium with no pericardial  effusion.  Hemodynamic: Estimated right atrial pressure is normal.  No evidence of pulmonary hypertension.  ------------------------------------------------------------------------  Conclusions:  Technically difficult study.  Normal left ventricular systolic function. No segmental  wall motion abnormalities.  ------------------------------------------------------------------------    < end of copied text >      < from: CT Head No Cont (21 @ 21:58) >    ACC: [id number] EXAM:  CT BRAIN                          PROCEDURE DATE:  2021          INTERPRETATION:  Clinical indication: Confusion.    Multiple axial sections were performed from base of skull to vertex   without contrast enhancement. Coronal and sagittal destructions were   performed as well    This exam is compared prior noncontrast head CT performed on 2017.    Parenchymal volume loss and chronic microvascular ischemic changes are   identified. These findings have increased when compared with the prior   exam.    Abnormal area of low-attenuation involving the right corona radiata   region is again seen which is compatible with old infarct. Adjacent area   of low-attenuation on the right centrum semiovale region is now   identified and more conspicuous when compared prior exam. This could be   compatible with a subacute to chronic infarct versus areas of chronic   microvascular ischemic changes. MRI can be done to better characterize   this finding if there are are nocontraindications.    Age-indeterminate lacunar infarct is seen involving the lateral aspect of   the left thalamus. This is new when compared with the prior exam. This   too can be better characterized with MRI. There is no evidence acute   hemorrhage identified.    Evaluation of the osseous structures with the appropriate window appears   normal    The visualized paranasal sinuses mastoid and mastoid clear.    IMPRESSION: Abnormal area of low-attenuation involving the right centrum   semiovale region.    Age-indeterminate lacunar infarct is seen involving the left thalamus.    --- End of Report ---        < end of copied text >

## 2021-12-28 NOTE — PROGRESS NOTE ADULT - ASSESSMENT
Patient is a 77 y/o M--with a PMHx of hypothyroidism on Synthroid, essential HTN on Lopressor, aldactone, depression on Seroquel and Cymbalta, CAD with S/P cardiac catheterization by Dr. Daryl Patrick at [facility] in Nov 2019, type 2 DM on Januvia and bedtime Levemir, with the patient followed by his PCP above and apparently has had upper nasal congestion and loose BM for the past 3 weeks, with the son concerned with increased confusion and decreased ADL and exertional dyspnoea for the past 3 weeks, but worsening for the past few days, with the patient with an apparent outpatient lab assay with hyponatremia and was sent to the ER by his PCP, apparently with prior episodes of hyponatremia, under the presumption of the hyponatremia as the primary mechanism of the confusional state.  Patient had just received his booster dose of the COVID-10 Moderna 7 days ago.   No fever, no chills, no rigors.  NO sore throat but with upper nasal congestion as above.  NO neck pain.  No dyspnoea at present.  NO chest pain/pressure.  NO HA, no focal weakness.  No diaphoresis.        #COVID-19  --CTT head ordered--NO acute bleed or mass.     --Presently NOT requiring supplemental O2 to warrant Decadron.     --Patient is currently saturating 98% on room air  --Discussed with ID attending Dr. Cintron, patient is not in need of Dexamethasone   --Per ID, can continue with Remdesivir for now -- monitor renal function and LFTs   --Inflammatory indices ordered   --Continue with Symbicort 160  --Monitor patient clinically O2 saturation, vital signs and temperature curve       #Hyponatremia.   --CT head   --Continue with Fluid restriction.   --Monitor Na+ and daily weights.    --Renal evaluation, F/U Recommendations  --Started on Salt tab (Sodium chloride)  per renal   --Continue to hold aldactone, Cymbalta, and Seroquel   --Monitor BMP        #CAD (coronary artery disease).   --Upgraded to telemetry to exclude cardiac equivalent.  --Continue with Aspirin   --Continue with Simvastatin   --Cardio consult PRN   --Check ECHO -- EF of 65%       #Type 2 diabetes mellitus.   --Continue with ISS for now  --Patient was on bedtime Lantus at home   --Continue to monitor FS and serum glucose levels  --Avoid Hypo/Hyperglycemia  --Checking A1C -- results currently pending   --Endocrinology consultation PRN       #Productive cough  --Patient reporting productive cough with white sputum  --Will check sputum culture (Ordered) F/U results       #Sore throat  --Patient endorsing sore throat  --Will check Throat culture (ordered)       #HTN  --Monitor Vital Signs and BP closely  --Continue with home medication Lopressor for now  --Aldactone is on hold as may cause HypoNa  --Monitor BP       #Loose bowels  --Per patient experienced loose bowel outpatient   --If occurs and of watery consistency check for C.Diff       #Depression  --Seroquel and Cymbalta on hold currently due to HypoNa  --Continue to monitor patient   --Psych consult PRN     #Hypothyroidism   --TSH on this admission-- WNL at 2.78  --Continue with home dose Synthroid     #Need for prophylactic measure.   -- DVT prophylaxis with Lovenox 40     Patient is a 75 y/o M--with a PMHx of hypothyroidism on Synthroid, essential HTN on Lopressor, aldactone, depression on Seroquel and Cymbalta, CAD with S/P cardiac catheterization by Dr. Daryl Patrick at [facility] in Nov 2019, type 2 DM on Januvia and bedtime Levemir, with the patient followed by his PCP above and apparently has had upper nasal congestion and loose BM for the past 3 weeks, with the son concerned with increased confusion and decreased ADL and exertional dyspnoea for the past 3 weeks, but worsening for the past few days, with the patient with an apparent outpatient lab assay with hyponatremia and was sent to the ER by his PCP, apparently with prior episodes of hyponatremia, under the presumption of the hyponatremia as the primary mechanism of the confusional state.  Patient had just received his booster dose of the COVID-10 Moderna 7 days ago.   No fever, no chills, no rigors.  NO sore throat but with upper nasal congestion as above.  NO neck pain.  No dyspnoea at present.  NO chest pain/pressure.  NO HA, no focal weakness.  No diaphoresis.        #COVID-19  --CTT head ordered--NO acute bleed or mass.     --Presently NOT requiring supplemental O2 to warrant Decadron.     --Patient is currently saturating 98% on room air  --Discussed with ID attending Dr. Cintron, patient is not in need of Dexamethasone   --Per ID, can continue with Remdesivir for now -- monitor renal function and LFTs   --Inflammatory indices ordered   --Continue with Symbicort 160  --Monitor patient clinically O2 saturation, vital signs and temperature curve       #Hyponatremia.   --CT head   --Continue with Fluid restriction.   --Monitor Na+ and daily weights.    --Renal evaluation, F/U Recommendations  --Started on Salt tab (Sodium chloride)  per renal   --Continue to hold aldactone, Cymbalta, and Seroquel   --Monitor BMP        #CAD (coronary artery disease).   --Upgraded to telemetry to exclude cardiac equivalent.  --Continue with Aspirin   --Continue with Simvastatin   --Cardio consult PRN   --Check ECHO -- EF of 65%       #Type 2 diabetes mellitus.   --Continue with ISS for now  --Patient was on bedtime Lantus at home   --Continue to monitor FS and serum glucose levels  --Avoid Hypo/Hyperglycemia  --Checking A1C -- results currently pending   --Endocrinology consultation PRN       #Productive cough  --Patient reporting productive cough with white sputum  --Will check sputum culture (Ordered) F/U results       #Sore throat  --Patient endorsing sore throat  --Will check Throat culture (ordered)       #HTN  --Monitor Vital Signs and BP closely  --Continue with home medication Lopressor for now  --Aldactone is on hold as may cause HypoNa  --Monitor BP       #Loose bowels  --Per patient experienced loose bowel outpatient   --If occurs and of watery consistency check for C.Diff   --Likely due to COVID  --Continue to monitor       #Depression  --Seroquel and Cymbalta on hold currently due to HypoNa  --Continue to monitor patient   --Psych consult PRN     #Hypothyroidism   --TSH on this admission-- WNL at 2.78  --Continue with home dose Synthroid     #Need for prophylactic measure.   -- DVT prophylaxis with Lovenox 40    --Patient will need outpatient follow up for CT Head findings  Patient is a 77 y/o M--with a PMHx of hypothyroidism on Synthroid, essential HTN on Lopressor, aldactone, depression on Seroquel and Cymbalta, CAD with S/P cardiac catheterization by Dr. Daryl Patrick at [place] [facility] in Nov 2019, type 2 DM on Januvia and bedtime Levemir, with the patient followed by his PCP above and apparently has had upper nasal congestion and loose BM for the past 3 weeks, with the son concerned with increased confusion and decreased ADL and exertional dyspnoea for the past 3 weeks, but worsening for the past few days, with the patient with an apparent outpatient lab assay with hyponatremia and was sent to the ER by his PCP, apparently with prior episodes of hyponatremia, under the presumption of the hyponatremia as the primary mechanism of the confusional state.  Patient had just received his booster dose of the COVID-10 Moderna 7 days ago.   No fever, no chills, no rigors.  NO sore throat but with upper nasal congestion as above.  NO neck pain.  No dyspnoea at present.  NO chest pain/pressure.  NO HA, no focal weakness.  No diaphoresis.        #COVID-19  --CTT head ordered--NO acute bleed or mass.     --Presently NOT requiring supplemental O2 to warrant Decadron.     --Patient is currently saturating 98% on room air  --Discussed with ID attending Dr. Cintron, patient is not in need of Dexamethasone   --Per ID, can continue with Remdesivir for now -- monitor renal function and LFTs   --Inflammatory indices ordered   --Continue with Symbicort 160  --Monitor patient clinically O2 saturation, vital signs and temperature curve       #Hyponatremia.   --CT head   --Continue with Fluid restriction.   --Monitor Na+ and daily weights.    --Renal evaluation, F/U Recommendations  --Started on Salt tab (Sodium chloride)  per renal   --Continue to hold aldactone, Cymbalta, and Seroquel   --Monitor BMP        #CAD (coronary artery disease).   --Upgraded to telemetry to exclude cardiac equivalent.  --Continue with Aspirin   --Continue with Simvastatin   --Cardio consult PRN   --Check ECHO -- EF of 65%       #Type 2 diabetes mellitus.   --Continue with ISS for now  --Patient was on bedtime Lantus at home   --Continue to monitor FS and serum glucose levels  --Avoid Hypo/Hyperglycemia  --Checking A1C -- results currently pending   --Endocrinology consultation PRN       #Productive cough  --Patient reporting productive cough with white sputum  --Will check sputum culture (Ordered) F/U results       #Sore throat  --Patient endorsing sore throat  --Will check Throat culture (ordered)   -- tylenol for pain control       #HTN  --Monitor Vital Signs and BP closely  --Continue with home medication Lopressor for now  --Aldactone is on hold as may cause HypoNa  --Monitor BP       #Loose bowels  --Per patient experienced loose bowel outpatient   --If occurs and of watery consistency check for C.Diff   --Likely due to COVID  --Continue to monitor       #Depression  --Seroquel and Cymbalta on hold currently due to HypoNa  --Continue to monitor patient   --Psych consult PRN     #Hypothyroidism   --TSH on this admission-- WNL at 2.78  --Continue with home dose Synthroid     #Need for prophylactic measure.   -- DVT prophylaxis with Lovenox 40    --Patient will need outpatient follow up for CT Head findings

## 2021-12-29 LAB
ALBUMIN SERPL ELPH-MCNC: 3.7 G/DL — SIGNIFICANT CHANGE UP (ref 3.3–5)
ALP SERPL-CCNC: 60 U/L — SIGNIFICANT CHANGE UP (ref 40–120)
ALT FLD-CCNC: 20 U/L — SIGNIFICANT CHANGE UP (ref 10–45)
ANION GAP SERPL CALC-SCNC: 14 MMOL/L — SIGNIFICANT CHANGE UP (ref 5–17)
AST SERPL-CCNC: 24 U/L — SIGNIFICANT CHANGE UP (ref 10–40)
BILIRUB DIRECT SERPL-MCNC: <0.1 MG/DL — SIGNIFICANT CHANGE UP (ref 0–0.3)
BILIRUB INDIRECT FLD-MCNC: >0.1 MG/DL — LOW (ref 0.2–1)
BILIRUB SERPL-MCNC: 0.2 MG/DL — SIGNIFICANT CHANGE UP (ref 0.2–1.2)
BUN SERPL-MCNC: 21 MG/DL — SIGNIFICANT CHANGE UP (ref 7–23)
CALCIUM SERPL-MCNC: 8.5 MG/DL — SIGNIFICANT CHANGE UP (ref 8.4–10.5)
CHLORIDE SERPL-SCNC: 92 MMOL/L — LOW (ref 96–108)
CO2 SERPL-SCNC: 20 MMOL/L — LOW (ref 22–31)
CREAT SERPL-MCNC: 1.14 MG/DL — SIGNIFICANT CHANGE UP (ref 0.5–1.3)
CREAT SERPL-MCNC: 1.25 MG/DL — SIGNIFICANT CHANGE UP (ref 0.5–1.3)
GLUCOSE SERPL-MCNC: 186 MG/DL — HIGH (ref 70–99)
GRAM STN FLD: SIGNIFICANT CHANGE UP
HCT VFR BLD CALC: 31.6 % — LOW (ref 39–50)
HGB BLD-MCNC: 11 G/DL — LOW (ref 13–17)
INR BLD: 1.19 RATIO — HIGH (ref 0.88–1.16)
MCHC RBC-ENTMCNC: 32.9 PG — SIGNIFICANT CHANGE UP (ref 27–34)
MCHC RBC-ENTMCNC: 34.8 GM/DL — SIGNIFICANT CHANGE UP (ref 32–36)
MCV RBC AUTO: 94.6 FL — SIGNIFICANT CHANGE UP (ref 80–100)
NRBC # BLD: 0 /100 WBCS — SIGNIFICANT CHANGE UP (ref 0–0)
PLATELET # BLD AUTO: 260 K/UL — SIGNIFICANT CHANGE UP (ref 150–400)
POTASSIUM SERPL-MCNC: 4.5 MMOL/L — SIGNIFICANT CHANGE UP (ref 3.5–5.3)
POTASSIUM SERPL-SCNC: 4.5 MMOL/L — SIGNIFICANT CHANGE UP (ref 3.5–5.3)
PROT SERPL-MCNC: 7.2 G/DL — SIGNIFICANT CHANGE UP (ref 6–8.3)
PROTHROM AB SERPL-ACNC: 14.2 SEC — HIGH (ref 10.6–13.6)
RBC # BLD: 3.34 M/UL — LOW (ref 4.2–5.8)
RBC # FLD: 13.3 % — SIGNIFICANT CHANGE UP (ref 10.3–14.5)
SODIUM SERPL-SCNC: 126 MMOL/L — LOW (ref 135–145)
SPECIMEN SOURCE: SIGNIFICANT CHANGE UP
WBC # BLD: 4.26 K/UL — SIGNIFICANT CHANGE UP (ref 3.8–10.5)
WBC # FLD AUTO: 4.26 K/UL — SIGNIFICANT CHANGE UP (ref 3.8–10.5)

## 2021-12-29 PROCEDURE — 99232 SBSQ HOSP IP/OBS MODERATE 35: CPT

## 2021-12-29 RX ORDER — ACETAMINOPHEN 500 MG
650 TABLET ORAL ONCE
Refills: 0 | Status: COMPLETED | OUTPATIENT
Start: 2021-12-29 | End: 2021-12-29

## 2021-12-29 RX ORDER — LANOLIN ALCOHOL/MO/W.PET/CERES
3 CREAM (GRAM) TOPICAL ONCE
Refills: 0 | Status: COMPLETED | OUTPATIENT
Start: 2021-12-29 | End: 2021-12-29

## 2021-12-29 RX ORDER — QUETIAPINE FUMARATE 200 MG/1
25 TABLET, FILM COATED ORAL AT BEDTIME
Refills: 0 | Status: DISCONTINUED | OUTPATIENT
Start: 2021-12-29 | End: 2022-01-06

## 2021-12-29 RX ORDER — INSULIN GLARGINE 100 [IU]/ML
5 INJECTION, SOLUTION SUBCUTANEOUS AT BEDTIME
Refills: 0 | Status: DISCONTINUED | OUTPATIENT
Start: 2021-12-29 | End: 2022-01-03

## 2021-12-29 RX ORDER — DULOXETINE HYDROCHLORIDE 30 MG/1
20 CAPSULE, DELAYED RELEASE ORAL DAILY
Refills: 0 | Status: DISCONTINUED | OUTPATIENT
Start: 2021-12-29 | End: 2022-01-06

## 2021-12-29 RX ADMIN — INSULIN GLARGINE 5 UNIT(S): 100 INJECTION, SOLUTION SUBCUTANEOUS at 21:56

## 2021-12-29 RX ADMIN — BUDESONIDE AND FORMOTEROL FUMARATE DIHYDRATE 2 PUFF(S): 160; 4.5 AEROSOL RESPIRATORY (INHALATION) at 08:08

## 2021-12-29 RX ADMIN — Medication 650 MILLIGRAM(S): at 15:26

## 2021-12-29 RX ADMIN — Medication 25 MICROGRAM(S): at 06:00

## 2021-12-29 RX ADMIN — Medication 650 MILLIGRAM(S): at 21:36

## 2021-12-29 RX ADMIN — ENOXAPARIN SODIUM 40 MILLIGRAM(S): 100 INJECTION SUBCUTANEOUS at 12:21

## 2021-12-29 RX ADMIN — Medication 2: at 12:21

## 2021-12-29 RX ADMIN — Medication 1: at 16:59

## 2021-12-29 RX ADMIN — SIMVASTATIN 20 MILLIGRAM(S): 20 TABLET, FILM COATED ORAL at 21:56

## 2021-12-29 RX ADMIN — Medication 100 MILLIGRAM(S): at 06:01

## 2021-12-29 RX ADMIN — BUDESONIDE AND FORMOTEROL FUMARATE DIHYDRATE 2 PUFF(S): 160; 4.5 AEROSOL RESPIRATORY (INHALATION) at 21:57

## 2021-12-29 RX ADMIN — QUETIAPINE FUMARATE 25 MILLIGRAM(S): 200 TABLET, FILM COATED ORAL at 22:01

## 2021-12-29 RX ADMIN — Medication 81 MILLIGRAM(S): at 12:21

## 2021-12-29 RX ADMIN — SODIUM CHLORIDE 1 GRAM(S): 9 INJECTION INTRAMUSCULAR; INTRAVENOUS; SUBCUTANEOUS at 14:57

## 2021-12-29 RX ADMIN — Medication 650 MILLIGRAM(S): at 20:35

## 2021-12-29 RX ADMIN — SODIUM CHLORIDE 1 GRAM(S): 9 INJECTION INTRAMUSCULAR; INTRAVENOUS; SUBCUTANEOUS at 06:00

## 2021-12-29 RX ADMIN — Medication 650 MILLIGRAM(S): at 14:56

## 2021-12-29 RX ADMIN — Medication 100 MILLIGRAM(S): at 16:58

## 2021-12-29 RX ADMIN — Medication 100 MILLIGRAM(S): at 21:56

## 2021-12-29 RX ADMIN — SODIUM CHLORIDE 1 GRAM(S): 9 INJECTION INTRAMUSCULAR; INTRAVENOUS; SUBCUTANEOUS at 21:56

## 2021-12-29 RX ADMIN — Medication 300 MILLIGRAM(S): at 06:00

## 2021-12-29 RX ADMIN — Medication 1: at 08:07

## 2021-12-29 RX ADMIN — TAMSULOSIN HYDROCHLORIDE 0.4 MILLIGRAM(S): 0.4 CAPSULE ORAL at 21:56

## 2021-12-29 RX ADMIN — Medication 3 MILLIGRAM(S): at 21:56

## 2021-12-29 RX ADMIN — REMDESIVIR 500 MILLIGRAM(S): 5 INJECTION INTRAVENOUS at 10:03

## 2021-12-29 NOTE — PROGRESS NOTE ADULT - ASSESSMENT
76 m with DM, HTN, HLD, CAD, hypothyroidism, depression, has had nasal congestion, cough with throat pain while coughing, loose BMs with some GOTTLIEB and confusion, found to have hyponatremia so sent to ER   here afebrile, on RA, normal WBC, hyponatremia to 119  COVID positive, CXR negative  head CT: Abnormal area of low-attenuation involving the right centrum semiovale region. Age-indeterminate lacunar infarct is seen involving the left thalamus.    COVID but s/p vaccination x 3, no fever, clear lungs and on RA, has a chronic cough and GOTTLIEB  diarrhea, seems to have resolved in the hospital, could be due to COVID  hyponatremia  * blood cx negative, f/u the sputum and throat cx but no fever and no WBC, not s/o bacterial infection   * c/w remdesivir for up to a 5 day course, started 12/28, now day 2  * monitor the renal function and LFTs  * pt on RA so no need for dexamethasone  * hyponatremia management as per the primary team    The above assessment and plan was discussed with the primary team    Denise Cintron MD  Pager [phone]  After 5pm and on weekends call [phone]

## 2021-12-29 NOTE — PROGRESS NOTE ADULT - SUBJECTIVE AND OBJECTIVE BOX
Name of Patient : JERRI SIDHU  MRN: [id number]  Date of visit: 21 @ 15:30      Subjective: Patient seen and examined. No new events except as noted. Patient seen this morning.   Patient reports of cough with sputum production and sore throat. Patient was bradycardic this morning on tele. Denies any chest pain or palpitations.    Patient is afebrile and saturating 98% on room air.     REVIEW OF SYSTEMS:    CONSTITUTIONAL: No weakness, fevers or chills  EYES/ENT: + Sore throat   NECK: No pain or stiffness  RESPIRATORY: +Cough with sputum of white color   CARDIOVASCULAR: No chest pain or palpitations  GASTROINTESTINAL: No abdominal or epigastric pain. No nausea, vomiting, or hematemesis; No diarrhea or constipation. No melena or hematochezia.  GENITOURINARY: No dysuria, frequency or hematuria  NEUROLOGICAL: No numbness or weakness  SKIN: No itching, burning, rashes, or lesions   All other review of systems is negative unless indicated above.    MEDICATIONS:  MEDICATIONS  (STANDING):  aspirin enteric coated 81 milliGRAM(s) Oral daily  budesonide 160 MICROgram(s)/formoterol 4.5 MICROgram(s) Inhaler 2 Puff(s) Inhalation two times a day  dextrose 40% Gel 15 Gram(s) Oral once  dextrose 5%. 1000 milliLiter(s) (50 mL/Hr) IV Continuous <Continuous>  dextrose 5%. 1000 milliLiter(s) (100 mL/Hr) IV Continuous <Continuous>  dextrose 50% Injectable 25 Gram(s) IV Push once  dextrose 50% Injectable 12.5 Gram(s) IV Push once  dextrose 50% Injectable 25 Gram(s) IV Push once  diltiazem    milliGRAM(s) Oral daily  enoxaparin Injectable 40 milliGRAM(s) SubCutaneous daily  glucagon  Injectable 1 milliGRAM(s) IntraMuscular once  insulin glargine Injectable (LANTUS) 5 Unit(s) SubCutaneous at bedtime  insulin lispro (ADMELOG) corrective regimen sliding scale   SubCutaneous three times a day before meals  insulin lispro (ADMELOG) corrective regimen sliding scale   SubCutaneous at bedtime  levothyroxine 25 MICROGram(s) Oral daily  metoprolol tartrate 100 milliGRAM(s) Oral two times a day  remdesivir  IVPB   IV Intermittent   remdesivir  IVPB 100 milliGRAM(s) IV Intermittent every 24 hours  simvastatin 20 milliGRAM(s) Oral at bedtime  sodium chloride 1 Gram(s) Oral three times a day  tamsulosin 0.4 milliGRAM(s) Oral at bedtime      PHYSICAL EXAM:  T(C): 36.7 (21 @ 10:55), Max: 37.3 (21 @ 20:28)  HR: 68 (21 @ 15:02) (49 - 68)  BP: 129/70 (21 @ 10:55) (126/75 - 147/73)  RR: 18 (21 @ 10:55) (16 - 18)  SpO2: 98% (21 @ 10:55) (95% - 98%)  Wt(kg): --  I&O's Summary    28 Dec 2021 07:01  -  29 Dec 2021 07:00  --------------------------------------------------------  IN: 360 mL / OUT: 800 mL / NET: -440 mL          Appearance: Normal, speaking in full sentences  	  HEENT:  PERRLA   Lymphatic: No lymphadenopathy   Cardiovascular: Normal S1 S2, no JVD  Respiratory: normal effort , clear  Gastrointestinal:  Soft, Non-tender  Skin: No rashes,  warm to touch  Psychiatry:  Mood & affect appropriate  Musculuskeletal: No edema      All labs, Imaging and EKGs personally reviewed     Culture - Sputum (collected 21 @ 21:43)  Source: .Sputum Sputum  Gram Stain (21 @ 07:10):    Moderate polymorphonuclear leukocytes per low power field    Few Squamous epithelial cells per low power field    Numerous Gram Positive Cocci in Pairs and Chains seen per oil power field          21 @ 07:01  -  21 @ 07:00  --------------------------------------------------------  IN: 360 mL / OUT: 800 mL / NET: -440 mL                          11.0   4.26  )-----------( 260      ( 29 Dec 2021 06:38 )             31.6                   126<L>  |  92<L>  |  21  ----------------------------<  186<H>  4.5   |  20<L>  |  1.14    Ca    8.5      29 Dec 2021 13:12  Phos  3.6       Mg     1.8         TPro  7.2  /  Alb  3.7  /  TBili  0.2  /  DBili  <0.1  /  AST  24  /  ALT  20  /  AlkPhos  60      PT/INR - ( 29 Dec 2021 06:38 )   PT: 14.2 sec;   INR: 1.19 ratio                CARDIAC MARKERS ( 28 Dec 2021 01:07 )  x     / x     / 121 U/L / x     / x                  Urinalysis Basic - ( 27 Dec 2021 16:48 )    Color: Light Yellow / Appearance: Clear / S.011 / pH: x  Gluc: x / Ketone: Negative  / Bili: Negative / Urobili: Negative   Blood: x / Protein: 30 mg/dL / Nitrite: Negative   Leuk Esterase: Negative / RBC: 1 /hpf / WBC 1 /HPF   Sq Epi: x / Non Sq Epi: 0 /hpf / Bacteria: Negative      A1C with Estimated Average Glucose (21 @ 15:20)   A1C with Estimated Average Glucose Result: 7.1    Thyroid Stimulating Hormone, Serum (21 @ 03:59)   Thyroid Stimulating Hormone, Serum: 2.78 uIU/         Name of Patient : JERRI SIDHU  MRN: [id number]  Date of visit: 21 @ 15:30      Subjective: Patient seen and examined. No new events except as noted. Patient seen this morning.   Patient reports of cough with sputum production and sore throat. Patient was bradycardic this morning on tele. Denies any chest pain or palpitations.    Patient is afebrile and saturating 98% on room air.     REVIEW OF SYSTEMS:    CONSTITUTIONAL: No weakness, fevers or chills  EYES/ENT: + Sore throat   NECK: No pain or stiffness  RESPIRATORY: +Cough with sputum of white color   CARDIOVASCULAR: No chest pain or palpitations  GASTROINTESTINAL: No abdominal or epigastric pain. No nausea, vomiting, or hematemesis; No diarrhea or constipation. No melena or hematochezia.  GENITOURINARY: No dysuria, frequency or hematuria  NEUROLOGICAL: No numbness or weakness  SKIN: No itching, burning, rashes, or lesions   All other review of systems is negative unless indicated above.    MEDICATIONS:  MEDICATIONS  (STANDING):  aspirin enteric coated 81 milliGRAM(s) Oral daily  budesonide 160 MICROgram(s)/formoterol 4.5 MICROgram(s) Inhaler 2 Puff(s) Inhalation two times a day  dextrose 40% Gel 15 Gram(s) Oral once  dextrose 5%. 1000 milliLiter(s) (50 mL/Hr) IV Continuous <Continuous>  dextrose 5%. 1000 milliLiter(s) (100 mL/Hr) IV Continuous <Continuous>  dextrose 50% Injectable 25 Gram(s) IV Push once  dextrose 50% Injectable 12.5 Gram(s) IV Push once  dextrose 50% Injectable 25 Gram(s) IV Push once  diltiazem    milliGRAM(s) Oral daily  enoxaparin Injectable 40 milliGRAM(s) SubCutaneous daily  glucagon  Injectable 1 milliGRAM(s) IntraMuscular once  insulin glargine Injectable (LANTUS) 5 Unit(s) SubCutaneous at bedtime  insulin lispro (ADMELOG) corrective regimen sliding scale   SubCutaneous three times a day before meals  insulin lispro (ADMELOG) corrective regimen sliding scale   SubCutaneous at bedtime  levothyroxine 25 MICROGram(s) Oral daily  metoprolol tartrate 100 milliGRAM(s) Oral two times a day  remdesivir  IVPB   IV Intermittent   remdesivir  IVPB 100 milliGRAM(s) IV Intermittent every 24 hours  simvastatin 20 milliGRAM(s) Oral at bedtime  sodium chloride 1 Gram(s) Oral three times a day  tamsulosin 0.4 milliGRAM(s) Oral at bedtime      PHYSICAL EXAM:  T(C): 36.7 (21 @ 10:55), Max: 37.3 (21 @ 20:28)  HR: 68 (21 @ 15:02) (49 - 68)  BP: 129/70 (21 @ 10:55) (126/75 - 147/73)  RR: 18 (21 @ 10:55) (16 - 18)  SpO2: 98% (21 @ 10:55) (95% - 98%)  Wt(kg): --  I&O's Summary    28 Dec 2021 07:01  -  29 Dec 2021 07:00  --------------------------------------------------------  IN: 360 mL / OUT: 800 mL / NET: -440 mL          Appearance: Normal, speaking in full sentences  	  HEENT:  PERRLA   Lymphatic: No lymphadenopathy   Cardiovascular: Normal S1 S2, no JVD  Respiratory: normal effort , clear  Gastrointestinal:  Soft, Non-tender  Skin: No rashes,  warm to touch  Psychiatry:  Mood & affect appropriate  Musculuskeletal: No edema      All labs, Imaging and EKGs personally reviewed     Culture - Sputum (collected 21 @ 21:43)  Source: .Sputum Sputum  Gram Stain (21 @ 07:10):    Moderate polymorphonuclear leukocytes per low power field    Few Squamous epithelial cells per low power field    Numerous Gram Positive Cocci in Pairs and Chains seen per oil power field          21 @ 07:01  -  21 @ 07:00  --------------------------------------------------------  IN: 360 mL / OUT: 800 mL / NET: -440 mL                          11.0   4.26  )-----------( 260      ( 29 Dec 2021 06:38 )             31.6                   126<L>  |  92<L>  |  21  ----------------------------<  186<H>  4.5   |  20<L>  |  1.14    Ca    8.5      29 Dec 2021 13:12  Phos  3.6       Mg     1.8         TPro  7.2  /  Alb  3.7  /  TBili  0.2  /  DBili  <0.1  /  AST  24  /  ALT  20  /  AlkPhos  60      PT/INR - ( 29 Dec 2021 06:38 )   PT: 14.2 sec;   INR: 1.19 ratio                CARDIAC MARKERS ( 28 Dec 2021 01:07 )  x     / x     / 121 U/L / x     / x                  Urinalysis Basic - ( 27 Dec 2021 16:48 )    Color: Light Yellow / Appearance: Clear / S.011 / pH: x  Gluc: x / Ketone: Negative  / Bili: Negative / Urobili: Negative   Blood: x / Protein: 30 mg/dL / Nitrite: Negative   Leuk Esterase: Negative / RBC: 1 /hpf / WBC 1 /HPF   Sq Epi: x / Non Sq Epi: 0 /hpf / Bacteria: Negative      A1C with Estimated Average Glucose (21 @ 15:20)   A1C with Estimated Average Glucose Result: 7.1    Thyroid Stimulating Hormone, Serum (21 @ 03:59)   Thyroid Stimulating Hormone, Serum: 2.78 uIU/    Culture - Blood (21 @ 03:21)   Specimen Source: .Blood Blood-Venous   Culture Results: No growth to date.     Culture - Blood (21 @ 03:21)   Specimen Source: .Blood Blood-Venous   Culture Results: No growth to date.     Culture - Urine (21 @ 19:17)   Specimen Source: Clean Catch Clean Catch (Midstream)   Culture Results: >=3 organisms. Probable collection contamination.          Name of Patient : JERRI SIDHU  MRN: [id number]  Date of visit: 21 @ 15:30      Subjective: Patient seen and examined. No new events except as noted. Patient seen this morning.   Patient reports of cough with sputum production and sore throat. Patient was bradycardic this morning on tele. Denies any chest pain or palpitations.    Patient is afebrile and saturating 98% on room air.     REVIEW OF SYSTEMS:    CONSTITUTIONAL: No weakness, fevers or chills  EYES/ENT: + Sore throat   NECK: No pain or stiffness  RESPIRATORY: +Cough with sputum of white color   CARDIOVASCULAR: No chest pain or palpitations  GASTROINTESTINAL: No abdominal or epigastric pain. No nausea, vomiting, or hematemesis; No diarrhea or constipation. No melena or hematochezia.  GENITOURINARY: No dysuria, frequency or hematuria  NEUROLOGICAL: No numbness or weakness  SKIN: No itching, burning, rashes, or lesions   All other review of systems is negative unless indicated above.    MEDICATIONS:  MEDICATIONS  (STANDING):  aspirin enteric coated 81 milliGRAM(s) Oral daily  budesonide 160 MICROgram(s)/formoterol 4.5 MICROgram(s) Inhaler 2 Puff(s) Inhalation two times a day  dextrose 40% Gel 15 Gram(s) Oral once  dextrose 5%. 1000 milliLiter(s) (50 mL/Hr) IV Continuous <Continuous>  dextrose 5%. 1000 milliLiter(s) (100 mL/Hr) IV Continuous <Continuous>  dextrose 50% Injectable 25 Gram(s) IV Push once  dextrose 50% Injectable 12.5 Gram(s) IV Push once  dextrose 50% Injectable 25 Gram(s) IV Push once  diltiazem    milliGRAM(s) Oral daily  enoxaparin Injectable 40 milliGRAM(s) SubCutaneous daily  glucagon  Injectable 1 milliGRAM(s) IntraMuscular once  insulin glargine Injectable (LANTUS) 5 Unit(s) SubCutaneous at bedtime  insulin lispro (ADMELOG) corrective regimen sliding scale   SubCutaneous three times a day before meals  insulin lispro (ADMELOG) corrective regimen sliding scale   SubCutaneous at bedtime  levothyroxine 25 MICROGram(s) Oral daily  metoprolol tartrate 100 milliGRAM(s) Oral two times a day  remdesivir  IVPB   IV Intermittent   remdesivir  IVPB 100 milliGRAM(s) IV Intermittent every 24 hours  simvastatin 20 milliGRAM(s) Oral at bedtime  sodium chloride 1 Gram(s) Oral three times a day  tamsulosin 0.4 milliGRAM(s) Oral at bedtime      PHYSICAL EXAM:  T(C): 36.7 (21 @ 10:55), Max: 37.3 (21 @ 20:28)  HR: 68 (21 @ 15:02) (49 - 68)  BP: 129/70 (21 @ 10:55) (126/75 - 147/73)  RR: 18 (21 @ 10:55) (16 - 18)  SpO2: 98% (21 @ 10:55) (95% - 98%)  Wt(kg): --  I&O's Summary    28 Dec 2021 07:01  -  29 Dec 2021 07:00  --------------------------------------------------------  IN: 360 mL / OUT: 800 mL / NET: -440 mL          Appearance: Normal, speaking in full sentences  	  HEENT:  Non- exudative throat  Lymphatic: No lymphadenopathy   Cardiovascular: Normal S1 S2, no JVD  Respiratory: normal effort , clear  Gastrointestinal:  Soft, Non-tender  Skin: No rashes,  warm to touch  Psychiatry:  Mood & affect appropriate  Musculuskeletal: No edema      All labs, Imaging and EKGs personally reviewed     Culture - Sputum (collected 21 @ 21:43)  Source: .Sputum Sputum  Gram Stain (21 @ 07:10):    Moderate polymorphonuclear leukocytes per low power field    Few Squamous epithelial cells per low power field    Numerous Gram Positive Cocci in Pairs and Chains seen per oil power field          21 @ 07:01  -  21 @ 07:00  --------------------------------------------------------  IN: 360 mL / OUT: 800 mL / NET: -440 mL                          11.0   4.26  )-----------( 260      ( 29 Dec 2021 06:38 )             31.6                   126<L>  |  92<L>  |  21  ----------------------------<  186<H>  4.5   |  20<L>  |  1.14    Ca    8.5      29 Dec 2021 13:12  Phos  3.6       Mg     1.8         TPro  7.2  /  Alb  3.7  /  TBili  0.2  /  DBili  <0.1  /  AST  24  /  ALT  20  /  AlkPhos  60      PT/INR - ( 29 Dec 2021 06:38 )   PT: 14.2 sec;   INR: 1.19 ratio                CARDIAC MARKERS ( 28 Dec 2021 01:07 )  x     / x     / 121 U/L / x     / x                  Urinalysis Basic - ( 27 Dec 2021 16:48 )    Color: Light Yellow / Appearance: Clear / S.011 / pH: x  Gluc: x / Ketone: Negative  / Bili: Negative / Urobili: Negative   Blood: x / Protein: 30 mg/dL / Nitrite: Negative   Leuk Esterase: Negative / RBC: 1 /hpf / WBC 1 /HPF   Sq Epi: x / Non Sq Epi: 0 /hpf / Bacteria: Negative      A1C with Estimated Average Glucose (21 @ 15:20)   A1C with Estimated Average Glucose Result: 7.1    Thyroid Stimulating Hormone, Serum (21 @ 03:59)   Thyroid Stimulating Hormone, Serum: 2.78 uIU/    Culture - Blood (21 @ 03:21)   Specimen Source: .Blood Blood-Venous   Culture Results: No growth to date.     Culture - Blood (21 @ 03:21)   Specimen Source: .Blood Blood-Venous   Culture Results: No growth to date.     Culture - Urine (21 @ 19:17)   Specimen Source: Clean Catch Clean Catch (Midstream)   Culture Results: >=3 organisms. Probable collection contamination.

## 2021-12-29 NOTE — PROGRESS NOTE ADULT - ASSESSMENT
Patient is a 77 y/o M--with a PMHx of hypothyroidism on Synthroid, essential HTN on Lopressor, aldactone, depression on Seroquel and Cymbalta, CAD with S/P cardiac catheterization by Dr. Daryl Patrick at [facility] in Nov 2019, type 2 DM on Januvia and bedtime Levemir, with the patient followed by his PCP above and apparently has had upper nasal congestion and loose BM for the past 3 weeks, with the son concerned with increased confusion and decreased ADL and exertional dyspnoea for the past 3 weeks, but worsening for the past few days, with the patient with an apparent outpatient lab assay with hyponatremia and was sent to the ER by his PCP, apparently with prior episodes of hyponatremia, under the presumption of the hyponatremia as the primary mechanism of the confusional state.  Patient had just received his booster dose of the COVID-10 Moderna 7 days ago.   No fever, no chills, no rigors.  NO sore throat but with upper nasal congestion as above.  NO neck pain.  No dyspnoea at present.  NO chest pain/pressure.  NO HA, no focal weakness.  No diaphoresis.    #Bradycardia   --Patient with bradycardia on tele   --At time of visit, patient was asymptomatic   --Continue to monitor on Tele and close monitoring of patient     #COVID-19  --CTT head ordered--NO acute bleed or mass.     --Presently NOT requiring supplemental O2 to warrant Decadron.     --Patient is currently saturating 98% on room air  --Discussed with ID attending Dr. Cintron, patient is not in need of Dexamethasone   --Per ID, can continue with Remdesivir for now -- monitor renal function and LFTs   --Inflammatory indices ordered   --Continue with Symbicort 160  --Monitor patient clinically O2 saturation, vital signs and temperature curve       #Hyponatremia.   --CT head   --Continue with Fluid restriction.   --Monitor Na+ and daily weights.    --Renal evaluation, F/U Recommendations  --Started on Salt tab (Sodium chloride)  per renal   --Continue to hold aldactone  --Cymbalta, and Seroquel can be resumed per nephrology; monitor QTC   --Monitor BMP daily -- Na noted to be 126 today 12/29; F/U nephrology recommendations        #CAD (coronary artery disease).   --Upgraded to telemetry to exclude cardiac equivalent.  --Continue with Aspirin   --Continue with Simvastatin   --Cardio consult PRN   --Check ECHO -- EF of 65%       #Type 2 diabetes mellitus.   --Continue with ISS for now  --Patient was on bedtime Lantus at home-- Patient does not know lantus dose    --Continue to monitor FS and serum glucose levels  --Avoid Hypo/Hyperglycemia  --Checking A1C -- 7.1  --Endocrinology consultation PRN       #Productive cough  --Patient reporting productive cough with white sputum  --Will check sputum culture-- Moderate polymorphonuclear leukocytes per low power field, Few Squamous epithelial cells per low power field, Numerous Gram Positive Cocci in Pairs and Chains seen per oil power field  --F/U ID       #Sore throat  --Patient endorsing sore throat  --Will check Throat culture (ordered) -- Results currently pending   --Tylenol for pain control       #HTN  --Monitor Vital Signs and BP closely  --Continue with home medication Lopressor for now  --Aldactone is on hold as may cause HypoNa  --Monitor BP       #Loose bowels  --Per patient experienced loose bowel outpatient   --If occurs and of watery consistency check for C.Diff   --Likely due to COVID  --Continue to monitor       #Depression  --Per nephrology can resume home medications-- Seroquel and Cymbalta that were previously on hold due to hyponatremia   --Continue to monitor patient   --Psych consult PRN     #Hypothyroidism   --TSH on this admission-- WNL at 2.78  --Continue with home dose Synthroid     #Need for prophylactic measure.   -- DVT prophylaxis with Lovenox 40    --Patient will need outpatient follow up for CT Head findings  Patient is a 77 y/o M--with a PMHx of hypothyroidism on Synthroid, essential HTN on Lopressor, aldactone, depression on Seroquel and Cymbalta, CAD with S/P cardiac catheterization by Dr. Daryl Patrick at [facility] in Nov 2019, type 2 DM on Januvia and bedtime Levemir, with the patient followed by his PCP above and apparently has had upper nasal congestion and loose BM for the past 3 weeks, with the son concerned with increased confusion and decreased ADL and exertional dyspnoea for the past 3 weeks, but worsening for the past few days, with the patient with an apparent outpatient lab assay with hyponatremia and was sent to the ER by his PCP, apparently with prior episodes of hyponatremia, under the presumption of the hyponatremia as the primary mechanism of the confusional state.  Patient had just received his booster dose of the COVID-10 Moderna 7 days ago.   No fever, no chills, no rigors.  NO sore throat but with upper nasal congestion as above.  NO neck pain.  No dyspnoea at present.  NO chest pain/pressure.  NO HA, no focal weakness.  No diaphoresis.    #Bradycardia   --Patient with bradycardia on tele   --At time of visit, patient was asymptomatic   --Continue to monitor on Tele and close monitoring of patient     #COVID-19  --CTT head ordered--NO acute bleed or mass.     --Presently NOT requiring supplemental O2 to warrant Decadron.     --Patient is currently saturating 98% on room air  --Discussed with ID attending Dr. Cintron, patient is not in need of Dexamethasone   --Per ID, can continue with Remdesivir for now -- monitor renal function and LFTs   --Monitor inflammatory markers   --Continue with Symbicort 160  --Monitor patient clinically O2 saturation, vital signs and temperature curve       #Hyponatremia.   --CT head   --Continue with Fluid restriction.   --Monitor Na+ and daily weights.    --Renal evaluation, F/U Recommendations  --Started on Salt tab (Sodium chloride)  per renal   --Continue to hold aldactone  --Cymbalta, and Seroquel can be resumed per nephrology; monitor QTC   --Monitor BMP daily -- Na noted to be 126 today 12/29; F/U nephrology recommendations        #CAD (coronary artery disease).   --Upgraded to telemetry to exclude cardiac equivalent.  --Continue with Aspirin   --Continue with Simvastatin   --Cardio consult PRN   --Check ECHO -- EF of 65%       #Type 2 diabetes mellitus.   --Continue with ISS for now  --Patient was on bedtime Lantus at home-- Patient does not know lantus dose    --Continue to monitor FS and serum glucose levels  --Avoid Hypo/Hyperglycemia  --Checking A1C -- 7.1  --Endocrinology consultation PRN       #Productive cough  --Patient reporting productive cough with white sputum  --Will check sputum culture-- Moderate polymorphonuclear leukocytes per low power field, Few Squamous epithelial cells per low power field, Numerous Gram Positive Cocci in Pairs and Chains seen per oil power field  --F/U ID       #Sore throat  --Patient endorsing sore throat  --Will check Throat culture (ordered) -- Results currently pending   --Tylenol for pain control       #HTN  --Monitor Vital Signs and BP closely  --Continue with home medication Lopressor for now  --Aldactone is on hold as may cause HypoNa  --Monitor BP       #Loose bowels  --Per patient experienced loose bowel outpatient   --If occurs and of watery consistency check for C.Diff   --Likely due to COVID  --Blood culture X 2 with NGTD  --Urine culture with probable contamination   --Continue to monitor       #Depression  --Per nephrology can resume home medications-- Seroquel and Cymbalta that were previously on hold due to hyponatremia   --Continue to monitor patient   --Psych consult PRN     #Hypothyroidism   --TSH on this admission-- WNL at 2.78  --Continue with home dose Synthroid     #Need for prophylactic measure.   -- DVT prophylaxis with Lovenox 40    --Patient will need outpatient follow up for CT Head findings  Patient is a 77 y/o M--with a PMHx of hypothyroidism on Synthroid, essential HTN on Lopressor, aldactone, depression on Seroquel and Cymbalta, CAD with S/P cardiac catheterization by Dr. Daryl Patrick at [facility] in Nov 2019, type 2 DM on Januvia and bedtime Levemir, with the patient followed by his PCP above and apparently has had upper nasal congestion and loose BM for the past 3 weeks, with the son concerned with increased confusion and decreased ADL and exertional dyspnoea for the past 3 weeks, but worsening for the past few days, with the patient with an apparent outpatient lab assay with hyponatremia and was sent to the ER by his PCP, apparently with prior episodes of hyponatremia, under the presumption of the hyponatremia as the primary mechanism of the confusional state.  Patient had just received his booster dose of the COVID-10 Moderna 7 days ago.   No fever, no chills, no rigors.  NO sore throat but with upper nasal congestion as above.  NO neck pain.  No dyspnoea at present.  NO chest pain/pressure.  NO HA, no focal weakness.  No diaphoresis.    #Bradycardia   --Patient with bradycardia on tele   --At time of visit, patient was asymptomatic   --Continue to monitor on Tele and close monitoring of patient     #COVID-19  --CT head ordered--NO acute bleed or mass.     --Presently NOT requiring supplemental O2 to warrant Decadron.     --Patient is currently saturating 98% on room air  --Discussed with ID attending Dr. Cintron, patient is not in need of Dexamethasone   --Per ID, can continue with Remdesivir (day 2) for a total of 5 days -- monitor renal function and LFTs   --Monitor inflammatory markers   --Continue with Symbicort 160  --Monitor patient clinically O2 saturation, vital signs and temperature curve       #Hyponatremia.   --CT head   --Continue with Fluid restriction.   --Monitor Na+ and daily weights.    --Renal evaluation, F/U Recommendations  --Started on Salt tab (Sodium chloride)  per renal   --Continue to hold aldactone  --Cymbalta, and Seroquel can be resumed per nephrology; monitor QTC   --Monitor BMP daily -- Na noted to be 126 today 12/29; F/U nephrology recommendations        #CAD (coronary artery disease).   --Upgraded to telemetry to exclude cardiac equivalent.  --Continue with Aspirin   --Continue with Simvastatin   --Cardio consult PRN   --Check ECHO -- EF of 65%       #Type 2 diabetes mellitus.   --Continue with ISS for now  --Patient was on bedtime Lantus at home  --Continue to monitor FS and serum glucose levels  --Avoid Hypo/Hyperglycemia  --Checking A1C -- 7.1  --Patient will need outpatient follow up for DM management and control   --Endocrinology consultation PRN       #Productive cough  --Patient reporting productive cough with white sputum  --Will check sputum culture-- Moderate polymorphonuclear leukocytes per low power field, Few Squamous epithelial cells per low power field, Numerous Gram Positive Cocci in Pairs and Chains seen per oil power field  --F/U ID--Awaiting throat culture results       #Sore throat  --Patient endorsing sore throat  --Will check Throat culture (ordered) -- Results currently pending   --Tylenol for pain control   --Will hold off on Abx for now until throat culture results       #HTN  --Monitor Vital Signs and BP closely  --Continue with home medication Lopressor for now  --Aldactone is on hold as may cause HypoNa  --Monitor BP       #Loose bowels  --Per patient experienced loose bowel outpatient   --If occurs and of watery consistency check for C.Diff   --Likely due to COVID  --Blood culture X 2 with NGTD  --Urine culture with probable contamination and patient is currently asymptomatic  --Continue to monitor       #Depression  --Per nephrology can resume home medications-- Seroquel and Cymbalta that were previously on hold due to hyponatremia   --Continue to monitor patient and monitor QTC   --Psych consult PRN     #Hypothyroidism   --TSH on this admission-- WNL at 2.78  --Continue with home dose Synthroid     #Need for prophylactic measure.   -- DVT prophylaxis with Lovenox 40    --Patient will need outpatient follow up for CT Head findings

## 2021-12-29 NOTE — PROGRESS NOTE ADULT - SUBJECTIVE AND OBJECTIVE BOX
Follow Up:  COVID    Interval History: pt afebrile and on RA, negative blood cx and urine cx >3 organsims     ROS:      All other systems negative    Constitutional: no fever, no chills  ENT:  some sore throat, no rhinorrhea  Cardiovascular:  no chest pain, no palpitation  Respiratory:  no SOB, no cough  GI:  no abd pain, no vomiting, no diarrhea  urinary: no dysuria, no hematuria, no flank pain  musculoskeletal:  no joint pain, no joint swelling  skin:  no rash  neurology:  no headache, no seizure      Allergies  No Known Allergies        ANTIMICROBIALS:  remdesivir  IVPB    remdesivir  IVPB 100 every 24 hours      OTHER MEDS:  acetaminophen     Tablet .. 650 milliGRAM(s) Oral every 4 hours PRN  aspirin enteric coated 81 milliGRAM(s) Oral daily  budesonide 160 MICROgram(s)/formoterol 4.5 MICROgram(s) Inhaler 2 Puff(s) Inhalation two times a day  dextrose 40% Gel 15 Gram(s) Oral once  dextrose 5%. 1000 milliLiter(s) IV Continuous <Continuous>  dextrose 5%. 1000 milliLiter(s) IV Continuous <Continuous>  dextrose 50% Injectable 25 Gram(s) IV Push once  dextrose 50% Injectable 12.5 Gram(s) IV Push once  dextrose 50% Injectable 25 Gram(s) IV Push once  diltiazem    milliGRAM(s) Oral daily  enoxaparin Injectable 40 milliGRAM(s) SubCutaneous daily  glucagon  Injectable 1 milliGRAM(s) IntraMuscular once  insulin glargine Injectable (LANTUS) 5 Unit(s) SubCutaneous at bedtime  insulin lispro (ADMELOG) corrective regimen sliding scale   SubCutaneous three times a day before meals  insulin lispro (ADMELOG) corrective regimen sliding scale   SubCutaneous at bedtime  levothyroxine 25 MICROGram(s) Oral daily  metoprolol tartrate 100 milliGRAM(s) Oral two times a day  simvastatin 20 milliGRAM(s) Oral at bedtime  sodium chloride 1 Gram(s) Oral three times a day  tamsulosin 0.4 milliGRAM(s) Oral at bedtime      Vital Signs Last 24 Hrs  T(C): 36.7 (29 Dec 2021 10:55), Max: 37.3 (28 Dec 2021 20:28)  T(F): 98.1 (29 Dec 2021 10:55), Max: 99.2 (28 Dec 2021 20:28)  HR: 68 (29 Dec 2021 15:02) (49 - 68)  BP: 129/70 (29 Dec 2021 10:55) (126/75 - 147/73)  BP(mean): --  RR: 18 (29 Dec 2021 10:55) (16 - 18)  SpO2: 98% (29 Dec 2021 10:55) (95% - 98%)    Physical Exam:  General:    NAD,  non toxic  Cardio:     regular S1, S2  Respiratory:    clear b/l,    no wheezing  abd:     soft,   BS +,   no tenderness  :   no CVAT,  no suprapubic tenderness,   no  shoemaker  Musculoskeletal:   no joint swelling  vascular: no phlebitis  Skin:    no rash                          11.0   4.26  )-----------( 260      ( 29 Dec 2021 06:38 )             31.6           126<L>  |  92<L>  |  21  ----------------------------<  186<H>  4.5   |  20<L>  |  1.14    Ca    8.5      29 Dec 2021 13:12  Phos  3.6       Mg     1.8         TPro  7.2  /  Alb  3.7  /  TBili  0.2  /  DBili  <0.1  /  AST  24  /  ALT  20  /  AlkPhos  60        Urinalysis Basic - ( 27 Dec 2021 16:48 )    Color: Light Yellow / Appearance: Clear / S.011 / pH: x  Gluc: x / Ketone: Negative  / Bili: Negative / Urobili: Negative   Blood: x / Protein: 30 mg/dL / Nitrite: Negative   Leuk Esterase: Negative / RBC: 1 /hpf / WBC 1 /HPF   Sq Epi: x / Non Sq Epi: 0 /hpf / Bacteria: Negative        MICROBIOLOGY:  v  .Sputum Sputum  21 --  --    Moderate polymorphonuclear leukocytes per low power field  Few Squamous epithelial cells per low power field  Numerous Gram Positive Cocci in Pairs and Chains seen per oil power field      .Blood Blood-Venous  21   No growth to date.  --  --      Clean Catch Clean Catch (Midstream)  21   >=3 organisms. Probable collection contamination.  --  --                RADIOLOGY:  Images independently visualized and reviewed personally, findings as below  < from: CT Head No Cont (21 @ 21:58) >  IMPRESSION: Abnormal area of low-attenuation involving the right centrum   semiovale region.    Age-indeterminate lacunar infarct is seen involving the left thalamus.    < from: Xray Chest 1 View- PORTABLE-Urgent (Xray Chest 1 View- PORTABLE-Urgent .) (21 @ 15:26) >  IMPRESSION:  Clear lungs.    < end of copied text >     hair removal not indicated

## 2021-12-30 LAB
ANION GAP SERPL CALC-SCNC: 13 MMOL/L — SIGNIFICANT CHANGE UP (ref 5–17)
BUN SERPL-MCNC: 26 MG/DL — HIGH (ref 7–23)
CALCIUM SERPL-MCNC: 8.5 MG/DL — SIGNIFICANT CHANGE UP (ref 8.4–10.5)
CHLORIDE SERPL-SCNC: 94 MMOL/L — LOW (ref 96–108)
CO2 SERPL-SCNC: 19 MMOL/L — LOW (ref 22–31)
CORTIS AM PEAK SERPL-MCNC: 19.9 UG/DL — HIGH (ref 6–18.4)
CREAT SERPL-MCNC: 1.17 MG/DL — SIGNIFICANT CHANGE UP (ref 0.5–1.3)
CULTURE RESULTS: SIGNIFICANT CHANGE UP
CULTURE RESULTS: SIGNIFICANT CHANGE UP
GLUCOSE SERPL-MCNC: 186 MG/DL — HIGH (ref 70–99)
INR BLD: 1.25 RATIO — HIGH (ref 0.88–1.16)
OSMOLALITY UR: 572 MOS/KG — SIGNIFICANT CHANGE UP (ref 300–900)
POTASSIUM SERPL-MCNC: 4.3 MMOL/L — SIGNIFICANT CHANGE UP (ref 3.5–5.3)
POTASSIUM SERPL-SCNC: 4.3 MMOL/L — SIGNIFICANT CHANGE UP (ref 3.5–5.3)
PROTHROM AB SERPL-ACNC: 14.8 SEC — HIGH (ref 10.6–13.6)
SODIUM SERPL-SCNC: 126 MMOL/L — LOW (ref 135–145)
SODIUM UR-SCNC: 49 MMOL/L — SIGNIFICANT CHANGE UP
SPECIMEN SOURCE: SIGNIFICANT CHANGE UP
SPECIMEN SOURCE: SIGNIFICANT CHANGE UP
URATE SERPL-MCNC: 5 MG/DL — SIGNIFICANT CHANGE UP (ref 3.4–8.8)

## 2021-12-30 PROCEDURE — 99232 SBSQ HOSP IP/OBS MODERATE 35: CPT

## 2021-12-30 RX ORDER — SODIUM CHLORIDE 9 MG/ML
1 INJECTION INTRAMUSCULAR; INTRAVENOUS; SUBCUTANEOUS THREE TIMES A DAY
Refills: 0 | Status: DISCONTINUED | OUTPATIENT
Start: 2021-12-31 | End: 2022-01-06

## 2021-12-30 RX ORDER — TOLVAPTAN 15 MG/1
15 TABLET ORAL ONCE
Refills: 0 | Status: COMPLETED | OUTPATIENT
Start: 2021-12-30 | End: 2021-12-30

## 2021-12-30 RX ADMIN — Medication 2: at 11:38

## 2021-12-30 RX ADMIN — Medication 81 MILLIGRAM(S): at 11:36

## 2021-12-30 RX ADMIN — Medication 650 MILLIGRAM(S): at 02:11

## 2021-12-30 RX ADMIN — BUDESONIDE AND FORMOTEROL FUMARATE DIHYDRATE 2 PUFF(S): 160; 4.5 AEROSOL RESPIRATORY (INHALATION) at 21:12

## 2021-12-30 RX ADMIN — BUDESONIDE AND FORMOTEROL FUMARATE DIHYDRATE 2 PUFF(S): 160; 4.5 AEROSOL RESPIRATORY (INHALATION) at 10:03

## 2021-12-30 RX ADMIN — Medication 650 MILLIGRAM(S): at 13:49

## 2021-12-30 RX ADMIN — SIMVASTATIN 20 MILLIGRAM(S): 20 TABLET, FILM COATED ORAL at 21:12

## 2021-12-30 RX ADMIN — Medication 650 MILLIGRAM(S): at 02:41

## 2021-12-30 RX ADMIN — TAMSULOSIN HYDROCHLORIDE 0.4 MILLIGRAM(S): 0.4 CAPSULE ORAL at 21:12

## 2021-12-30 RX ADMIN — REMDESIVIR 500 MILLIGRAM(S): 5 INJECTION INTRAVENOUS at 11:36

## 2021-12-30 RX ADMIN — Medication 100 MILLIGRAM(S): at 17:37

## 2021-12-30 RX ADMIN — SODIUM CHLORIDE 1 GRAM(S): 9 INJECTION INTRAMUSCULAR; INTRAVENOUS; SUBCUTANEOUS at 05:30

## 2021-12-30 RX ADMIN — Medication 300 MILLIGRAM(S): at 11:35

## 2021-12-30 RX ADMIN — TOLVAPTAN 15 MILLIGRAM(S): 15 TABLET ORAL at 13:18

## 2021-12-30 RX ADMIN — Medication 100 MILLIGRAM(S): at 08:03

## 2021-12-30 RX ADMIN — DULOXETINE HYDROCHLORIDE 20 MILLIGRAM(S): 30 CAPSULE, DELAYED RELEASE ORAL at 11:36

## 2021-12-30 RX ADMIN — ENOXAPARIN SODIUM 40 MILLIGRAM(S): 100 INJECTION SUBCUTANEOUS at 11:38

## 2021-12-30 RX ADMIN — INSULIN GLARGINE 5 UNIT(S): 100 INJECTION, SOLUTION SUBCUTANEOUS at 21:53

## 2021-12-30 RX ADMIN — Medication 2: at 08:04

## 2021-12-30 RX ADMIN — Medication 3: at 17:33

## 2021-12-30 RX ADMIN — Medication 25 MICROGRAM(S): at 05:30

## 2021-12-30 RX ADMIN — Medication 650 MILLIGRAM(S): at 13:19

## 2021-12-30 RX ADMIN — Medication 100 MILLIGRAM(S): at 00:58

## 2021-12-30 RX ADMIN — Medication 100 MILLIGRAM(S): at 17:33

## 2021-12-30 RX ADMIN — QUETIAPINE FUMARATE 25 MILLIGRAM(S): 200 TABLET, FILM COATED ORAL at 21:12

## 2021-12-30 NOTE — PROGRESS NOTE ADULT - ASSESSMENT
6 m with DM, HTN, HLD, CAD, hypothyroidism, depression, has had nasal congestion, cough with throat pain while coughing, loose BMs with some GOTTLIEB and confusion, found to have hyponatremia so sent to ER   here afebrile, on RA, normal WBC, hyponatremia to 119  COVID positive, CXR negative  head CT: Abnormal area of low-attenuation involving the right centrum semiovale region. Age-indeterminate lacunar infarct is seen involving the left thalamus.    COVID but s/p vaccination x 3, no fever, clear lungs and on RA, has a chronic cough and GOTTLIEB  diarrhea, seems to have resolved in the hospital, could be due to COVID  hyponatremia  * negative blood cx sputum and throat cx, no fever and no WBC, not s/o bacterial infection   * c/w remdesivir for up to a 5 day course, started 12/28, now day 3  * monitor the renal function and LFTs  * pt on RA so no need for dexamethasone  * hyponatremia management as per the primary team  * will sign off, please call with questions    The above assessment and plan was discussed with the primary team    Denise Cintron MD  Pager [phone]  After 5pm and on weekends call [phone]

## 2021-12-30 NOTE — PROGRESS NOTE ADULT - ASSESSMENT
76y Male with history of HTN, DM, depression presents with confusion. Nephrology consulted for hyponatremia.    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 Serum Na stable this morning. Will give tolvaptan 15 mg PO X 1 dose today (hold sodium chloride 1 gram TID but will resume in AM). Continue with 1L FR and glucerna to increase osmolar intake. F/U AM cortisol. Repeat urine sodium and urine osm. Monitor serum Na.    2) HTN: BP controlled. Continue with current medications. Monitor BP.    3) LE edema: Patient appears euvolemic and TTE unremarkable. Unclear why patient on aldactone. Would regardless hold for now. Monitor UO.    4) Depression: On cymbalta and seroquel. Both medications have been reported to cause hyponatremia however frequency is generally low. No renal objection to continue medications as needed for depression.    [facility] NEPHROLOGY  Tr Begum M.D.  Kelechi Chaudhry D.O.  Mariam Bell M.D.  Radha Burnett, DIANA, ANP-C    Telephone: [phone]  Facsimile: [phone]    [address] [place] [address]

## 2021-12-30 NOTE — PROGRESS NOTE ADULT - SUBJECTIVE AND OBJECTIVE BOX
Name of Patient : JERRI SIDHU  MRN: [id number]  Date of visit: 12-30-21 @ 14:08      Subjective: Patient seen and examined. No new events except as noted.     REVIEW OF SYSTEMS:    CONSTITUTIONAL: No weakness, fevers or chills  EYES/ENT: No visual changes;  No vertigo or throat pain   NECK: No pain or stiffness  RESPIRATORY: No cough, wheezing, hemoptysis; No shortness of breath  CARDIOVASCULAR: No chest pain or palpitations  GASTROINTESTINAL: No abdominal or epigastric pain. No nausea, vomiting, or hematemesis; No diarrhea or constipation. No melena or hematochezia.  GENITOURINARY: No dysuria, frequency or hematuria  NEUROLOGICAL: No numbness or weakness  SKIN: No itching, burning, rashes, or lesions   All other review of systems is negative unless indicated above.    MEDICATIONS:  MEDICATIONS  (STANDING):  aspirin enteric coated 81 milliGRAM(s) Oral daily  budesonide 160 MICROgram(s)/formoterol 4.5 MICROgram(s) Inhaler 2 Puff(s) Inhalation two times a day  dextrose 40% Gel 15 Gram(s) Oral once  dextrose 5%. 1000 milliLiter(s) (50 mL/Hr) IV Continuous <Continuous>  dextrose 5%. 1000 milliLiter(s) (100 mL/Hr) IV Continuous <Continuous>  dextrose 50% Injectable 25 Gram(s) IV Push once  dextrose 50% Injectable 12.5 Gram(s) IV Push once  dextrose 50% Injectable 25 Gram(s) IV Push once  diltiazem    milliGRAM(s) Oral daily  DULoxetine 20 milliGRAM(s) Oral daily  enoxaparin Injectable 40 milliGRAM(s) SubCutaneous daily  glucagon  Injectable 1 milliGRAM(s) IntraMuscular once  insulin glargine Injectable (LANTUS) 5 Unit(s) SubCutaneous at bedtime  insulin lispro (ADMELOG) corrective regimen sliding scale   SubCutaneous three times a day before meals  insulin lispro (ADMELOG) corrective regimen sliding scale   SubCutaneous at bedtime  levothyroxine 25 MICROGram(s) Oral daily  metoprolol tartrate 100 milliGRAM(s) Oral two times a day  QUEtiapine 25 milliGRAM(s) Oral at bedtime  remdesivir  IVPB   IV Intermittent   remdesivir  IVPB 100 milliGRAM(s) IV Intermittent every 24 hours  simvastatin 20 milliGRAM(s) Oral at bedtime  tamsulosin 0.4 milliGRAM(s) Oral at bedtime      PHYSICAL EXAM:  T(C): 36.7 (12-30-21 @ 11:19), Max: 37.1 (12-30-21 @ 04:49)  HR: 68 (12-30-21 @ 11:19) (55 - 68)  BP: 149/74 (12-30-21 @ 11:19) (100/60 - 167/75)  RR: 18 (12-30-21 @ 11:19) (14 - 18)  SpO2: 97% (12-30-21 @ 11:19) (95% - 99%)  Wt(kg): --  I&O's Summary    29 Dec 2021 07:01  -  30 Dec 2021 07:00  --------------------------------------------------------  IN: 410 mL / OUT: 0 mL / NET: 410 mL          Appearance: Normal	  HEENT:  PERRLA   Lymphatic: No lymphadenopathy   Cardiovascular: Normal S1 S2, no JVD  Respiratory: normal effort , clear  Gastrointestinal:  Soft, Non-tender  Skin: No rashes,  warm to touch  Psychiatry:  Mood & affect appropriate  Musculuskeletal: No edema      All labs, Imaging and EKGs personally reviewed                 12-29-21 @ 07:01  -  12-30-21 @ 07:00  --------------------------------------------------------  IN: 410 mL / OUT: 0 mL / NET: 410 mL             Name of Patient : JERRI SIDHU  MRN: [id number]  Date of visit: 12-30-21 @ 14:08      Subjective: Patient seen and examined. No new events except as noted.   Patient is complaining of productive cough and sore throat.   Patient is saturating 97 % on room air. Patient is afebrile.   Denies chest pain, palpitations, shortness of breath or difficulty breathing.     REVIEW OF SYSTEMS:    CONSTITUTIONAL: No weakness, fevers or chills  EYES/ENT: + Sore throat   NECK: No pain or stiffness  RESPIRATORY: +Cough with sputum of white color   CARDIOVASCULAR: No chest pain or palpitations  GASTROINTESTINAL: No abdominal or epigastric pain. No nausea, vomiting, or hematemesis; No diarrhea or constipation. No melena or hematochezia.  GENITOURINARY: No dysuria, frequency or hematuria  NEUROLOGICAL: No numbness or weakness  SKIN: No itching, burning, rashes, or lesions   All other review of systems is negative unless indicated above.      MEDICATIONS:  MEDICATIONS  (STANDING):  aspirin enteric coated 81 milliGRAM(s) Oral daily  budesonide 160 MICROgram(s)/formoterol 4.5 MICROgram(s) Inhaler 2 Puff(s) Inhalation two times a day  dextrose 40% Gel 15 Gram(s) Oral once  dextrose 5%. 1000 milliLiter(s) (50 mL/Hr) IV Continuous <Continuous>  dextrose 5%. 1000 milliLiter(s) (100 mL/Hr) IV Continuous <Continuous>  dextrose 50% Injectable 25 Gram(s) IV Push once  dextrose 50% Injectable 12.5 Gram(s) IV Push once  dextrose 50% Injectable 25 Gram(s) IV Push once  diltiazem    milliGRAM(s) Oral daily  DULoxetine 20 milliGRAM(s) Oral daily  enoxaparin Injectable 40 milliGRAM(s) SubCutaneous daily  glucagon  Injectable 1 milliGRAM(s) IntraMuscular once  insulin glargine Injectable (LANTUS) 5 Unit(s) SubCutaneous at bedtime  insulin lispro (ADMELOG) corrective regimen sliding scale   SubCutaneous three times a day before meals  insulin lispro (ADMELOG) corrective regimen sliding scale   SubCutaneous at bedtime  levothyroxine 25 MICROGram(s) Oral daily  metoprolol tartrate 100 milliGRAM(s) Oral two times a day  QUEtiapine 25 milliGRAM(s) Oral at bedtime  remdesivir  IVPB   IV Intermittent   remdesivir  IVPB 100 milliGRAM(s) IV Intermittent every 24 hours  simvastatin 20 milliGRAM(s) Oral at bedtime  tamsulosin 0.4 milliGRAM(s) Oral at bedtime      PHYSICAL EXAM:  T(C): 36.7 (12-30-21 @ 11:19), Max: 37.1 (12-30-21 @ 04:49)  HR: 68 (12-30-21 @ 11:19) (55 - 68)  BP: 149/74 (12-30-21 @ 11:19) (100/60 - 167/75)  RR: 18 (12-30-21 @ 11:19) (14 - 18)  SpO2: 97% (12-30-21 @ 11:19) (95% - 99%)  Wt(kg): --  I&O's Summary    29 Dec 2021 07:01  -  30 Dec 2021 07:00  --------------------------------------------------------  IN: 410 mL / OUT: 0 mL / NET: 410 mL        Appearance: Normal, Calm, speaking in full sentences  	  HEENT:  Non- exudative throat  Lymphatic: No lymphadenopathy   Cardiovascular: Normal S1 S2, no JVD  Respiratory: normal effort , clear  Gastrointestinal:  Soft, Non-tender  Skin: No rashes,  warm to touch  Psychiatry:  Mood & affect appropriate  Musculoskeletal: No edema      12-29-21 @ 07:01  -  12-30-21 @ 07:00  --------------------------------------------------------  IN: 410 mL / OUT: 0 mL / NET: 410 mL                              11.0   4.26  )-----------( 260      ( 29 Dec 2021 06:38 )             31.6               12-30    126<L>  |  94<L>  |  26<H>  ----------------------------<  186<H>  4.3   |  19<L>  |  1.17    Ca    8.5      30 Dec 2021 07:37    TPro  6.4  /  Alb  3.4  /  TBili  0.2  /  DBili  <0.1  /  AST  23  /  ALT  19  /  AlkPhos  55  12-30    PT/INR - ( 30 Dec 2021 07:38 )   PT: 14.8 sec;   INR: 1.25 ratio                  Culture - Sputum . (12.28.21 @ 21:43)   Gram Stain: Moderate polymorphonuclear leukocytes per low power field   Few Squamous epithelial cells per low power field   Numerous Gram Positive Cocci in Pairs and Chains seen per oil power field   Specimen Source: .Sputum Sputum   Culture Results: Normal Respiratory Denise present     Culture - Blood (12.28.21 @ 03:21)   Specimen Source: .Blood Blood-Venous   Culture Results: No growth to date.     Culture - Blood (12.28.21 @ 03:21)   Specimen Source: .Blood Blood-Venous   Culture Results: No growth to date.

## 2021-12-30 NOTE — PROGRESS NOTE ADULT - SUBJECTIVE AND OBJECTIVE BOX
[facility] NEPHROLOGY- PROGRESS NOTE    76y Male with history of HTN, DM, depression presents with confusion. Nephrology consulted for hyponatremia.    REVIEW OF SYSTEMS:  Gen: no changes in weight  Cards: no chest pain  Resp: no dyspnea, + cough  GI: no nausea or vomiting or diarrhea  Vascular: no LE edema    No Known Allergies      Hospital Medications: Medications reviewed    VITALS:  T(F): 98.7 (21 @ 04:49), Max: 98.7 (21 @ 04:49)  HR: 55 (21 @ 04:49)  BP: 100/60 (21 @ 04:49)  RR: 14 (21 @ 04:49)  SpO2: 95% (21 @ 04:49)  Wt(kg): --  Height (cm): 165.1 ( @ 14:40)  Weight (kg): 74.8 ( @ 14:40)  BMI (kg/m2): 27.4 ( 14:40)  BSA (m2): 1.82 ( @ 14:40)     @ 07:01  -   @ 07:00  --------------------------------------------------------  IN: 410 mL / OUT: 0 mL / NET: 410 mL        PHYSICAL EXAM:    Gen: NAD, calm  Cards: RRR, +S1/S2, no M/G/R  Resp: course BS B/L  GI: soft, NT/ND, NABS  Vascular: no LE edema B/L    LABS:      126<L>  |  94<L>  |  26<H>  ----------------------------<  186<H>  4.3   |  19<L>  |  1.17    Ca    8.5      30 Dec 2021 07:37    TPro  6.4  /  Alb  3.4  /  TBili  0.2  /  DBili  <0.1  /  AST  23  /  ALT  19  /  AlkPhos  55      Creatinine Trend: 1.17 <--, 1.14 <--, 1.25 <--, 1.21 <--, 1.27 <--, 1.28 <--                        11.0   4.26  )-----------( 260      ( 29 Dec 2021 06:38 )             31.6     Urine Studies:  Urinalysis Basic - ( 27 Dec 2021 16:48 )    Color: Light Yellow / Appearance: Clear / S.011 / pH:   Gluc:  / Ketone: Negative  / Bili: Negative / Urobili: Negative   Blood:  / Protein: 30 mg/dL / Nitrite: Negative   Leuk Esterase: Negative / RBC: 1 /hpf / WBC 1 /HPF   Sq Epi:  / Non Sq Epi: 0 /hpf / Bacteria: Negative      Sodium, Random Urine: 49 mmol/L ( @ 07:10)  Sodium, Random Urine: 91 mmol/L ( @ 01:15)  Osmolality, Random Urine: 390 mos/kg ( @ 01:15)  Potassium, Random Urine: 30 mmol/L ( @ 01:15)  Creatinine, Random Urine: 54 mg/dL ( @ 01:15)      RADIOLOGY & ADDITIONAL STUDIES:

## 2021-12-30 NOTE — PROGRESS NOTE ADULT - SUBJECTIVE AND OBJECTIVE BOX
Follow Up:  COVID    Interval History: pt afebrile and on RA, throat and sputum cx also negative     ROS:      All other systems negative    Constitutional: no fever, no chills  ENT:  some sore throat, no rhinorrhea  Cardiovascular:  no chest pain, no palpitation  Respiratory:  no SOB, no cough  GI:  no abd pain, no vomiting, no diarrhea  urinary: no dysuria, no hematuria, no flank pain  musculoskeletal:  no joint pain, no joint swelling  skin:  no rash  neurology:  no headache, no seizure            Allergies  No Known Allergies        ANTIMICROBIALS:  remdesivir  IVPB    remdesivir  IVPB 100 every 24 hours      OTHER MEDS:  acetaminophen     Tablet .. 650 milliGRAM(s) Oral every 4 hours PRN  aspirin enteric coated 81 milliGRAM(s) Oral daily  benzonatate 100 milliGRAM(s) Oral every 8 hours PRN  budesonide 160 MICROgram(s)/formoterol 4.5 MICROgram(s) Inhaler 2 Puff(s) Inhalation two times a day  dextrose 40% Gel 15 Gram(s) Oral once  dextrose 5%. 1000 milliLiter(s) IV Continuous <Continuous>  dextrose 5%. 1000 milliLiter(s) IV Continuous <Continuous>  dextrose 50% Injectable 25 Gram(s) IV Push once  dextrose 50% Injectable 12.5 Gram(s) IV Push once  dextrose 50% Injectable 25 Gram(s) IV Push once  diltiazem    milliGRAM(s) Oral daily  DULoxetine 20 milliGRAM(s) Oral daily  enoxaparin Injectable 40 milliGRAM(s) SubCutaneous daily  glucagon  Injectable 1 milliGRAM(s) IntraMuscular once  insulin glargine Injectable (LANTUS) 5 Unit(s) SubCutaneous at bedtime  insulin lispro (ADMELOG) corrective regimen sliding scale   SubCutaneous three times a day before meals  insulin lispro (ADMELOG) corrective regimen sliding scale   SubCutaneous at bedtime  levothyroxine 25 MICROGram(s) Oral daily  metoprolol tartrate 100 milliGRAM(s) Oral two times a day  QUEtiapine 25 milliGRAM(s) Oral at bedtime  simvastatin 20 milliGRAM(s) Oral at bedtime  sodium chloride 1 Gram(s) Oral three times a day  tamsulosin 0.4 milliGRAM(s) Oral at bedtime      Vital Signs Last 24 Hrs  T(C): 37.1 (30 Dec 2021 04:49), Max: 37.1 (30 Dec 2021 04:49)  T(F): 98.7 (30 Dec 2021 04:49), Max: 98.7 (30 Dec 2021 04:49)  HR: 55 (30 Dec 2021 04:49) (49 - 68)  BP: 100/60 (30 Dec 2021 04:49) (100/60 - 167/75)  BP(mean): --  RR: 14 (30 Dec 2021 04:49) (14 - 18)  SpO2: 95% (30 Dec 2021 04:49) (95% - 99%)    Physical Exam:  General:    NAD,  non toxic  Cardio:     regular S1, S2  Respiratory:    clear b/l,    no wheezing  abd:     soft,   BS +,   no tenderness  :   no CVAT,  no suprapubic tenderness,   no  shoemaker  Musculoskeletal:   no joint swelling  vascular: no phlebitis  Skin:    no rash                          11.0   4.26  )-----------( 260      ( 29 Dec 2021 06:38 )             31.6       12-30    126<L>  |  94<L>  |  26<H>  ----------------------------<  186<H>  4.3   |  19<L>  |  1.17    Ca    8.5      30 Dec 2021 07:37    TPro  6.4  /  Alb  3.4  /  TBili  0.2  /  DBili  <0.1  /  AST  23  /  ALT  19  /  AlkPhos  55  12-30          MICROBIOLOGY:  v  .Throat Throat  12-28-21   No Streptococcus pyogenes (Group A) isolated  --  --      .Sputum Sputum  12-28-21   Normal Respiratory Denise present  --    Moderate polymorphonuclear leukocytes per low power field  Few Squamous epithelial cells per low power field  Numerous Gram Positive Cocci in Pairs and Chains seen per oil power field      .Blood Blood-Venous  12-28-21   No growth to date.  --  --      Clean Catch Clean Catch (Midstream)  12-27-21   >=3 organisms. Probable collection contamination.  --  --                RADIOLOGY:  Images independently visualized and reviewed personally, findings as below  < from: CT Head No Cont (12.27.21 @ 21:58) >  IMPRESSION: Abnormal area of low-attenuation involving the right centrum   semiovale region.    Age-indeterminate lacunar infarct is seen involving the left thalamus.      < end of copied text >  < from: Xray Chest 1 View- PORTABLE-Urgent (Xray Chest 1 View- PORTABLE-Urgent .) (12.27.21 @ 15:26) >    IMPRESSION:  Clear lungs.    < end of copied text >

## 2021-12-31 LAB
ALBUMIN SERPL ELPH-MCNC: 3.4 G/DL — SIGNIFICANT CHANGE UP (ref 3.3–5)
ALP SERPL-CCNC: 58 U/L — SIGNIFICANT CHANGE UP (ref 40–120)
ALT FLD-CCNC: 19 U/L — SIGNIFICANT CHANGE UP (ref 10–45)
ANION GAP SERPL CALC-SCNC: 10 MMOL/L — SIGNIFICANT CHANGE UP (ref 5–17)
AST SERPL-CCNC: 22 U/L — SIGNIFICANT CHANGE UP (ref 10–40)
BILIRUB DIRECT SERPL-MCNC: <0.1 MG/DL — SIGNIFICANT CHANGE UP (ref 0–0.3)
BILIRUB INDIRECT FLD-MCNC: >0.1 MG/DL — LOW (ref 0.2–1)
BILIRUB SERPL-MCNC: 0.2 MG/DL — SIGNIFICANT CHANGE UP (ref 0.2–1.2)
BUN SERPL-MCNC: 18 MG/DL — SIGNIFICANT CHANGE UP (ref 7–23)
CALCIUM SERPL-MCNC: 8.4 MG/DL — SIGNIFICANT CHANGE UP (ref 8.4–10.5)
CHLORIDE SERPL-SCNC: 100 MMOL/L — SIGNIFICANT CHANGE UP (ref 96–108)
CO2 SERPL-SCNC: 20 MMOL/L — LOW (ref 22–31)
CREAT SERPL-MCNC: 1.02 MG/DL — SIGNIFICANT CHANGE UP (ref 0.5–1.3)
CREAT SERPL-MCNC: 1.05 MG/DL — SIGNIFICANT CHANGE UP (ref 0.5–1.3)
CRP SERPL-MCNC: 52 MG/L — HIGH (ref 0–4)
D DIMER BLD IA.RAPID-MCNC: 1326 NG/ML DDU — HIGH
GLUCOSE SERPL-MCNC: 239 MG/DL — HIGH (ref 70–99)
HCT VFR BLD CALC: 33.2 % — LOW (ref 39–50)
HGB BLD-MCNC: 11 G/DL — LOW (ref 13–17)
INR BLD: 1.21 RATIO — HIGH (ref 0.88–1.16)
MCHC RBC-ENTMCNC: 32.5 PG — SIGNIFICANT CHANGE UP (ref 27–34)
MCHC RBC-ENTMCNC: 33.1 GM/DL — SIGNIFICANT CHANGE UP (ref 32–36)
MCV RBC AUTO: 98.2 FL — SIGNIFICANT CHANGE UP (ref 80–100)
NRBC # BLD: 0 /100 WBCS — SIGNIFICANT CHANGE UP (ref 0–0)
PLATELET # BLD AUTO: 280 K/UL — SIGNIFICANT CHANGE UP (ref 150–400)
POTASSIUM SERPL-MCNC: 4.7 MMOL/L — SIGNIFICANT CHANGE UP (ref 3.5–5.3)
POTASSIUM SERPL-SCNC: 4.7 MMOL/L — SIGNIFICANT CHANGE UP (ref 3.5–5.3)
PROT SERPL-MCNC: 6.6 G/DL — SIGNIFICANT CHANGE UP (ref 6–8.3)
PROTHROM AB SERPL-ACNC: 14.4 SEC — HIGH (ref 10.6–13.6)
RBC # BLD: 3.38 M/UL — LOW (ref 4.2–5.8)
RBC # FLD: 13.4 % — SIGNIFICANT CHANGE UP (ref 10.3–14.5)
SODIUM SERPL-SCNC: 130 MMOL/L — LOW (ref 135–145)
WBC # BLD: 6.41 K/UL — SIGNIFICANT CHANGE UP (ref 3.8–10.5)
WBC # FLD AUTO: 6.41 K/UL — SIGNIFICANT CHANGE UP (ref 3.8–10.5)

## 2021-12-31 PROCEDURE — 71275 CT ANGIOGRAPHY CHEST: CPT | Mod: 26

## 2021-12-31 RX ORDER — ENOXAPARIN SODIUM 100 MG/ML
75 INJECTION SUBCUTANEOUS
Refills: 0 | Status: DISCONTINUED | OUTPATIENT
Start: 2021-12-31 | End: 2021-12-31

## 2021-12-31 RX ORDER — ENOXAPARIN SODIUM 100 MG/ML
70 INJECTION SUBCUTANEOUS
Refills: 0 | Status: DISCONTINUED | OUTPATIENT
Start: 2021-12-31 | End: 2022-01-02

## 2021-12-31 RX ADMIN — SIMVASTATIN 20 MILLIGRAM(S): 20 TABLET, FILM COATED ORAL at 22:09

## 2021-12-31 RX ADMIN — ENOXAPARIN SODIUM 70 MILLIGRAM(S): 100 INJECTION SUBCUTANEOUS at 17:06

## 2021-12-31 RX ADMIN — BUDESONIDE AND FORMOTEROL FUMARATE DIHYDRATE 2 PUFF(S): 160; 4.5 AEROSOL RESPIRATORY (INHALATION) at 22:10

## 2021-12-31 RX ADMIN — TAMSULOSIN HYDROCHLORIDE 0.4 MILLIGRAM(S): 0.4 CAPSULE ORAL at 22:09

## 2021-12-31 RX ADMIN — Medication 25 MICROGRAM(S): at 06:22

## 2021-12-31 RX ADMIN — SODIUM CHLORIDE 1 GRAM(S): 9 INJECTION INTRAMUSCULAR; INTRAVENOUS; SUBCUTANEOUS at 06:22

## 2021-12-31 RX ADMIN — SODIUM CHLORIDE 1 GRAM(S): 9 INJECTION INTRAMUSCULAR; INTRAVENOUS; SUBCUTANEOUS at 14:03

## 2021-12-31 RX ADMIN — DULOXETINE HYDROCHLORIDE 20 MILLIGRAM(S): 30 CAPSULE, DELAYED RELEASE ORAL at 12:11

## 2021-12-31 RX ADMIN — BUDESONIDE AND FORMOTEROL FUMARATE DIHYDRATE 2 PUFF(S): 160; 4.5 AEROSOL RESPIRATORY (INHALATION) at 08:15

## 2021-12-31 RX ADMIN — Medication 100 MILLIGRAM(S): at 06:22

## 2021-12-31 RX ADMIN — Medication 100 MILLIGRAM(S): at 15:46

## 2021-12-31 RX ADMIN — ENOXAPARIN SODIUM 40 MILLIGRAM(S): 100 INJECTION SUBCUTANEOUS at 12:11

## 2021-12-31 RX ADMIN — Medication 2: at 08:13

## 2021-12-31 RX ADMIN — Medication 2: at 12:15

## 2021-12-31 RX ADMIN — REMDESIVIR 500 MILLIGRAM(S): 5 INJECTION INTRAVENOUS at 11:03

## 2021-12-31 RX ADMIN — Medication 300 MILLIGRAM(S): at 08:14

## 2021-12-31 RX ADMIN — Medication 3: at 17:05

## 2021-12-31 RX ADMIN — INSULIN GLARGINE 5 UNIT(S): 100 INJECTION, SOLUTION SUBCUTANEOUS at 22:10

## 2021-12-31 RX ADMIN — SODIUM CHLORIDE 1 GRAM(S): 9 INJECTION INTRAMUSCULAR; INTRAVENOUS; SUBCUTANEOUS at 22:09

## 2021-12-31 RX ADMIN — QUETIAPINE FUMARATE 25 MILLIGRAM(S): 200 TABLET, FILM COATED ORAL at 22:09

## 2021-12-31 RX ADMIN — Medication 81 MILLIGRAM(S): at 12:11

## 2021-12-31 NOTE — PROGRESS NOTE ADULT - ASSESSMENT
76y Male with history of HTN, DM, depression presents with confusion. Nephrology consulted for hyponatremia.    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 Serum Na improving s/p tolvaptan 15 mg PO X 1 on 12/30. Can resume sodium chloride 1 gram TID today. Continue with 1L FR and glucerna to increase osmolar intake. AM cortisol wnl. Repeat urine studies consistent with SIADH. Monitor serum Na.    2) HTN: BP controlled. Continue with current medications. Monitor BP.    3) LE edema: Patient appears euvolemic and TTE unremarkable. Unclear why patient on aldactone. Regardless, would hold for now. Monitor UO.    4) Depression: On cymbalta and seroquel. Both medications have been reported to cause hyponatremia however frequency is generally low. No renal objection to continue medications as needed for depression.    [facility] NEPHROLOGY  Tr Begum M.D.  Kelechi Chaudhry D.O.  Mariam Bell M.D.  Radha Burnett, MSN, ANP-C    Telephone: [phone]  Facsimile: [phone]    [address] [place] [address]

## 2021-12-31 NOTE — PROGRESS NOTE ADULT - ASSESSMENT
Patient is a 75 y/o M--with a PMHx of hypothyroidism on Synthroid, essential HTN on Lopressor, aldactone, depression on Seroquel and Cymbalta, CAD with S/P cardiac catheterization by Dr. Daryl Patrick at [facility] in Nov 2019, type 2 DM on Januvia and bedtime Levemir, with the patient followed by his PCP above and apparently has had upper nasal congestion and loose BM for the past 3 weeks, with the son concerned with increased confusion and decreased ADL and exertional dyspnoea for the past 3 weeks, but worsening for the past few days, with the patient with an apparent outpatient lab assay with hyponatremia and was sent to the ER by his PCP, apparently with prior episodes of hyponatremia, under the presumption of the hyponatremia as the primary mechanism of the confusional state.  Patient had just received his booster dose of the COVID-10 Moderna 7 days ago.   No fever, no chills, no rigors.  NO sore throat but with upper nasal congestion as above.  NO neck pain.  No dyspnoea at present.  NO chest pain/pressure.  NO HA, no focal weakness.  No diaphoresis.        #COVID-19  --CT head ordered--NO acute bleed or mass.     --Presently NOT requiring supplemental O2 to warrant Decadron.     --Patient is currently saturating 98% on room air  --Discussed with ID attending Dr. Cintron, patient is not in need of Dexamethasone   --Per ID, can continue with Remdesivir (day 3) for a total of 5 days (Started on 12/28) -- monitor renal function and LFTs   --Monitor inflammatory markers   --Continue with Symbicort 160  --Monitor patient clinically O2 saturation, vital signs and temperature curve       #Elevated D-Dimer  --D-Dimer noted to be 1326 today 12/31 from 341 12/28  --CT Angio ordered to R/O PE  --VA Duplex LE ordered to R/O DVT   --Will start patient on full dose Lovenox 75mg BID until imaging results   --Continue to trend D-Dimer       #Hyponatremia.   --CT head   --Continue with Fluid restriction; Monitor Na+ and daily weights.    --Renal evaluation, F/U Recommendations  --Started on Salt tab (Sodium chloride) per renal   --Continue to hold aldactone  --Cymbalta, and Seroquel can be resumed per nephrology; monitor QTC   --Monitor BMP daily -- Na noted to be 130 12/31; F/U nephrology recommendations   --Likely due to SIADH; S/P Tolvaptan 12/30, Salt tabs have been resumed today 12/31  --F/U repeat Urine Na and Osmolality, monitor Serum Na on AM labs        #CAD (coronary artery disease).   --Upgraded to telemetry to exclude cardiac equivalent.  --Continue with Aspirin   --Continue with Simvastatin   --Cardio consult PRN   --Check ECHO -- EF of 65%       #Type 2 diabetes mellitus.   --Continue with ISS for now  --Patient was on bedtime Lantus at home  --Continue to monitor FS and serum glucose levels  --Avoid Hypo/Hyperglycemia  --A1C -- 7.1  --Patient will need outpatient follow up for DM management and control   --Endocrinology consultation PRN       #Productive cough  --Patient reporting productive cough with white sputum  --Sputum culture-- Moderate polymorphonuclear leukocytes per low power field, Few Squamous epithelial cells per low power field, Numerous Gram Positive Cocci in Pairs and Chains seen per oil power field  --Throat culture --Negative   --Tessalon perle for cough  --Cough syrup PRN for cough       #Sore throat  --Patient endorsing sore throat  --Throat culture-- negative   --Tylenol for pain control         #HTN  --Monitor Vital Signs and BP closely  --Continue with home medication Lopressor for now  --Aldactone is on hold as may cause HypoNa  --Monitor BP       #Loose bowels  --Per patient experienced loose bowel outpatient   --If occurs and of watery consistency check for C.Diff   --Likely due to COVID  --Blood culture X 2 with NGTD  --Urine culture with probable contamination and patient is currently asymptomatic  --Continue to monitor       #Depression  --Per nephrology can resume home medications-- Seroquel and Cymbalta that were previously on hold due to hyponatremia   --Continue to monitor patient and monitor QTC   --Psych consult PRN       #Hypothyroidism   --TSH on this admission-- WNL at 2.78  --Continue with home dose Synthroid     #Need for prophylactic measure.   -- DVT prophylaxis with Lovenox 40    #Bradycardia   --Patient with bradycardia on tele   --At time of visit, patient was asymptomatic   --Continue to monitor on Tele and close monitoring of patient     --Patient will need outpatient follow up for CT Head findings

## 2021-12-31 NOTE — PROGRESS NOTE ADULT - SUBJECTIVE AND OBJECTIVE BOX
Name of Patient : JERRI SIDHU  MRN: [id number]  Date of visit: 12-31-21 @ 12:17      Subjective: Patient seen and examined. No new events except as noted.   Patient is S/P Tolvaptan yesterday 12/30, serum Na today is   Patient reports that his sore throat is improving and still has a productive cough. Denies chest pain, palpitations, shortness of breath or difficulty breathing.   Patient is currently afebrile (current temperature of 98.1) and is saturating 98% on room air.     REVIEW OF SYSTEMS:    CONSTITUTIONAL: No weakness, fevers or chills  EYES/ENT: + Sore throat, improving per patient   NECK: No pain or stiffness  RESPIRATORY: +Cough with sputum of white color   CARDIOVASCULAR: No chest pain or palpitations  GASTROINTESTINAL: No abdominal or epigastric pain. No nausea, vomiting, or hematemesis; No diarrhea or constipation. No melena or hematochezia.  GENITOURINARY: No dysuria, frequency or hematuria  NEUROLOGICAL: No numbness or weakness  SKIN: No itching, burning, rashes, or lesions   All other review of systems is negative unless indicated above.    MEDICATIONS:  MEDICATIONS  (STANDING):  aspirin enteric coated 81 milliGRAM(s) Oral daily  budesonide 160 MICROgram(s)/formoterol 4.5 MICROgram(s) Inhaler 2 Puff(s) Inhalation two times a day  dextrose 40% Gel 15 Gram(s) Oral once  dextrose 5%. 1000 milliLiter(s) (50 mL/Hr) IV Continuous <Continuous>  dextrose 5%. 1000 milliLiter(s) (100 mL/Hr) IV Continuous <Continuous>  dextrose 50% Injectable 25 Gram(s) IV Push once  dextrose 50% Injectable 12.5 Gram(s) IV Push once  dextrose 50% Injectable 25 Gram(s) IV Push once  diltiazem    milliGRAM(s) Oral daily  DULoxetine 20 milliGRAM(s) Oral daily  enoxaparin Injectable 40 milliGRAM(s) SubCutaneous daily  glucagon  Injectable 1 milliGRAM(s) IntraMuscular once  insulin glargine Injectable (LANTUS) 5 Unit(s) SubCutaneous at bedtime  insulin lispro (ADMELOG) corrective regimen sliding scale   SubCutaneous three times a day before meals  insulin lispro (ADMELOG) corrective regimen sliding scale   SubCutaneous at bedtime  levothyroxine 25 MICROGram(s) Oral daily  metoprolol tartrate 100 milliGRAM(s) Oral two times a day  QUEtiapine 25 milliGRAM(s) Oral at bedtime  remdesivir  IVPB   IV Intermittent   remdesivir  IVPB 100 milliGRAM(s) IV Intermittent every 24 hours  simvastatin 20 milliGRAM(s) Oral at bedtime  sodium chloride 1 Gram(s) Oral three times a day  tamsulosin 0.4 milliGRAM(s) Oral at bedtime      PHYSICAL EXAM:  T(C): 36.7 (12-31-21 @ 11:07), Max: 36.7 (12-31-21 @ 04:23)  HR: 66 (12-31-21 @ 11:07) (63 - 67)  BP: 130/75 (12-31-21 @ 11:07) (113/69 - 157/54)  RR: 18 (12-31-21 @ 11:07) (18 - 18)  SpO2: 98% (12-31-21 @ 11:07) (96% - 100%)  Wt(kg): --  I&O's Summary      Appearance: Normal, in no distress, speaking in full sentences  	  HEENT:  Non- exudative throat  Lymphatic: No lymphadenopathy   Cardiovascular: Normal S1 S2, no JVD  Respiratory: normal effort , clear  Gastrointestinal:  Soft, Non-tender  Skin: No rashes,  warm to touch  Psychiatry:  Mood & affect appropriate  Musculoskeletal: No edema                          11.0   6.41  )-----------( 280      ( 31 Dec 2021 06:04 )             33.2               12-31    130<L>  |  100  |  18  ----------------------------<  239<H>  4.7   |  20<L>  |  1.05    Ca    8.4      31 Dec 2021 09:16    TPro  6.6  /  Alb  3.4  /  TBili  0.2  /  DBili  <0.1  /  AST  22  /  ALT  19  /  AlkPhos  58  12-31    PT/INR - ( 31 Dec 2021 06:04 )   PT: 14.4 sec;   INR: 1.21 ratio         D-Dimer Assay, Quantitative (12.31.21 @ 09:16)   D-Dimer Assay, Quantitative: 1326    D-Dimer Assay, Quantitative (12.28.21 @ 01:10)   D-Dimer Assay, Quantitative: 341      Culture - Group A Streptococcus (12.28.21 @ 21:45)   Specimen Source: .Throat Throat   Culture Results: No Streptococcus pyogenes (Group A) isolated    Culture - Sputum . (12.28.21 @ 21:43)   Gram Stain: Moderate polymorphonuclear leukocytes per low power field   Few Squamous epithelial cells per low power field   Numerous Gram Positive Cocci in Pairs and Chains seen per oil power field   Specimen Source: .Sputum Sputum   Culture Results: Moderate Haemophilus parainfluenzae "Susceptibilities not performed"   Normal Respiratory Denise present     Culture - Blood (12.28.21 @ 03:21)   Specimen Source: .Blood Blood-Venous   Culture Results: No growth to date.     Culture - Blood (12.28.21 @ 03:21)   Specimen Source: .Blood Blood-Venous   Culture Results: No growth to date.     Culture - Urine (12.27.21 @ 19:17)   Specimen Source: Clean Catch Clean Catch (Midstream)   Culture Results: >=3 organisms. Probable collection contamination.

## 2021-12-31 NOTE — PROGRESS NOTE ADULT - SUBJECTIVE AND OBJECTIVE BOX
[facility] NEPHROLOGY- PROGRESS NOTE    76y Male with history of HTN, DM, depression presents with confusion. Nephrology consulted for hyponatremia.    REVIEW OF SYSTEMS:  Gen: no changes in weight  Cards: no chest pain  Resp: no dyspnea, + cough  GI: no nausea or vomiting or diarrhea  Vascular: no LE edema    No Known Allergies      Hospital Medications: Medications reviewed    VITALS:  T(F): 98.1 (21 @ 04:23), Max: 98.1 (21 @ 11:19)  HR: 67 (21 @ 04:23)  BP: 113/69 (21 @ 04:23)  RR: 18 (21 @ 04:23)  SpO2: 96% (21 @ 04:23)  Wt(kg): --      PHYSICAL EXAM:    Gen: NAD, calm  Cards: RRR, +S1/S2, no M/G/R  Resp: CTA ant  GI: soft, NT/ND, NABS  Vascular: no LE edema B/L    LABS:    130 <--, 126 <--, 126 <--, 121 <--, 119 <--, 120 <--  130<L>  |  100  |  18  ----------------------------<  239<H>  4.7   |  20<L>  |  1.05    Ca    8.4      31 Dec 2021 09:16    TPro  6.6  /  Alb  3.4  /  TBili  0.2  /  DBili  <0.1  /  AST  22  /  ALT  19  /  AlkPhos  58      Creatinine Trend: 1.05 <--, 1.02 <--, 1.17 <--, 1.14 <--, 1.25 <--, 1.21 <--, 1.27 <--, 1.28 <--                        11.0   6.41  )-----------( 280      ( 31 Dec 2021 06:04 )             33.2     Urine Studies:  Urinalysis Basic - ( 27 Dec 2021 16:48 )    Color: Light Yellow / Appearance: Clear / S.011 / pH:   Gluc:  / Ketone: Negative  / Bili: Negative / Urobili: Negative   Blood:  / Protein: 30 mg/dL / Nitrite: Negative   Leuk Esterase: Negative / RBC: 1 /hpf / WBC 1 /HPF   Sq Epi:  / Non Sq Epi: 0 /hpf / Bacteria: Negative      Osmolality, Random Urine: 572 mos/kg ( @ 15:00)  Sodium, Random Urine: 49 mmol/L ( @ 07:10)  Sodium, Random Urine: 91 mmol/L ( @ 01:15)  Osmolality, Random Urine: 390 mos/kg ( @ 01:15)  Potassium, Random Urine: 30 mmol/L ( @ 01:15)  Creatinine, Random Urine: 54 mg/dL ( @ 01:15)

## 2022-01-01 LAB
ALBUMIN SERPL ELPH-MCNC: 3.2 G/DL — LOW (ref 3.3–5)
ALP SERPL-CCNC: 57 U/L — SIGNIFICANT CHANGE UP (ref 40–120)
ALT FLD-CCNC: 16 U/L — SIGNIFICANT CHANGE UP (ref 10–45)
AST SERPL-CCNC: 17 U/L — SIGNIFICANT CHANGE UP (ref 10–40)
BILIRUB DIRECT SERPL-MCNC: <0.1 MG/DL — SIGNIFICANT CHANGE UP (ref 0–0.3)
BILIRUB INDIRECT FLD-MCNC: >0 MG/DL — LOW (ref 0.2–1)
BILIRUB SERPL-MCNC: 0.1 MG/DL — LOW (ref 0.2–1.2)
HCT VFR BLD CALC: 29.7 % — LOW (ref 39–50)
HGB BLD-MCNC: 9.9 G/DL — LOW (ref 13–17)
MCHC RBC-ENTMCNC: 32.9 PG — SIGNIFICANT CHANGE UP (ref 27–34)
MCHC RBC-ENTMCNC: 33.3 GM/DL — SIGNIFICANT CHANGE UP (ref 32–36)
MCV RBC AUTO: 98.7 FL — SIGNIFICANT CHANGE UP (ref 80–100)
NRBC # BLD: 0 /100 WBCS — SIGNIFICANT CHANGE UP (ref 0–0)
PLATELET # BLD AUTO: 273 K/UL — SIGNIFICANT CHANGE UP (ref 150–400)
PROT SERPL-MCNC: 6 G/DL — SIGNIFICANT CHANGE UP (ref 6–8.3)
RBC # BLD: 3.01 M/UL — LOW (ref 4.2–5.8)
RBC # FLD: 13.6 % — SIGNIFICANT CHANGE UP (ref 10.3–14.5)
WBC # BLD: 5.81 K/UL — SIGNIFICANT CHANGE UP (ref 3.8–10.5)
WBC # FLD AUTO: 5.81 K/UL — SIGNIFICANT CHANGE UP (ref 3.8–10.5)

## 2022-01-01 RX ORDER — SODIUM CHLORIDE 9 MG/ML
2 INJECTION INTRAMUSCULAR; INTRAVENOUS; SUBCUTANEOUS AT BEDTIME
Refills: 0 | Status: COMPLETED | OUTPATIENT
Start: 2022-01-01 | End: 2022-01-01

## 2022-01-01 RX ORDER — ACETAMINOPHEN 500 MG
650 TABLET ORAL ONCE
Refills: 0 | Status: COMPLETED | OUTPATIENT
Start: 2022-01-01 | End: 2022-01-01

## 2022-01-01 RX ADMIN — SODIUM CHLORIDE 1 GRAM(S): 9 INJECTION INTRAMUSCULAR; INTRAVENOUS; SUBCUTANEOUS at 14:51

## 2022-01-01 RX ADMIN — Medication 2: at 12:02

## 2022-01-01 RX ADMIN — Medication 2: at 16:35

## 2022-01-01 RX ADMIN — REMDESIVIR 500 MILLIGRAM(S): 5 INJECTION INTRAVENOUS at 11:36

## 2022-01-01 RX ADMIN — QUETIAPINE FUMARATE 25 MILLIGRAM(S): 200 TABLET, FILM COATED ORAL at 22:01

## 2022-01-01 RX ADMIN — INSULIN GLARGINE 5 UNIT(S): 100 INJECTION, SOLUTION SUBCUTANEOUS at 22:08

## 2022-01-01 RX ADMIN — ENOXAPARIN SODIUM 70 MILLIGRAM(S): 100 INJECTION SUBCUTANEOUS at 17:17

## 2022-01-01 RX ADMIN — SODIUM CHLORIDE 1 GRAM(S): 9 INJECTION INTRAMUSCULAR; INTRAVENOUS; SUBCUTANEOUS at 05:16

## 2022-01-01 RX ADMIN — SIMVASTATIN 20 MILLIGRAM(S): 20 TABLET, FILM COATED ORAL at 22:00

## 2022-01-01 RX ADMIN — Medication 300 MILLIGRAM(S): at 05:15

## 2022-01-01 RX ADMIN — ENOXAPARIN SODIUM 70 MILLIGRAM(S): 100 INJECTION SUBCUTANEOUS at 05:16

## 2022-01-01 RX ADMIN — Medication 25 MICROGRAM(S): at 05:15

## 2022-01-01 RX ADMIN — Medication 81 MILLIGRAM(S): at 11:39

## 2022-01-01 RX ADMIN — SODIUM CHLORIDE 1 GRAM(S): 9 INJECTION INTRAMUSCULAR; INTRAVENOUS; SUBCUTANEOUS at 22:01

## 2022-01-01 RX ADMIN — Medication 100 MILLIGRAM(S): at 17:19

## 2022-01-01 RX ADMIN — Medication 2: at 08:16

## 2022-01-01 RX ADMIN — BUDESONIDE AND FORMOTEROL FUMARATE DIHYDRATE 2 PUFF(S): 160; 4.5 AEROSOL RESPIRATORY (INHALATION) at 22:07

## 2022-01-01 RX ADMIN — TAMSULOSIN HYDROCHLORIDE 0.4 MILLIGRAM(S): 0.4 CAPSULE ORAL at 22:01

## 2022-01-01 RX ADMIN — Medication 650 MILLIGRAM(S): at 22:32

## 2022-01-01 RX ADMIN — Medication 100 MILLIGRAM(S): at 05:15

## 2022-01-01 RX ADMIN — DULOXETINE HYDROCHLORIDE 20 MILLIGRAM(S): 30 CAPSULE, DELAYED RELEASE ORAL at 11:40

## 2022-01-01 RX ADMIN — SODIUM CHLORIDE 2 GRAM(S): 9 INJECTION INTRAMUSCULAR; INTRAVENOUS; SUBCUTANEOUS at 22:01

## 2022-01-01 NOTE — PROGRESS NOTE ADULT - ASSESSMENT
76y Male with history of HTN, DM, depression presents with confusion. Nephrology consulted for hyponatremia.    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 Serum Na improving s/p tolvaptan 15 mg PO X 1 on 12/30. c/w sodium chloride 1 gram TID, will give an extra 2g PO today. Continue with 1L FR and glucerna to increase osmolar intake. AM cortisol wnl. Repeat urine studies consistent with SIADH. Monitor serum Na.    2) HTN: BP controlled. Continue with current medications. Monitor BP.    3) LE edema: Patient appears euvolemic and TTE unremarkable. Unclear why patient on aldactone. Regardless, would hold for now. Monitor UO.    4) Depression: On cymbalta and seroquel. Both medications have been reported to cause hyponatremia however frequency is generally low. No renal objection to continue medications as needed for depression.    [facility] NEPHROLOGY  Tr Begum M.D.  Kelechi Chaudhry D.O.  Mariam Bell M.D.  Radha Burnett, MSN, ANP-C    Telephone: [phone]  Facsimile: [phone]    [address] [place] [address]

## 2022-01-01 NOTE — PROGRESS NOTE ADULT - SUBJECTIVE AND OBJECTIVE BOX
[facility] NEPHROLOGY- PROGRESS NOTE    76y Male with history of HTN, DM, depression presents with confusion. Nephrology consulted for hyponatremia.    REVIEW OF SYSTEMS:  Gen: no changes in weight  Cards: no chest pain  Resp: no dyspnea, + cough  GI: no nausea or vomiting or diarrhea  Vascular: no LE edema    No Known Allergies      Hospital Medications: Medications reviewed    VITALS:  T(F): 97.8 (22 @ 04:49), Max: 98.8 (21 @ 17:12)  HR: 67 (22 @ 04:49)  BP: 109/69 (22 @ 04:49)  RR: 18 (22 @ 04:49)  SpO2: 96% (22 @ 04:49)  Wt(kg): --     @ 07:01  -   @ 07:00  --------------------------------------------------------  IN: 50 mL / OUT: 0 mL / NET: 50 mL      PHYSICAL EXAM:    Gen: NAD, calm  Cards: RRR, +S1/S2, no M/G/R  Resp: CTA ant  GI: soft, NT/ND, NABS  Vascular: no LE edema B/L    LABS:    131 <--, 130 <--, 126 <--, 126 <--, 121 <--, 119 <--, 120 <--  131<L>  |  102  |  25<H>  ----------------------------<  216<H>  4.9   |  18<L>  |  1.08    Ca    8.7      2022 07:17    TPro  6.0  /  Alb  3.2<L>  /  TBili  0.1<L>  /  DBili  <0.1  /  AST  17  /  ALT  16  /  AlkPhos  57      Creatinine Trend: 1.08 <--, 1.05 <--, 1.02 <--, 1.17 <--, 1.14 <--, 1.25 <--, 1.21 <--, 1.27 <--, 1.28 <--                        9.9    5.81  )-----------( 273      ( 2022 07:10 )             29.7     Urine Studies:  Urinalysis Basic - ( 27 Dec 2021 16:48 )    Color: Light Yellow / Appearance: Clear / S.011 / pH:   Gluc:  / Ketone: Negative  / Bili: Negative / Urobili: Negative   Blood:  / Protein: 30 mg/dL / Nitrite: Negative   Leuk Esterase: Negative / RBC: 1 /hpf / WBC 1 /HPF   Sq Epi:  / Non Sq Epi: 0 /hpf / Bacteria: Negative      Osmolality, Random Urine: 572 mos/kg ( @ 15:00)  Sodium, Random Urine: 49 mmol/L ( @ 07:10)  Sodium, Random Urine: 91 mmol/L ( @ 01:15)  Osmolality, Random Urine: 390 mos/kg ( @ 01:15)  Potassium, Random Urine: 30 mmol/L ( @ 01:15)  Creatinine, Random Urine: 54 mg/dL ( @ 01:15)

## 2022-01-01 NOTE — PROGRESS NOTE ADULT - ASSESSMENT
Patient is a 75 y/o M--with a PMHx of hypothyroidism on Synthroid, essential HTN on Lopressor, aldactone, depression on Seroquel and Cymbalta, CAD with S/P cardiac catheterization by Dr. Daryl Patrick at [facility] in Nov 2019, type 2 DM on Januvia and bedtime Levemir, with the patient followed by his PCP above and apparently has had upper nasal congestion and loose BM for the past 3 weeks, with the son concerned with increased confusion and decreased ADL and exertional dyspnoea for the past 3 weeks, but worsening for the past few days, with the patient with an apparent outpatient lab assay with hyponatremia and was sent to the ER by his PCP, apparently with prior episodes of hyponatremia, under the presumption of the hyponatremia as the primary mechanism of the confusional state.  Patient had just received his booster dose of the COVID-10 Moderna 7 days ago.   No fever, no chills, no rigors.  NO sore throat but with upper nasal congestion as above.  NO neck pain.  No dyspnoea at present.  NO chest pain/pressure.  NO HA, no focal weakness.  No diaphoresis.        #COVID-19  --CT head ordered--NO acute bleed or mass.     --Presently NOT requiring supplemental O2 to warrant Decadron.     --Patient is currently saturating 98% on room air  --Discussed with ID attending Dr. Cintron, patient is not in need of Dexamethasone   --Per ID, can continue with Remdesivir (day 3) for a total of 5 days (Started on 12/28) -- monitor renal function and LFTs   --Monitor inflammatory markers   --Continue with Symbicort 160  --Monitor patient clinically O2 saturation, vital signs and temperature curve       #Elevated D-Dimer  --D-Dimer noted to be 1326 today 12/31 from 341 12/28  --CT Angio ordered to R/O PE, negative for PE  --VA Duplex LE ordered to R/O DVT   --Will start patient on full dose Lovenox 75mg BID until imaging results   --Continue to trend D-Dimer       #Hyponatremia.   --CT head   --Continue with Fluid restriction; Monitor Na+ and daily weights.    --Renal evaluation, F/U Recommendations  --Started on Salt tab (Sodium chloride) per renal   --Continue to hold aldactone  --Cymbalta, and Seroquel can be resumed per nephrology; monitor QTC   --Monitor BMP daily -- Na noted to be 130 12/31; F/U nephrology recommendations   --Likely due to SIADH; S/P Tolvaptan 12/30, Salt tabs have been resumed today 12/31  --F/U repeat Urine Na and Osmolality, monitor Serum Na on AM labs        #CAD (coronary artery disease).   --Upgraded to telemetry to exclude cardiac equivalent.  --Continue with Aspirin   --Continue with Simvastatin   --Cardio consult PRN   --Check ECHO -- EF of 65%       #Type 2 diabetes mellitus.   --Continue with ISS for now  --Patient was on bedtime Lantus at home  --Continue to monitor FS and serum glucose levels  --Avoid Hypo/Hyperglycemia  --A1C -- 7.1  --Patient will need outpatient follow up for DM management and control   --Endocrinology consultation PRN       #Productive cough  --Patient reporting productive cough with white sputum  --Sputum culture-- Moderate polymorphonuclear leukocytes per low power field, Few Squamous epithelial cells per low power field, Numerous Gram Positive Cocci in Pairs and Chains seen per oil power field  --Throat culture --Negative   --Tessalon perle for cough  --Cough syrup PRN for cough       #Sore throat  --Patient endorsing sore throat  --Throat culture-- negative   --Tylenol for pain control         #HTN  --Monitor Vital Signs and BP closely  --Continue with home medication Lopressor for now  --Aldactone is on hold as may cause HypoNa  --Monitor BP       #Loose bowels  --Per patient experienced loose bowel outpatient   --If occurs and of watery consistency check for C.Diff   --Likely due to COVID  --Blood culture X 2 with NGTD  --Urine culture with probable contamination and patient is currently asymptomatic  --Continue to monitor       #Depression  --Per nephrology can resume home medications-- Seroquel and Cymbalta that were previously on hold due to hyponatremia   --Continue to monitor patient and monitor QTC   --Psych consult PRN       #Hypothyroidism   --TSH on this admission-- WNL at 2.78  --Continue with home dose Synthroid     #Need for prophylactic measure.   -- DVT prophylaxis with Lovenox 40    #Bradycardia   --Patient with bradycardia on tele   --At time of visit, patient was asymptomatic   --Continue to monitor on Tele and close monitoring of patient     --Patient will need outpatient follow up for CT Head findings

## 2022-01-02 LAB
ALBUMIN SERPL ELPH-MCNC: 2.9 G/DL — LOW (ref 3.3–5)
ALP SERPL-CCNC: 46 U/L — SIGNIFICANT CHANGE UP (ref 40–120)
ALT FLD-CCNC: 11 U/L — SIGNIFICANT CHANGE UP (ref 10–45)
ANION GAP SERPL CALC-SCNC: 13 MMOL/L — SIGNIFICANT CHANGE UP (ref 5–17)
ANION GAP SERPL CALC-SCNC: 8 MMOL/L — SIGNIFICANT CHANGE UP (ref 5–17)
AST SERPL-CCNC: 10 U/L — SIGNIFICANT CHANGE UP (ref 10–40)
BILIRUB DIRECT SERPL-MCNC: <0.1 MG/DL — SIGNIFICANT CHANGE UP (ref 0–0.3)
BILIRUB INDIRECT FLD-MCNC: >0.1 MG/DL — LOW (ref 0.2–1)
BILIRUB SERPL-MCNC: 0.2 MG/DL — SIGNIFICANT CHANGE UP (ref 0.2–1.2)
BLD GP AB SCN SERPL QL: NEGATIVE — SIGNIFICANT CHANGE UP
BUN SERPL-MCNC: 46 MG/DL — HIGH (ref 7–23)
BUN SERPL-MCNC: 47 MG/DL — HIGH (ref 7–23)
CALCIUM SERPL-MCNC: 8 MG/DL — LOW (ref 8.4–10.5)
CALCIUM SERPL-MCNC: 8 MG/DL — LOW (ref 8.4–10.5)
CHLORIDE SERPL-SCNC: 105 MMOL/L — SIGNIFICANT CHANGE UP (ref 96–108)
CHLORIDE SERPL-SCNC: 106 MMOL/L — SIGNIFICANT CHANGE UP (ref 96–108)
CO2 SERPL-SCNC: 16 MMOL/L — LOW (ref 22–31)
CO2 SERPL-SCNC: 20 MMOL/L — LOW (ref 22–31)
CREAT SERPL-MCNC: 1.22 MG/DL — SIGNIFICANT CHANGE UP (ref 0.5–1.3)
CREAT SERPL-MCNC: 1.24 MG/DL — SIGNIFICANT CHANGE UP (ref 0.5–1.3)
CREAT SERPL-MCNC: 1.24 MG/DL — SIGNIFICANT CHANGE UP (ref 0.5–1.3)
CULTURE RESULTS: SIGNIFICANT CHANGE UP
CULTURE RESULTS: SIGNIFICANT CHANGE UP
GLUCOSE SERPL-MCNC: 212 MG/DL — HIGH (ref 70–99)
GLUCOSE SERPL-MCNC: 292 MG/DL — HIGH (ref 70–99)
HCT VFR BLD CALC: 22 % — LOW (ref 39–50)
HCT VFR BLD CALC: 22.3 % — LOW (ref 39–50)
HCT VFR BLD CALC: 22.4 % — LOW (ref 39–50)
HCT VFR BLD CALC: 22.9 % — LOW (ref 39–50)
HGB BLD-MCNC: 7.5 G/DL — LOW (ref 13–17)
INR BLD: 1.38 RATIO — HIGH (ref 0.88–1.16)
MCHC RBC-ENTMCNC: 32.1 PG — SIGNIFICANT CHANGE UP (ref 27–34)
MCHC RBC-ENTMCNC: 32.8 GM/DL — SIGNIFICANT CHANGE UP (ref 32–36)
MCHC RBC-ENTMCNC: 32.9 PG — SIGNIFICANT CHANGE UP (ref 27–34)
MCHC RBC-ENTMCNC: 33 PG — SIGNIFICANT CHANGE UP (ref 27–34)
MCHC RBC-ENTMCNC: 33.3 PG — SIGNIFICANT CHANGE UP (ref 27–34)
MCHC RBC-ENTMCNC: 33.5 GM/DL — SIGNIFICANT CHANGE UP (ref 32–36)
MCHC RBC-ENTMCNC: 33.6 GM/DL — SIGNIFICANT CHANGE UP (ref 32–36)
MCHC RBC-ENTMCNC: 34.1 GM/DL — SIGNIFICANT CHANGE UP (ref 32–36)
MCV RBC AUTO: 100.4 FL — HIGH (ref 80–100)
MCV RBC AUTO: 95.3 FL — SIGNIFICANT CHANGE UP (ref 80–100)
MCV RBC AUTO: 97.8 FL — SIGNIFICANT CHANGE UP (ref 80–100)
MCV RBC AUTO: 98.7 FL — SIGNIFICANT CHANGE UP (ref 80–100)
NRBC # BLD: 0 /100 WBCS — SIGNIFICANT CHANGE UP (ref 0–0)
OB PNL STL: POSITIVE
PLATELET # BLD AUTO: 244 K/UL — SIGNIFICANT CHANGE UP (ref 150–400)
PLATELET # BLD AUTO: 262 K/UL — SIGNIFICANT CHANGE UP (ref 150–400)
PLATELET # BLD AUTO: 263 K/UL — SIGNIFICANT CHANGE UP (ref 150–400)
PLATELET # BLD AUTO: 271 K/UL — SIGNIFICANT CHANGE UP (ref 150–400)
POTASSIUM SERPL-MCNC: 4.7 MMOL/L — SIGNIFICANT CHANGE UP (ref 3.5–5.3)
POTASSIUM SERPL-MCNC: 5 MMOL/L — SIGNIFICANT CHANGE UP (ref 3.5–5.3)
POTASSIUM SERPL-SCNC: 4.7 MMOL/L — SIGNIFICANT CHANGE UP (ref 3.5–5.3)
POTASSIUM SERPL-SCNC: 5 MMOL/L — SIGNIFICANT CHANGE UP (ref 3.5–5.3)
PROT SERPL-MCNC: 5.5 G/DL — LOW (ref 6–8.3)
PROTHROM AB SERPL-ACNC: 16.3 SEC — HIGH (ref 10.6–13.6)
RBC # BLD: 2.25 M/UL — LOW (ref 4.2–5.8)
RBC # BLD: 2.27 M/UL — LOW (ref 4.2–5.8)
RBC # BLD: 2.28 M/UL — LOW (ref 4.2–5.8)
RBC # BLD: 2.34 M/UL — LOW (ref 4.2–5.8)
RBC # FLD: 13.8 % — SIGNIFICANT CHANGE UP (ref 10.3–14.5)
RBC # FLD: 13.9 % — SIGNIFICANT CHANGE UP (ref 10.3–14.5)
RBC # FLD: 13.9 % — SIGNIFICANT CHANGE UP (ref 10.3–14.5)
RBC # FLD: 15.4 % — HIGH (ref 10.3–14.5)
RH IG SCN BLD-IMP: POSITIVE — SIGNIFICANT CHANGE UP
SODIUM SERPL-SCNC: 134 MMOL/L — LOW (ref 135–145)
SODIUM SERPL-SCNC: 134 MMOL/L — LOW (ref 135–145)
SPECIMEN SOURCE: SIGNIFICANT CHANGE UP
SPECIMEN SOURCE: SIGNIFICANT CHANGE UP
WBC # BLD: 5.88 K/UL — SIGNIFICANT CHANGE UP (ref 3.8–10.5)
WBC # BLD: 6.39 K/UL — SIGNIFICANT CHANGE UP (ref 3.8–10.5)
WBC # BLD: 6.48 K/UL — SIGNIFICANT CHANGE UP (ref 3.8–10.5)
WBC # BLD: 7.3 K/UL — SIGNIFICANT CHANGE UP (ref 3.8–10.5)
WBC # FLD AUTO: 5.88 K/UL — SIGNIFICANT CHANGE UP (ref 3.8–10.5)
WBC # FLD AUTO: 6.39 K/UL — SIGNIFICANT CHANGE UP (ref 3.8–10.5)
WBC # FLD AUTO: 6.48 K/UL — SIGNIFICANT CHANGE UP (ref 3.8–10.5)
WBC # FLD AUTO: 7.3 K/UL — SIGNIFICANT CHANGE UP (ref 3.8–10.5)

## 2022-01-02 PROCEDURE — 93010 ELECTROCARDIOGRAM REPORT: CPT

## 2022-01-02 RX ORDER — ACETAMINOPHEN 500 MG
650 TABLET ORAL ONCE
Refills: 0 | Status: COMPLETED | OUTPATIENT
Start: 2022-01-02 | End: 2022-01-02

## 2022-01-02 RX ORDER — PANTOPRAZOLE SODIUM 20 MG/1
40 TABLET, DELAYED RELEASE ORAL
Refills: 0 | Status: DISCONTINUED | OUTPATIENT
Start: 2022-01-02 | End: 2022-01-06

## 2022-01-02 RX ORDER — PANTOPRAZOLE SODIUM 20 MG/1
40 TABLET, DELAYED RELEASE ORAL DAILY
Refills: 0 | Status: DISCONTINUED | OUTPATIENT
Start: 2022-01-02 | End: 2022-01-02

## 2022-01-02 RX ADMIN — Medication 300 MILLIGRAM(S): at 05:19

## 2022-01-02 RX ADMIN — QUETIAPINE FUMARATE 25 MILLIGRAM(S): 200 TABLET, FILM COATED ORAL at 21:38

## 2022-01-02 RX ADMIN — SODIUM CHLORIDE 1 GRAM(S): 9 INJECTION INTRAMUSCULAR; INTRAVENOUS; SUBCUTANEOUS at 05:19

## 2022-01-02 RX ADMIN — BUDESONIDE AND FORMOTEROL FUMARATE DIHYDRATE 2 PUFF(S): 160; 4.5 AEROSOL RESPIRATORY (INHALATION) at 09:00

## 2022-01-02 RX ADMIN — Medication 100 MILLIGRAM(S): at 17:30

## 2022-01-02 RX ADMIN — DULOXETINE HYDROCHLORIDE 20 MILLIGRAM(S): 30 CAPSULE, DELAYED RELEASE ORAL at 12:53

## 2022-01-02 RX ADMIN — TAMSULOSIN HYDROCHLORIDE 0.4 MILLIGRAM(S): 0.4 CAPSULE ORAL at 21:38

## 2022-01-02 RX ADMIN — PANTOPRAZOLE SODIUM 40 MILLIGRAM(S): 20 TABLET, DELAYED RELEASE ORAL at 12:53

## 2022-01-02 RX ADMIN — Medication 650 MILLIGRAM(S): at 07:33

## 2022-01-02 RX ADMIN — SODIUM CHLORIDE 1 GRAM(S): 9 INJECTION INTRAMUSCULAR; INTRAVENOUS; SUBCUTANEOUS at 13:10

## 2022-01-02 RX ADMIN — ENOXAPARIN SODIUM 70 MILLIGRAM(S): 100 INJECTION SUBCUTANEOUS at 05:20

## 2022-01-02 RX ADMIN — Medication 2: at 12:52

## 2022-01-02 RX ADMIN — Medication 650 MILLIGRAM(S): at 05:30

## 2022-01-02 RX ADMIN — SIMVASTATIN 20 MILLIGRAM(S): 20 TABLET, FILM COATED ORAL at 21:38

## 2022-01-02 RX ADMIN — Medication 25 MICROGRAM(S): at 05:19

## 2022-01-02 RX ADMIN — Medication 100 MILLIGRAM(S): at 05:19

## 2022-01-02 RX ADMIN — Medication 3: at 08:57

## 2022-01-02 RX ADMIN — SODIUM CHLORIDE 1 GRAM(S): 9 INJECTION INTRAMUSCULAR; INTRAVENOUS; SUBCUTANEOUS at 21:39

## 2022-01-02 RX ADMIN — Medication 2: at 17:27

## 2022-01-02 RX ADMIN — INSULIN GLARGINE 5 UNIT(S): 100 INJECTION, SOLUTION SUBCUTANEOUS at 22:31

## 2022-01-02 RX ADMIN — BUDESONIDE AND FORMOTEROL FUMARATE DIHYDRATE 2 PUFF(S): 160; 4.5 AEROSOL RESPIRATORY (INHALATION) at 22:26

## 2022-01-02 RX ADMIN — Medication 650 MILLIGRAM(S): at 00:10

## 2022-01-02 RX ADMIN — PANTOPRAZOLE SODIUM 40 MILLIGRAM(S): 20 TABLET, DELAYED RELEASE ORAL at 22:00

## 2022-01-02 NOTE — RAPID RESPONSE TEAM SUMMARY - NSOTHERINTERVENTIONSRRT_GEN_ALL_CORE
Primary team to follow up lab results for possible GI bleed.  Would obtain orthostatic BP measurements.   RRT ended with patient in stable condition.

## 2022-01-02 NOTE — PROGRESS NOTE ADULT - ASSESSMENT
Patient is a 77 y/o M--with a PMHx of hypothyroidism on Synthroid, essential HTN on Lopressor, aldactone, depression on Seroquel and Cymbalta, CAD with S/P cardiac catheterization by Dr. Daryl Patrick at [facility] in Nov 2019, type 2 DM on Januvia and bedtime Levemir, with the patient followed by his PCP above and apparently has had upper nasal congestion and loose BM for the past 3 weeks, with the son concerned with increased confusion and decreased ADL and exertional dyspnoea for the past 3 weeks, but worsening for the past few days, with the patient with an apparent outpatient lab assay with hyponatremia and was sent to the ER by his PCP, apparently with prior episodes of hyponatremia, under the presumption of the hyponatremia as the primary mechanism of the confusional state.  Patient had just received his booster dose of the COVID-10 Moderna 7 days ago.   No fever, no chills, no rigors.  NO sore throat but with upper nasal congestion as above.  NO neck pain.  No dyspnoea at present.  NO chest pain/pressure.  NO HA, no focal weakness.  No diaphoresis.      # ?GIB  --RRT called this morning due to AMS and weakness following a dark BM   --Patient started on PPI IV 40mg daily  --GI Consult placed  --Monitor HgB closely, monitor for signs and symptoms of bleeding  --Hgb down trending from 11.0--9.9--7.5; AC was put on hold this morning by RRT   --CTA with Circumferential wall thickening with periduodenal fat stranding may represent enteritis.--F/U GI recommendations   --Diet was made CLD; If patient is going to be scoped, will have to be made NPO at midnight tonight --Per GI   --Please check CBC Q8 hours and monitor levels closely       #COVID-19  --CT head ordered--NO acute bleed or mass.     --Presently NOT requiring supplemental O2 to warrant Decadron.     --Patient is currently saturating 98% on room air  --Discussed with ID attending Dr. Cintron, patient is not in need of Dexamethasone   --Per ID, can continue with Remdesivir for a total of 5 days (Started on 12/28) -- monitor renal function and LFTs   --Monitor inflammatory markers   --Continue with Symbicort 160  --Monitor patient clinically O2 saturation, vital signs and temperature curve       #Elevated D-Dimer  --D-Dimer noted to be 1326 today 12/31 from 341 12/28  --CT Angio ordered to R/O PE, negative for PE  --VA Duplex LE ordered to R/O DVT   --Will start patient on full dose Lovenox 75mg BID until imaging results-- Has been stopped this morning S/P RRT due to concern for GIB ; HgB is downtrending 11.0--9.9--7.5   --Continue to trend D-Dimer       #Hyponatremia.   --CT head   --Continue with Fluid restriction; Monitor Na+ and daily weights.    --Renal evaluation, F/U Recommendations  --Started on Salt tab (Sodium chloride) per renal   --Continue to hold aldactone  --Cymbalta, and Seroquel can be resumed per nephrology; monitor QTC   --Monitor BMP daily -- Na noted to be 130 12/31; F/U nephrology recommendations   --Likely due to SIADH; S/P Tolvaptan 12/30, Salt tabs have been resumed.  --F/U repeat Urine Na and Osmolality, monitor Serum Na on AM labs        #CAD (coronary artery disease).   --Upgraded to telemetry to exclude cardiac equivalent.  --Continue with Aspirin   --Continue with Simvastatin   --Cardio consult PRN   --Check ECHO -- EF of 65%       #Type 2 diabetes mellitus.   --Continue with ISS for now  --Patient was on bedtime Lantus at home  --Continue to monitor FS and serum glucose levels  --Avoid Hypo/Hyperglycemia  --A1C -- 7.1  --Patient will need outpatient follow up for DM management and control   --Endocrinology consultation PRN       #Productive cough  --Patient reporting productive cough with white sputum  --Sputum culture-- Moderate polymorphonuclear leukocytes per low power field, Few Squamous epithelial cells per low power field, Numerous Gram Positive Cocci in Pairs and Chains seen per oil power field  --Throat culture --Negative   --Tessalon perle for cough  --Cough syrup PRN for cough   --Per patient, cough and sore throat have been improving       #Sore throat  --Patient endorsing sore throat  --Throat culture-- negative   --Tylenol for pain control   --Per patient, cough and sore throat have been improving       #HTN  --Monitor Vital Signs and BP closely  --Continue with home medication Lopressor for now  --Aldactone is on hold as may cause HypoNa  --Monitor BP   --Check Orthostatics  --Cardiology consult placed-- F/U recommendations       #Loose bowels  --Per patient experienced loose bowel outpatient   --If occurs and of watery consistency check for C.Diff   --Likely due to COVID  --Blood culture X 2 with No growth   --Urine culture with probable contamination and patient is currently asymptomatic  --Continue to monitor       #Depression  --Per nephrology can resume home medications-- Seroquel and Cymbalta that were previously on hold due to hyponatremia   --Continue to monitor patient and monitor QTC   --Psych consult PRN       #Hypothyroidism   --TSH on this admission-- WNL at 2.78  --Continue with home dose Synthroid       #Bradycardia   --Patient with bradycardia on tele   --At time of visit, patient was asymptomatic   --Continue to monitor on Tele and close monitoring of patient     #Need for prophylactic measure.   -- DVT prophylaxis currently on hold     --Patient will need outpatient follow up for CT Head findings  Patient is a 77 y/o M--with a PMHx of hypothyroidism on Synthroid, essential HTN on Lopressor, aldactone, depression on Seroquel and Cymbalta, CAD with S/P cardiac catheterization by Dr. Daryl Patrick at [facility] in Nov 2019, type 2 DM on Januvia and bedtime Levemir, with the patient followed by his PCP above and apparently has had upper nasal congestion and loose BM for the past 3 weeks, with the son concerned with increased confusion and decreased ADL and exertional dyspnoea for the past 3 weeks, but worsening for the past few days, with the patient with an apparent outpatient lab assay with hyponatremia and was sent to the ER by his PCP, apparently with prior episodes of hyponatremia, under the presumption of the hyponatremia as the primary mechanism of the confusional state.  Patient had just received his booster dose of the COVID-10 Moderna 7 days ago.   No fever, no chills, no rigors.  NO sore throat but with upper nasal congestion as above.  NO neck pain.  No dyspnoea at present.  NO chest pain/pressure.  NO HA, no focal weakness.  No diaphoresis.      # ?GIB  --RRT called this morning due to AMS and weakness following a dark BM   --Patient started on PPI IV 40mg daily  --GI Consult placed  --Monitor HgB closely, monitor for signs and symptoms of bleeding  --Hgb down trending from 11.0--9.9--7.5; AC was put on hold this morning by RRT   --CTA with Circumferential wall thickening with periduodenal fat stranding may represent enteritis.--F/U GI recommendations   --Diet was made CLD; If patient is going to be scoped, will have to be made NPO at midnight tonight --Per GI   --Please check CBC Q8 hours and monitor levels closely   --F/U Occult -- Currently pending       #? Cellulitis   --CTA with --Focal subcutaneous fat stranding along the upper right anterior abdominal wall; correlate for cellulitis.  --Upon exam, non erythematous, edematous  --Continue to monitor patient clinically closely  --Serial abdominal examinations     #COVID-19  --CT head ordered--NO acute bleed or mass.     --Presently NOT requiring supplemental O2 to warrant Decadron.     --Patient is currently saturating 98% on room air  --Discussed with ID attending Dr. Cintron, patient is not in need of Dexamethasone   --Per ID, can continue with Remdesivir for a total of 5 days (Started on 12/28) -- monitor renal function and LFTs   --Monitor inflammatory markers   --Continue with Symbicort 160  --Monitor patient clinically O2 saturation, vital signs and temperature curve       #Elevated D-Dimer  --D-Dimer noted to be 1326 today 12/31 from 341 12/28  --CT Angio ordered to R/O PE, negative for PE  --VA Duplex LE ordered to R/O DVT   --Will start patient on full dose Lovenox 75mg BID until imaging results-- Has been stopped this morning S/P RRT due to concern for GIB ; HgB is downtrending 11.0--9.9--7.5   --Continue to trend D-Dimer       #Hyponatremia.   --CT head   --Continue with Fluid restriction; Monitor Na+ and daily weights.    --Renal evaluation, F/U Recommendations  --Started on Salt tab (Sodium chloride) per renal   --Continue to hold aldactone  --Cymbalta, and Seroquel can be resumed per nephrology; monitor QTC   --Monitor BMP daily -- Na noted to be 130 12/31; F/U nephrology recommendations   --Likely due to SIADH; S/P Tolvaptan 12/30, Salt tabs have been resumed.  --F/U repeat Urine Na and Osmolality, monitor Serum Na on AM labs        #CAD (coronary artery disease).   --Upgraded to telemetry to exclude cardiac equivalent.  --Continue with Aspirin   --Continue with Simvastatin   --Cardio consult PRN   --Check ECHO -- EF of 65%       #Type 2 diabetes mellitus.   --Continue with ISS for now  --Patient was on bedtime Lantus at home  --Continue to monitor FS and serum glucose levels  --Avoid Hypo/Hyperglycemia  --A1C -- 7.1  --Patient will need outpatient follow up for DM management and control   --Endocrinology consultation PRN   --Glucose levels noted to be elevated, will hold off on medication adjustment for now as patient may need to be NPO for further evaluation of ? GIB. If persistently elevated, would start Lantus 5 at bedtime.        #Productive cough  --Patient reporting productive cough with white sputum  --Sputum culture-- Moderate polymorphonuclear leukocytes per low power field, Few Squamous epithelial cells per low power field, Numerous Gram Positive Cocci in Pairs and Chains seen per oil power field  --Throat culture --Negative   --Tessalon perle for cough  --Cough syrup PRN for cough   --Per patient, cough and sore throat have been improving       #Sore throat  --Patient endorsing sore throat  --Throat culture-- negative   --Tylenol for pain control   --Per patient, cough and sore throat have been improving       #HTN  --Monitor Vital Signs and BP closely  --Continue with home medication Lopressor for now  --Aldactone is on hold as may cause HypoNa  --Monitor BP   --Check Orthostatics  --Cardiology consult placed-- F/U recommendations       #Loose bowels  --Per patient experienced loose bowel outpatient   --If occurs and of watery consistency check for C.Diff   --Likely due to COVID  --Blood culture X 2 with No growth   --Urine culture with probable contamination and patient is currently asymptomatic  --Continue to monitor       #Depression  --Per nephrology can resume home medications-- Seroquel and Cymbalta that were previously on hold due to hyponatremia   --Continue to monitor patient and monitor QTC   --Psych consult PRN       #Hypothyroidism   --TSH on this admission-- WNL at 2.78  --Continue with home dose Synthroid       #Bradycardia   --Patient with bradycardia on tele   --At time of visit, patient was asymptomatic   --Continue to monitor on Tele and close monitoring of patient     #Need for prophylactic measure.   -- DVT prophylaxis currently on hold     --Patient will need outpatient follow up for CT Head findings

## 2022-01-02 NOTE — RAPID RESPONSE TEAM SUMMARY - NSSITUATIONBACKGROUNDRRT_GEN_ALL_CORE
76yoM PMH of CAD/MI, T2DM, Diverticulosis, hx of hyponatremia with last Na+ 121 on 12/21 p/w increased confusion and lethargy. pt tends to drink frequently, recent change in meds. Also Covid+ on RA.  RRT called this morning for altered mental status. Patient was having bowel movement when he developed a blank stare. He did not fall or lose consciousness. He slowly regained awareness and was brought back to bed, with initial confusion that then resolved. Vital signs were stable (BP 120s/70s, HR 60s, SpO2 100% on RA). Upon looking in toilet, noted dark stools.     EKG obtained, was normal.  Carlos CBC, BMP, Mg, Phos, VBG w lactate.  Primary team at bedside to follow up lab results and assess for possible GI bleed.

## 2022-01-02 NOTE — PROGRESS NOTE ADULT - ASSESSMENT
76y Male with history of HTN, DM, depression presents with confusion. Nephrology consulted for hyponatremia.    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 Serum Na improving s/p tolvaptan 15 mg PO X 1 on 12/30 and s/p 2g NaCl PO x1 on 1/1. c/w sodium chloride 1 gram TID. Continue with 1L FR and glucerna to increase osmolar intake. AM cortisol wnl. Repeat urine studies consistent with SIADH. Monitor serum Na.    2) HTN: BP controlled. Continue with current medications. Monitor BP.    3) LE edema: Patient appears euvolemic and TTE unremarkable. Unclear why patient on aldactone. Regardless, would hold for now. Monitor UO.    4) Depression: On cymbalta and seroquel. Both medications have been reported to cause hyponatremia however frequency is generally low. No renal objection to continue medications as needed for depression.    [facility] NEPHROLOGY  Tr Begum M.D.  Kelechi Chaudhry D.O.  Mariam Bell M.D.  Radha Burnett, DIANA, ANP-C    Telephone: [phone]  Facsimile: [phone]    [address] [place] [address]

## 2022-01-02 NOTE — PROGRESS NOTE ADULT - SUBJECTIVE AND OBJECTIVE BOX
[facility] NEPHROLOGY- PROGRESS NOTE    76y Male with history of HTN, DM, depression presents with confusion. Nephrology consulted for hyponatremia.    s/p RRT this am for confusion and dark stools.     REVIEW OF SYSTEMS:  Gen: no changes in weight  Cards: no chest pain  Resp: no dyspnea, + cough  GI: no nausea or vomiting or diarrhea +dark stools  Vascular: no LE edema    No Known Allergies      Hospital Medications: Medications reviewed    VITALS:  T(F): 98.3 (22 @ 03:03), Max: 98.5 (22 @ 17:18)  HR: 75 (22 @ 03:03)  BP: 116/69 (22 @ 03:03)  RR: 18 (22 @ 03:03)  SpO2: 98% (22 @ 03:03)  Wt(kg): --     @ 07:01  -   @ 07:00  --------------------------------------------------------  IN: 560 mL / OUT: 240 mL / NET: 320 mL      PHYSICAL EXAM:    Gen: NAD,   Cards: RRR, +S1/S2, no M/G/R  Resp: CTA ant  GI: soft, NT/ND, NABS  Vascular: no LE edema B/L    LABS:    134 <--, 134 <--, 131 <--, 130 <--, 126 <--, 126 <--, 121 <--  134<L>  |  105  |  47<H>  ----------------------------<  292<H>  5.0   |  16<L>  |  1.22    Ca    8.0<L>      2022 07:30    TPro  5.5<L>  /  Alb  2.9<L>  /  TBili  0.2  /  DBili  <0.1  /  AST  10  /  ALT  11  /  AlkPhos  46      Creatinine Trend: 1.22 <--, 1.24 <--, 1.24 <--, 1.08 <--, 1.05 <--, 1.02 <--, 1.17 <--, 1.14 <--, 1.25 <--, 1.21 <--, 1.27 <--, 1.28 <--                        7.5    7.30  )-----------( 262      ( 2022 07:30 )             22.9     Urine Studies:  Urinalysis Basic - ( 27 Dec 2021 16:48 )    Color: Light Yellow / Appearance: Clear / S.011 / pH:   Gluc:  / Ketone: Negative  / Bili: Negative / Urobili: Negative   Blood:  / Protein: 30 mg/dL / Nitrite: Negative   Leuk Esterase: Negative / RBC: 1 /hpf / WBC 1 /HPF   Sq Epi:  / Non Sq Epi: 0 /hpf / Bacteria: Negative      Osmolality, Random Urine: 572 mos/kg ( @ 15:00)  Sodium, Random Urine: 49 mmol/L ( @ 07:10)  Sodium, Random Urine: 91 mmol/L ( @ 01:15)  Osmolality, Random Urine: 390 mos/kg ( @ 01:15)  Potassium, Random Urine: 30 mmol/L ( @ 01:15)  Creatinine, Random Urine: 54 mg/dL ( @ 01:15)

## 2022-01-02 NOTE — CHART NOTE - NSCHARTNOTEFT_GEN_A_CORE
Medicine NP note    Notified by RN that RRT called as patient felt week and contused in bathroom after having BM.  RRt team at  bedside, patient a&ox4, intact neuro status  C/o having dark stool  Stat labs sent by RRT team, and therapeutic lovenox d/cd by RRt team concerning GIB    Vital Signs Last 24 Hrs  T(C): 36.8 (02 Jan 2022 03:03), Max: 36.9 (01 Jan 2022 17:18)  T(F): 98.3 (02 Jan 2022 03:03), Max: 98.5 (01 Jan 2022 17:18)  HR: 75 (02 Jan 2022 03:03) (59 - 87)  BP: 116/69 (02 Jan 2022 03:03) (111/65 - 135/63)  BP(mean): --  RR: 18 (02 Jan 2022 03:03) (18 - 18)  SpO2: 98% (02 Jan 2022 03:03) (96% - 99%)                          9.9    5.81  )-----------( 273      ( 01 Jan 2022 07:10 )             29.7     < from: Transthoracic Echocardiogram (12.28.21 @ 07:52) >    Technically difficult study.  Normal left ventricular systolic function. No segmental  wall motion abnormalities.    < end of copied text >    A/P    Patient is a 77 y/o M--with a PMHx of hypothyroidism on Synthroid, essential HTN on Lopressor, aldactone, depression on Seroquel and Cymbalta, CAD with S/P cardiac catheterization by Dr. Daryl Patrick at [place] in Nov 2019, type 2 DM on Januvia and bedtime Levemir, admitted for Covid 19  On FD Lovenox marge elevated Ddimer, LE doppler pending  ]Now RRT for feeling week and confused  Patient back to baseline mental stratus  OFF AC now  F/U CBC, FOBT results  Patient on Cardizem 300mg Po daily  Consider cardiology consult   ?decrease dose of Cardizem, orthostatic pending when pt more stable  Expedite doppler today  Will discuss with Attending  Will sign out to day team    Jose Elias Jo [facility] [place]  [id number] Medicine NP note    Notified by RN that RRT called as patient felt week and contused in bathroom after having BM.  RRt team at  bedside, patient a&ox4, intact neuro status  C/o having dark stool  Stat labs sent by RRT team, and recommended to d/d therapeutic lovenox  by RRt team concerning GIB    Vital Signs Last 24 Hrs  T(C): 36.8 (02 Jan 2022 03:03), Max: 36.9 (01 Jan 2022 17:18)  T(F): 98.3 (02 Jan 2022 03:03), Max: 98.5 (01 Jan 2022 17:18)  HR: 75 (02 Jan 2022 03:03) (59 - 87)  BP: 116/69 (02 Jan 2022 03:03) (111/65 - 135/63)  BP(mean): --  RR: 18 (02 Jan 2022 03:03) (18 - 18)  SpO2: 98% (02 Jan 2022 03:03) (96% - 99%)                          9.9    5.81  )-----------( 273      ( 01 Jan 2022 07:10 )             29.7     < from: Transthoracic Echocardiogram (12.28.21 @ 07:52) >    Technically difficult study.  Normal left ventricular systolic function. No segmental  wall motion abnormalities.    < end of copied text >    A/P    Patient is a 77 y/o M--with a PMHx of hypothyroidism on Synthroid, essential HTN on Lopressor, aldactone, depression on Seroquel and Cymbalta, CAD with S/P cardiac catheterization by Dr. Daryl Patrick at [place] in Nov 2019, type 2 DM on Januvia and bedtime Levemir, admitted for Covid 19  On FD Lovenox marge elevated Ddimer, LE doppler pending  ]Now RRT for feeling week and confused  Patient back to baseline mental stratus  Lovenox d/cd OFF AC now  F/U CBC, FOBT results  Patient on Cardizem 300mg Po daily   cardiology consult am  ?decrease dose of Cardizem, orthostatic pending when pt more stable  discussed with Attending  Will sign out to day team    Jose Elias Jo [facility]  [id number]

## 2022-01-02 NOTE — CONSULT NOTE ADULT - SUBJECTIVE AND OBJECTIVE BOX
Santosh Granados MD  Interventional Cardiology / Endovascular Specialist  [place] Office : [address] [place] [address] [place] [address]  Tel:   [place] Office : [address] [facility] [place] [address]  Tel: [phone]  Cell : [phone]      HISTORY OF PRESENTING ILLNESS:  77 y/o M--patient seen with patient's adult son in attendance (son is an RN at [employer]) and is bilingual and declined ED video  device--patient with a history of hypothyroidism on Synthroid, essential HTN on Lopressor, aldactone, depression on Seroquel and Cymbalta, CAD with S/P cardiac catheterization by Dr. Daryl Patrick at [place] in Nov 2019, type 2 DM on Januvia and bedtime Levemir, with the patient followed by his PCP above and apparently has had upper nasal congestion and loose BM for the past 3 weeks, with the son concerned with increased confusion and decreased ADL and exertional dyspnoea for the past 3 weeks, but worsening for the past few days, with the patient with an apparent outpatient lab assay with hyponatremia and was sent to the ER by his PCP, apparently with prior episodes of hyponatremia, under the presumption of the hyponatremia as the primary mechanism of the confusional state.  Patient had just received his booster dose of the COVID-10 Moderna 7 days ago.   No fever, no chills, no rigors.  NO sore throat but with upper nasal congestion as above.  NO neck pain.  No dyspnoea at present.  NO chest pain/pressure.  NO HA, no focal weakness.  No diaphoresis.    PAST MEDICAL & SURGICAL HISTORY:  Hypertension    Hyperlipidemia    Diabetes  type 2    CAD (coronary artery disease)  cardiac stents x7    Stented coronary artery    Depression    Thyroid disease    Benign hypertrophy of prostate    Lumbar disc disorder    MI (myocardial infarction)  12/2014    Diverticulitis    OA (osteoarthritis)    Lumbar radiculopathy    2019 novel coronavirus disease (COVID-19)    S/P knee replacement, bilateral    S/P shoulder surgery  s/p R rotator cuff surgery    History of cardiac catheterization  2010 2 stents, 2012 3 stents, 12/2014 2 coronary artery stents    History of back surgery  lumbar    H/O heart artery stent    S/P laminectomy with spinal fusion  x 2        SOCIAL HISTORY: Substance Use (street drugs): ( x ) never used  (  ) other:    FAMILY HISTORY:  Family history of heart disease (Sibling)        REVIEW OF SYSTEMS:  CONSTITUTIONAL: No fever, weight loss, or fatigue  EYES: No eye pain, visual disturbances, or discharge  ENMT:  No difficulty hearing, tinnitus, vertigo; No sinus or throat pain  BREASTS: No pain, masses, or nipple discharge  GASTROINTESTINAL: dark stools   GENITOURINARY: No dysuria, frequency, hematuria, or incontinence  NEUROLOGICAL: No headaches, memory loss, loss of strength, numbness, or tremors  ENDOCRINE: No heat or cold intolerance; No hair loss  MUSCULOSKELETAL: No joint pain or swelling; No muscle, back, or extremity pain  PSYCHIATRIC: No depression, anxiety, mood swings, or difficulty sleeping  HEME/LYMPH: No easy bruising, or bleeding gums  All others negative    MEDICATIONS:  diltiazem    milliGRAM(s) Oral daily  metoprolol tartrate 100 milliGRAM(s) Oral two times a day  tamsulosin 0.4 milliGRAM(s) Oral at bedtime      benzonatate 100 milliGRAM(s) Oral every 8 hours PRN  budesonide 160 MICROgram(s)/formoterol 4.5 MICROgram(s) Inhaler 2 Puff(s) Inhalation two times a day  hydrocodone/homatropine Syrup 5 milliLiter(s) Oral every 6 hours PRN    acetaminophen     Tablet .. 650 milliGRAM(s) Oral every 4 hours PRN  DULoxetine 20 milliGRAM(s) Oral daily  QUEtiapine 25 milliGRAM(s) Oral at bedtime    pantoprazole  Injectable 40 milliGRAM(s) IV Push two times a day    dextrose 40% Gel 15 Gram(s) Oral once  dextrose 50% Injectable 25 Gram(s) IV Push once  dextrose 50% Injectable 12.5 Gram(s) IV Push once  dextrose 50% Injectable 25 Gram(s) IV Push once  glucagon  Injectable 1 milliGRAM(s) IntraMuscular once  insulin glargine Injectable (LANTUS) 5 Unit(s) SubCutaneous at bedtime  insulin lispro (ADMELOG) corrective regimen sliding scale   SubCutaneous three times a day before meals  insulin lispro (ADMELOG) corrective regimen sliding scale   SubCutaneous at bedtime  levothyroxine 25 MICROGram(s) Oral daily  simvastatin 20 milliGRAM(s) Oral at bedtime    dextrose 5%. 1000 milliLiter(s) IV Continuous <Continuous>  dextrose 5%. 1000 milliLiter(s) IV Continuous <Continuous>  sodium chloride 1 Gram(s) Oral three times a day      FAMILY HISTORY:  Family history of heart disease (Sibling)          Allergies    No Known Allergies    Intolerances    	      PHYSICAL EXAM:  T(C): 36.8 (01-02-22 @ 21:10), Max: 37 (01-02-22 @ 15:01)  HR: 87 (01-02-22 @ 21:10) (75 - 92)  BP: 116/76 (01-02-22 @ 21:10) (116/69 - 127/72)  RR: 18 (01-02-22 @ 21:10) (17 - 18)  SpO2: 98% (01-02-22 @ 21:10) (98% - 100%)  Wt(kg): --  I&O's Summary    01 Jan 2022 07:01  -  02 Jan 2022 07:00  --------------------------------------------------------  IN: 560 mL / OUT: 240 mL / NET: 320 mL    02 Jan 2022 07:01  -  03 Jan 2022 00:37  --------------------------------------------------------  IN: 200 mL / OUT: 250 mL / NET: -50 mL        GENERAL: NAD  EYES: EOMI, PERRLA, conjunctiva and sclera clear  ENMT: No tonsillar erythema, exudates, or enlargement  Cardiovascular: Normal S1 S2, No JVD, No murmurs, No edema  Respiratory: Lungs clear to auscultation	  Gastrointestinal:  Soft, Non-tender, + BS	  Extremities: No edema      LABS:	 	    CARDIAC MARKERS:                                  7.5    6.39  )-----------( 244      ( 02 Jan 2022 22:31 )             22.3     01-02    134<L>  |  105  |  47<H>  ----------------------------<  292<H>  5.0   |  16<L>  |  1.22    Ca    8.0<L>      02 Jan 2022 07:30    TPro  5.5<L>  /  Alb  2.9<L>  /  TBili  0.2  /  DBili  <0.1  /  AST  10  /  ALT  11  /  AlkPhos  46  01-02    proBNP:   Lipid Profile:   HgA1c:   TSH:     Consultant(s) Notes Reviewed:  [x ] YES  [ ] NO    Care Discussed with Consultants/Other Providers [ x] YES  [ ] NO    Imaging Personally Reviewed independently:  [x] YES  [ ] NO    All labs, radiologic studies, vitals, orders and medications list reviewed. Patient is seen and examined at bedside. Case discussed with medical team.    ASSESSMENT/PLAN: 	  
[facility]  Rodrigo Arriola PA-C  [facility] [address]  [phone]      Chief Complaint:  Patient is a 76y old  Male who presents with a chief complaint of Upper nasal congestion and loose BM for 3 weeks, increased confusion and decreased ADL, GOTTLIEB for the past 2 days. (02 Jan 2022 11:39)      HPI:Patient is a 77 y/o M--with a PMHx of hypothyroidism on Synthroid, essential HTN on Lopressor, aldactone, depression on Seroquel and Cymbalta, CAD with S/P cardiac catheterization by Dr. Daryl Patrick at [place] in Nov 2019, type 2 DM on Januvia and bedtime Levemir, with the patient followed by his PCP above and apparently has had upper nasal congestion and loose BM for the past 3 weeks, with the son concerned with increased confusion and decreased ADL and exertional dyspnoea for the past 3 weeks,    Allergies:  No Known Allergies      Medications:  acetaminophen     Tablet .. 650 milliGRAM(s) Oral every 4 hours PRN  benzonatate 100 milliGRAM(s) Oral every 8 hours PRN  budesonide 160 MICROgram(s)/formoterol 4.5 MICROgram(s) Inhaler 2 Puff(s) Inhalation two times a day  dextrose 40% Gel 15 Gram(s) Oral once  dextrose 5%. 1000 milliLiter(s) IV Continuous <Continuous>  dextrose 5%. 1000 milliLiter(s) IV Continuous <Continuous>  dextrose 50% Injectable 25 Gram(s) IV Push once  dextrose 50% Injectable 12.5 Gram(s) IV Push once  dextrose 50% Injectable 25 Gram(s) IV Push once  diltiazem    milliGRAM(s) Oral daily  DULoxetine 20 milliGRAM(s) Oral daily  glucagon  Injectable 1 milliGRAM(s) IntraMuscular once  hydrocodone/homatropine Syrup 5 milliLiter(s) Oral every 6 hours PRN  insulin glargine Injectable (LANTUS) 5 Unit(s) SubCutaneous at bedtime  insulin lispro (ADMELOG) corrective regimen sliding scale   SubCutaneous three times a day before meals  insulin lispro (ADMELOG) corrective regimen sliding scale   SubCutaneous at bedtime  levothyroxine 25 MICROGram(s) Oral daily  metoprolol tartrate 100 milliGRAM(s) Oral two times a day  pantoprazole  Injectable 40 milliGRAM(s) IV Push two times a day  QUEtiapine 25 milliGRAM(s) Oral at bedtime  simvastatin 20 milliGRAM(s) Oral at bedtime  sodium chloride 1 Gram(s) Oral three times a day  tamsulosin 0.4 milliGRAM(s) Oral at bedtime      PMHX/PSHX:  Hypertension    Hyperlipidemia    Diabetes    CAD (coronary artery disease)    Stented coronary artery    Depression    Thyroid disease    Benign hypertrophy of prostate    Lumbar disc disorder    MI (myocardial infarction)    Diverticulitis    OA (osteoarthritis)    Hyponatremia    Anginal pain    Lumbar radiculopathy    2019 novel coronavirus disease (COVID-19)    S/P knee replacement, bilateral    S/P shoulder surgery    History of cardiac catheterization    History of back surgery    H/O heart artery stent    S/P laminectomy with spinal fusion        Family history:  Family history of heart disease (Sibling)        Social History:     ROS:     General:  No wt loss, fevers, chills, night sweats, fatigue,   Eyes:  Good vision, no reported pain  ENT:  No sore throat, pain, runny nose, dysphagia  CV:  No pain, palpitations, hypo/hypertension  Resp:  No dyspnea, cough, tachypnea, wheezing  GI:  No pain, No nausea, No vomiting, No diarrhea, No constipation, No weight loss, No fever, No pruritis, No rectal bleeding, No tarry stools, No dysphagia,  :  No pain, bleeding, incontinence, nocturia  Muscle:  No pain, weakness  Neuro:  No weakness, tingling, memory problems  Psych:  No fatigue, insomnia, mood problems, depression  Endocrine:  No polyuria, polydipsia, cold/heat intolerance  Heme:  No petechiae, ecchymosis, easy bruisability  Skin:  No rash, tattoos, scars, edema      PHYSICAL EXAM:   Vital Signs:  Vital Signs Last 24 Hrs  T(C): 36.8 (02 Jan 2022 03:03), Max: 36.9 (01 Jan 2022 17:18)  T(F): 98.3 (02 Jan 2022 03:03), Max: 98.5 (01 Jan 2022 17:18)  HR: 75 (02 Jan 2022 03:03) (74 - 87)  BP: 116/69 (02 Jan 2022 03:03) (111/65 - 127/67)  BP(mean): --  RR: 18 (02 Jan 2022 03:03) (18 - 18)  SpO2: 98% (02 Jan 2022 03:03) (96% - 99%)  Daily     Daily     GENERAL:  Appears stated age,   HEENT:  NC/AT,    CHEST:  Full & symmetric excursion,   HEART:  Regular rhythm  ABDOMEN:  Soft, non-tender, non-distended,   EXTEREMITIES:  no cyanosis,clubbing or edema  SKIN:  No rash  NEURO:  Alert,    LABS:                        7.5    6.48  )-----------( 263      ( 02 Jan 2022 12:19 )             22.4     01-02    134<L>  |  105  |  47<H>  ----------------------------<  292<H>  5.0   |  16<L>  |  1.22    Ca    8.0<L>      02 Jan 2022 07:30    TPro  5.5<L>  /  Alb  2.9<L>  /  TBili  0.2  /  DBili  <0.1  /  AST  10  /  ALT  11  /  AlkPhos  46  01-02    LIVER FUNCTIONS - ( 02 Jan 2022 07:13 )  Alb: 2.9 g/dL / Pro: 5.5 g/dL / ALK PHOS: 46 U/L / ALT: 11 U/L / AST: 10 U/L / GGT: x           PT/INR - ( 02 Jan 2022 07:13 )   PT: 16.3 sec;   INR: 1.38 ratio                 Imaging:          
HPI:  76 m with DM, HTN, HLD, CAD, hypothyroidism, depression, has had nasal congestion, cough and loose BMs with some GOTTLIEB and confusion, found to have hyponatremia so sent to ER   here afebrile, on RA, normal WBC, hyponatremia to 119  COVID positive, CXR negative  head CT: Abnormal area of low-attenuation involving the right centrum semiovale region. Age-indeterminate lacunar infarct is seen involving the left thalamus.    PAST MEDICAL & SURGICAL HISTORY:  Hypertension    Hyperlipidemia    Diabetes  type 2    CAD (coronary artery disease)  cardiac stents x7    Stented coronary artery    Depression    Thyroid disease    Benign hypertrophy of prostate    Lumbar disc disorder    MI (myocardial infarction)  2014    Diverticulitis    OA (osteoarthritis)    Lumbar radiculopathy     novel coronavirus disease (COVID-19)    S/P knee replacement, bilateral    S/P shoulder surgery  s/p R rotator cuff surgery    History of cardiac catheterization   2 stents,  3 stents, 2014 2 coronary artery stents    History of back surgery  lumbar    H/O heart artery stent    S/P laminectomy with spinal fusion  x 2        Allergies    No Known Allergies    Intolerances        ANTIMICROBIALS:  remdesivir  IVPB    remdesivir  IVPB 200 every 24 hours      OTHER MEDS:  acetaminophen     Tablet .. 650 milliGRAM(s) Oral every 4 hours PRN  aspirin enteric coated 81 milliGRAM(s) Oral daily  budesonide 160 MICROgram(s)/formoterol 4.5 MICROgram(s) Inhaler 2 Puff(s) Inhalation two times a day  dextrose 40% Gel 15 Gram(s) Oral once  dextrose 5%. 1000 milliLiter(s) IV Continuous <Continuous>  dextrose 5%. 1000 milliLiter(s) IV Continuous <Continuous>  dextrose 50% Injectable 25 Gram(s) IV Push once  dextrose 50% Injectable 12.5 Gram(s) IV Push once  dextrose 50% Injectable 25 Gram(s) IV Push once  diltiazem    milliGRAM(s) Oral daily  enoxaparin Injectable 40 milliGRAM(s) SubCutaneous daily  glucagon  Injectable 1 milliGRAM(s) IntraMuscular once  insulin lispro (ADMELOG) corrective regimen sliding scale   SubCutaneous three times a day before meals  insulin lispro (ADMELOG) corrective regimen sliding scale   SubCutaneous at bedtime  levothyroxine 50 MICROGram(s) Oral daily  metoprolol tartrate 100 milliGRAM(s) Oral two times a day  simvastatin 20 milliGRAM(s) Oral at bedtime  tamsulosin 0.4 milliGRAM(s) Oral at bedtime      SOCIAL HISTORY:  from [place], , lives with family  no smoking, alcohol or drug abuse  no recent travel    FAMILY HISTORY:  Family history of heart disease (Sibling)        ROS:    All other systems negative     Constitutional: no fever, no chills  Eye: no eye pain, no redness, no vision changes  ENT:  no sore throat, no rhinorrhea  Cardiovascular:  no chest pain, no palpitation  Respiratory:  no SOB, + cough  GI:  no abd pain, no vomiting, had diarrhea but no BMs in the hospital   urinary: no dysuria, no hematuria, no flank pain  : no penile discharge or bleeding  musculoskeletal:  no joint pain, no joint swelling  skin:  no rash  neurology:  no headache, no seizure, no change in mental status  psych: no anxiety, no depression     Physical Exam:    General:    NAD, non toxic, sitting on a chair  Head: atraumatic, normocephalic  Eyes: normal sclera and conjunctiva  ENT:   no oropharyngeal lesions, no LAD, neck supple  Cardio:    regular S1,S2, no murmur  Respiratory:   clear b/l, no wheezing  abd:   soft, BS +, not tender  :     no CVAT, no suprapubic tenderness, no shoemaker  Musculoskeletal : no joint swelling, no edema  Skin:    no rash  vascular: no phlebitis  Neurologic:     no focal deficits  psych: normal affect      Drug Dosing Weight  Height (cm): 165.1 (27 Dec 2021 14:40)  Weight (kg): 74.8 (27 Dec 2021 14:40)  BMI (kg/m2): 27.4 (27 Dec 2021 14:40)  BSA (m2): 1.82 (27 Dec 2021 14:40)    Vital Signs Last 24 Hrs  T(F): 98.6 (21 @ 05:35), Max: 98.8 (21 @ 14:40)    Vital Signs Last 24 Hrs  HR: 78 (21 @ 05:35) (78 - 90)  BP: 160/78 (21 @ 05:35) (136/78 - 187/75)  RR: 18 (21 @ 05:35)  SpO2: 100% (21 @ 05:35) (96% - 100%)  Wt(kg): --                          10.4   6.14  )-----------( 260      ( 28 Dec 2021 02:07 )             29.8           121<L>  |  87<L>  |  16  ----------------------------<  150<H>  4.7   |  21<L>  |  1.21    Ca    8.7      28 Dec 2021 06:17  Phos  3.6       Mg     1.8         TPro  7.2  /  Alb  3.9  /  TBili  0.3  /  DBili  <0.1  /  AST  19  /  ALT  19  /  AlkPhos  62        Urinalysis Basic - ( 27 Dec 2021 16:48 )    Color: Light Yellow / Appearance: Clear / S.011 / pH: x  Gluc: x / Ketone: Negative  / Bili: Negative / Urobili: Negative   Blood: x / Protein: 30 mg/dL / Nitrite: Negative   Leuk Esterase: Negative / RBC: 1 /hpf / WBC 1 /HPF   Sq Epi: x / Non Sq Epi: 0 /hpf / Bacteria: Negative        MICROBIOLOGY:  v              RADIOLOGY:    Images independently visualized and reviewed personally,  findings as below    < from: CT Head No Cont (21 @ 21:58) >  IMPRESSION: Abnormal area of low-attenuation involving the right centrum   semiovale region.    Age-indeterminate lacunar infarct is seen involving the left thalamus.    < end of copied text >  < from: Xray Chest 1 View- PORTABLE-Urgent (Xray Chest 1 View- PORTABLE-Urgent .) (21 @ 15:26) >  IMPRESSION:  Clear lungs.    < end of copied text >  
[facility] NEPHROLOGY- CONSULTATION NOTE    76y Male with history of below presents with confusion. Nephrology consulted for hyponatremia.    Patient admits to prior history of hyponatremia which was treated in the past with sodium chloride tablets and fluid restriction. Patient admits to drinking 8-10 glasses of fluid daily. Patient denies any nausea, vomiting or diarrhea. Patient is on aldactone, cymbalta and seroquel as an outpatient?    REVIEW OF SYSTEMS:  Gen: no changes in weight  HEENT: no rhinorrhea  Neck: no sore throat  Cards: no chest pain  Resp: no dyspnea, + cough  GI: no nausea or vomiting or diarrhea  : no dysuria or hematuria  Vascular: no LE edema  Derm: no rashes  Neuro: no numbness/tingling    No Known Allergies      Home Medications Reviewed  Hospital Medications:   MEDICATIONS  (STANDING):  aspirin enteric coated 81 milliGRAM(s) Oral daily  budesonide 160 MICROgram(s)/formoterol 4.5 MICROgram(s) Inhaler 2 Puff(s) Inhalation two times a day  dextrose 40% Gel 15 Gram(s) Oral once  dextrose 5%. 1000 milliLiter(s) (50 mL/Hr) IV Continuous <Continuous>  dextrose 5%. 1000 milliLiter(s) (100 mL/Hr) IV Continuous <Continuous>  dextrose 50% Injectable 25 Gram(s) IV Push once  dextrose 50% Injectable 12.5 Gram(s) IV Push once  dextrose 50% Injectable 25 Gram(s) IV Push once  diltiazem    milliGRAM(s) Oral daily  enoxaparin Injectable 40 milliGRAM(s) SubCutaneous daily  glucagon  Injectable 1 milliGRAM(s) IntraMuscular once  insulin glargine Injectable (LANTUS) 5 Unit(s) SubCutaneous at bedtime  insulin lispro (ADMELOG) corrective regimen sliding scale   SubCutaneous three times a day before meals  insulin lispro (ADMELOG) corrective regimen sliding scale   SubCutaneous at bedtime  levothyroxine 25 MICROGram(s) Oral daily  metoprolol tartrate 100 milliGRAM(s) Oral two times a day  remdesivir  IVPB   IV Intermittent   remdesivir  IVPB 100 milliGRAM(s) IV Intermittent every 24 hours  simvastatin 20 milliGRAM(s) Oral at bedtime  sodium chloride 1 Gram(s) Oral three times a day  tamsulosin 0.4 milliGRAM(s) Oral at bedtime      PAST MEDICAL & SURGICAL HISTORY:  Hypertension    Hyperlipidemia    Diabetes  type 2    CAD (coronary artery disease)  cardiac stents x7    Stented coronary artery    Depression    Thyroid disease    Benign hypertrophy of prostate    Lumbar disc disorder    MI (myocardial infarction)  2014    Diverticulitis    OA (osteoarthritis)    Lumbar radiculopathy     novel coronavirus disease (COVID-19)    S/P knee replacement, bilateral    S/P shoulder surgery  s/p R rotator cuff surgery    History of cardiac catheterization   2 stents,  3 stents, 2014 2 coronary artery stents    History of back surgery  lumbar    H/O heart artery stent    S/P laminectomy with spinal fusion  x 2        FAMILY HISTORY:  Family history of heart disease (Sibling)        SOCIAL HISTORY:  Denies toxic substance use     VITALS:  T(F): 98.1 (21 @ 10:55), Max: 99.2 (21 @ 20:28)  HR: 49 (21 @ 10:55)  BP: 129/70 (21 @ 10:55)  RR: 18 (21 @ 10:55)  SpO2: 98% (21 @ 10:55)  Wt(kg): --     @ 07:01  -   @ 07:00  --------------------------------------------------------  IN: 360 mL / OUT: 800 mL / NET: -440 mL          PHYSICAL EXAM:  Gen: NAD, calm  HEENT: MMM  Neck: no JVD  Cards: RRR, +S1/S2, no M/G/R  Resp: course BS B/L  GI: soft, NT/ND, NABS  : no CVA tenderness  Vascular: no LE edema B/L  Derm: no rashes  Neuro: non-focal    LABS:      126<L>  |  92<L>  |  21  ----------------------------<  186<H>  4.5   |  20<L>  |  1.14    Ca    8.5      29 Dec 2021 13:12  Phos  3.6       Mg     1.8         TPro  7.2  /  Alb  3.7  /  TBili  0.2  /  DBili  <0.1  /  AST  24  /  ALT  20  /  AlkPhos  60      Creatinine Trend: 1.14 <--, 1.25 <--, 1.21 <--, 1.27 <--, 1.28 <--                        11.0   4.26  )-----------( 260      ( 29 Dec 2021 06:38 )             31.6     Urine Studies:  Urinalysis Basic - ( 27 Dec 2021 16:48 )    Color: Light Yellow / Appearance: Clear / S.011 / pH:   Gluc:  / Ketone: Negative  / Bili: Negative / Urobili: Negative   Blood:  / Protein: 30 mg/dL / Nitrite: Negative   Leuk Esterase: Negative / RBC: 1 /hpf / WBC 1 /HPF   Sq Epi:  / Non Sq Epi: 0 /hpf / Bacteria: Negative      Sodium, Random Urine: 91 mmol/L ( @ 01:15)  Osmolality, Random Urine: 390 mos/kg ( @ 01:15)  Potassium, Random Urine: 30 mmol/L ( @ 01:15)  Creatinine, Random Urine: 54 mg/dL ( @ 01:15)      RADIOLOGY & ADDITIONAL STUDIES:    < from: CT Head No Cont (21 @ 21:58) >  IMPRESSION: Abnormal area of low-attenuation involving the right centrum   semiovale region.    Age-indeterminate lacunar infarct is seen involving the left thalamus.    < end of copied text >      < from: Xray Chest 1 View- PORTABLE-Urgent (Xray Chest 1 View- PORTABLE-Urgent .) (21 @ 15:26) >  IMPRESSION:  Clear lungs.    < end of copied text >

## 2022-01-02 NOTE — PROGRESS NOTE ADULT - SUBJECTIVE AND OBJECTIVE BOX
Name of Patient : JERRI SIDHU  MRN: [id number]  Date of visit: 01-02-22 @ 11:41      Subjective: Patient seen and examined. No new events except as noted. Patient is S/P RRT this morning due to Altered mental status and feeling week following a bowel movement of dark stool.  At time of visit, patient was back to his baseline.   Patient reports that his cough is improving. Denies chest pain, palpitations, shortness of breath or difficulty breathing.   Patient is currently afebrile and saturating 98% on room air.     REVIEW OF SYSTEMS:    CONSTITUTIONAL: +Weakness this morning; RRT called   ENT: +Throat pain is improving   NECK: No pain or stiffness  RESPIRATORY: +Cough is improving; denies shortness of breath or difficulty breathing   CARDIOVASCULAR: No chest pain or palpitations  GASTROINTESTINAL: +Dark stool this morning   GENITOURINARY: No dysuria, frequency or hematuria  NEUROLOGICAL: No numbness or weakness  SKIN: No itching, burning, rashes, or lesions   All other review of systems is negative unless indicated above.    MEDICATIONS:  MEDICATIONS  (STANDING):  aspirin enteric coated 81 milliGRAM(s) Oral daily  budesonide 160 MICROgram(s)/formoterol 4.5 MICROgram(s) Inhaler 2 Puff(s) Inhalation two times a day  dextrose 40% Gel 15 Gram(s) Oral once  dextrose 5%. 1000 milliLiter(s) (50 mL/Hr) IV Continuous <Continuous>  dextrose 5%. 1000 milliLiter(s) (100 mL/Hr) IV Continuous <Continuous>  dextrose 50% Injectable 25 Gram(s) IV Push once  dextrose 50% Injectable 12.5 Gram(s) IV Push once  dextrose 50% Injectable 25 Gram(s) IV Push once  diltiazem    milliGRAM(s) Oral daily  DULoxetine 20 milliGRAM(s) Oral daily  glucagon  Injectable 1 milliGRAM(s) IntraMuscular once  insulin glargine Injectable (LANTUS) 5 Unit(s) SubCutaneous at bedtime  insulin lispro (ADMELOG) corrective regimen sliding scale   SubCutaneous three times a day before meals  insulin lispro (ADMELOG) corrective regimen sliding scale   SubCutaneous at bedtime  levothyroxine 25 MICROGram(s) Oral daily  metoprolol tartrate 100 milliGRAM(s) Oral two times a day  pantoprazole  Injectable 40 milliGRAM(s) IV Push daily  QUEtiapine 25 milliGRAM(s) Oral at bedtime  simvastatin 20 milliGRAM(s) Oral at bedtime  sodium chloride 1 Gram(s) Oral three times a day  tamsulosin 0.4 milliGRAM(s) Oral at bedtime      PHYSICAL EXAM:  T(C): 36.8 (01-02-22 @ 03:03), Max: 36.9 (01-01-22 @ 17:18)  HR: 75 (01-02-22 @ 03:03) (59 - 87)  BP: 116/69 (01-02-22 @ 03:03) (111/65 - 135/63)  RR: 18 (01-02-22 @ 03:03) (18 - 18)  SpO2: 98% (01-02-22 @ 03:03) (96% - 99%)  Wt(kg): --  I&O's Summary    01 Jan 2022 07:01  -  02 Jan 2022 07:00  --------------------------------------------------------  IN: 560 mL / OUT: 240 mL / NET: 320 mL          Appearance: Calm, laying in bed   HEENT:  PERRLA   Lymphatic: No lymphadenopathy   Cardiovascular: Normal S1 S2, no JVD  Respiratory: normal effort , clear  Gastrointestinal:  Soft, Non-tender  Skin: No rashes,  warm to touch  Psychiatry:  Mood & affect appropriate  Musculuskeletal: No edema    01-01-22 @ 07:01  -  01-02-22 @ 07:00  --------------------------------------------------------  IN: 560 mL / OUT: 240 mL / NET: 320 mL                              7.5    7.30  )-----------( 262      ( 02 Jan 2022 07:30 )             22.9               01-02    134<L>  |  105  |  47<H>  ----------------------------<  292<H>  5.0   |  16<L>  |  1.22    Ca    8.0<L>      02 Jan 2022 07:30    TPro  5.5<L>  /  Alb  2.9<L>  /  TBili  0.2  /  DBili  <0.1  /  AST  10  /  ALT  11  /  AlkPhos  46  01-02    PT/INR - ( 02 Jan 2022 07:13 )   PT: 16.3 sec;   INR: 1.38 ratio             < from: CT Angio Chest PE Protocol w/ IV Cont (12.31.21 @ 21:57) >  IMPRESSION:  No pulmonary embolism. Clear lungs.    Circumferential wall thickening with periduodenal fat stranding may   represent enteritis.    Focal subcutaneous fat stranding along the upper right anterior abdominal   wall; correlate for cellulitis.    --- End of Report ---    < end of copied text >        Culture - Group A Streptococcus (12.28.21 @ 21:45)   Specimen Source: .Throat Throat   Culture Results: No Streptococcus pyogenes (Group A) isolated     Culture - Sputum . (12.28.21 @ 21:43)   Gram Stain: Moderate polymorphonuclear leukocytes per low power field   Few Squamous epithelial cells per low power field   Numerous Gram Positive Cocci in Pairs and Chains seen per oil power field   Specimen Source: .Sputum Sputum   Culture Results: Moderate Haemophilus parainfluenzae "Susceptibilities not performed"   Normal Respiratory Denise present     Culture - Blood (12.28.21 @ 03:21)   Specimen Source: .Blood Blood-Venous   Culture Results: No Growth Final     Culture - Blood (12.28.21 @ 03:21)   Specimen Source: .Blood Blood-Venous   Culture Results: No Growth Final     Culture - Urine (12.27.21 @ 19:17)   Specimen Source: Clean Catch Clean Catch (Midstream)   Culture Results: >=3 organisms. Probable collection contamination.                      Name of Patient : JERRI SIDHU  MRN: [id number]  Date of visit: 01-02-22 @ 11:41      Subjective: Patient seen and examined. No new events except as noted. Patient is S/P RRT this morning due to Altered mental status and feeling week following a bowel movement of dark stool.  At time of visit, patient was back to his baseline.   Patient reports that his cough is improving. Denies chest pain, palpitations, shortness of breath or difficulty breathing.   Patient is currently afebrile and saturating 98% on room air.     REVIEW OF SYSTEMS:    CONSTITUTIONAL: +Weakness this morning; RRT called   ENT: +Throat pain is improving   NECK: No pain or stiffness  RESPIRATORY: +Cough is improving; denies shortness of breath or difficulty breathing   CARDIOVASCULAR: No chest pain or palpitations  GASTROINTESTINAL: +Dark stool this morning   GENITOURINARY: No dysuria, frequency or hematuria  NEUROLOGICAL: No numbness or weakness  SKIN: No itching, burning, rashes, or lesions   All other review of systems is negative unless indicated above.    MEDICATIONS:  MEDICATIONS  (STANDING):  aspirin enteric coated 81 milliGRAM(s) Oral daily  budesonide 160 MICROgram(s)/formoterol 4.5 MICROgram(s) Inhaler 2 Puff(s) Inhalation two times a day  dextrose 40% Gel 15 Gram(s) Oral once  dextrose 5%. 1000 milliLiter(s) (50 mL/Hr) IV Continuous <Continuous>  dextrose 5%. 1000 milliLiter(s) (100 mL/Hr) IV Continuous <Continuous>  dextrose 50% Injectable 25 Gram(s) IV Push once  dextrose 50% Injectable 12.5 Gram(s) IV Push once  dextrose 50% Injectable 25 Gram(s) IV Push once  diltiazem    milliGRAM(s) Oral daily  DULoxetine 20 milliGRAM(s) Oral daily  glucagon  Injectable 1 milliGRAM(s) IntraMuscular once  insulin glargine Injectable (LANTUS) 5 Unit(s) SubCutaneous at bedtime  insulin lispro (ADMELOG) corrective regimen sliding scale   SubCutaneous three times a day before meals  insulin lispro (ADMELOG) corrective regimen sliding scale   SubCutaneous at bedtime  levothyroxine 25 MICROGram(s) Oral daily  metoprolol tartrate 100 milliGRAM(s) Oral two times a day  pantoprazole  Injectable 40 milliGRAM(s) IV Push daily  QUEtiapine 25 milliGRAM(s) Oral at bedtime  simvastatin 20 milliGRAM(s) Oral at bedtime  sodium chloride 1 Gram(s) Oral three times a day  tamsulosin 0.4 milliGRAM(s) Oral at bedtime      PHYSICAL EXAM:  T(C): 36.8 (01-02-22 @ 03:03), Max: 36.9 (01-01-22 @ 17:18)  HR: 75 (01-02-22 @ 03:03) (59 - 87)  BP: 116/69 (01-02-22 @ 03:03) (111/65 - 135/63)  RR: 18 (01-02-22 @ 03:03) (18 - 18)  SpO2: 98% (01-02-22 @ 03:03) (96% - 99%)  Wt(kg): --  I&O's Summary    01 Jan 2022 07:01  -  02 Jan 2022 07:00  --------------------------------------------------------  IN: 560 mL / OUT: 240 mL / NET: 320 mL          Appearance: Calm, laying in bed   HEENT:  PERRLA   Lymphatic: No lymphadenopathy   Cardiovascular: Normal S1 S2, no JVD  Respiratory: normal effort , clear  Gastrointestinal:  Soft, Non-tender. Non-erythematous, edematous  Skin: No erythema, edema, swelling, No rashes,  warm to touch  Psychiatry:  Mood & affect appropriate  Musculuskeletal: No edema    01-01-22 @ 07:01  -  01-02-22 @ 07:00  --------------------------------------------------------  IN: 560 mL / OUT: 240 mL / NET: 320 mL                              7.5    7.30  )-----------( 262      ( 02 Jan 2022 07:30 )             22.9               01-02    134<L>  |  105  |  47<H>  ----------------------------<  292<H>  5.0   |  16<L>  |  1.22    Ca    8.0<L>      02 Jan 2022 07:30    TPro  5.5<L>  /  Alb  2.9<L>  /  TBili  0.2  /  DBili  <0.1  /  AST  10  /  ALT  11  /  AlkPhos  46  01-02    PT/INR - ( 02 Jan 2022 07:13 )   PT: 16.3 sec;   INR: 1.38 ratio             < from: CT Angio Chest PE Protocol w/ IV Cont (12.31.21 @ 21:57) >  IMPRESSION:  No pulmonary embolism. Clear lungs.    Circumferential wall thickening with periduodenal fat stranding may   represent enteritis.    Focal subcutaneous fat stranding along the upper right anterior abdominal   wall; correlate for cellulitis.    --- End of Report ---    < end of copied text >        Culture - Group A Streptococcus (12.28.21 @ 21:45)   Specimen Source: .Throat Throat   Culture Results: No Streptococcus pyogenes (Group A) isolated     Culture - Sputum . (12.28.21 @ 21:43)   Gram Stain: Moderate polymorphonuclear leukocytes per low power field   Few Squamous epithelial cells per low power field   Numerous Gram Positive Cocci in Pairs and Chains seen per oil power field   Specimen Source: .Sputum Sputum   Culture Results: Moderate Haemophilus parainfluenzae "Susceptibilities not performed"   Normal Respiratory Denise present     Culture - Blood (12.28.21 @ 03:21)   Specimen Source: .Blood Blood-Venous   Culture Results: No Growth Final     Culture - Blood (12.28.21 @ 03:21)   Specimen Source: .Blood Blood-Venous   Culture Results: No Growth Final     Culture - Urine (12.27.21 @ 19:17)   Specimen Source: Clean Catch Clean Catch (Midstream)   Culture Results: >=3 organisms. Probable collection contamination.

## 2022-01-02 NOTE — CONSULT NOTE ADULT - ASSESSMENT
76y Male with history of HTN, DM, depression presents with confusion. Nephrology consulted for hyponatremia.    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 Serum Na improving. Continue with sodium chloride 1 gram TID and 1L FR. Start glucerna to increase osmolar intake. Check cortisol, serum uric acid and repeat urine sodium and urine osm. Will consider tolvaptan pending results. Monitor serum Na.    2) HTN: BP controlled. Continue with current medications. Monitor BP.    3) LE edema: Patient appears euvolemic and TTE unremarkable. Unclear why patient on aldactone. Would regardless hold for now. Monitor UO.    4) Depression: On cymbalta and seroquel as an outpatient which have been discontinued here? Both medications have been reported to cause hyponatremia however frequency is generally low. No renal objection to resume medications as needed for depression.    [facility] NEPHROLOG[facility]  Tr Begum M.D.  Kelechi Chaudhry D.O.  Mariam Bell M.D.  Radha Burnett, MSN, ANP-C    Telephone: [phone]  Facsimile: [phone]    [address] [place] [address]  
EKG SR LVH    Echo < from: Transthoracic Echocardiogram (12.28.21 @ 07:52) >  Mitral Valve: Mitral annular calcification.  Aortic Valve/Aorta: Calcified aortic valve with normal  opening. Mild aortic regurgitation.  Normal aortic root.  Left Atrium: Normal left atrium.  Left Ventricle: Normal left ventricular internal dimensions  and wall thicknesses.  Normal left ventricular systolic function. No segmental  wall motion abnormalities.  Impaired LV-relaxation with normal filling pressure.  Right Heart: Normal right atrium. Normal right ventricular  size andsystolic function.  Normal tricuspid valve. Normal pulmonic valve.  Pericardium/Pleura: Normal pericardium with no pericardial  effusion.  Hemodynamic: Estimated right atrial pressure is normal.  No evidence of pulmonary hypertension.    < end of copied text >      Assessment and Plan     1)  COVID 19 t/t per primary team , lovenox and asa held 2/2 GI bleed     2)  CAD : denies CP , SOB, asa held 2/2 GI bleed echo normal LV     3)  GI bleed GI on board f/u recs 
76 m with DM, HTN, HLD, CAD, hypothyroidism, depression, has had nasal congestion, cough with throat pain while coughing, loose BMs with some GOTTLIEB and confusion, found to have hyponatremia so sent to ER   here afebrile, on RA, normal WBC, hyponatremia to 119  COVID positive, CXR negative  head CT: Abnormal area of low-attenuation involving the right centrum semiovale region. Age-indeterminate lacunar infarct is seen involving the left thalamus.    COVID but s/p vaccination x 3, no fever, clear lungs and on RA, has a chronic cough and GOTTLIEB  diarrhea, seems to have resolved in the hospital, could be due to COVID  hyponatremia    * pt was started on remdesivir, will continue for now  * monitor the renal function and LFTs  * pt on RA so no need for dexamethasone  * hyponatremia management as per the primary team    The above assessment and plan was discussed with the primary team    Denise Cintron MD  Pager [phone]  After 5pm and on weekends call [phone]
gi bleed  acute blood loss anemia  covid    patient was started on full dose a/c  CTA negative for PE  a/c was dced  this am had drop in hgb with dark stool  suspected duodenal ulcer  CT showing thickening  will start PPI bid  cont clears  asa on hold  transfuse to hgb >8  cbc bid  will follow  paulw patient

## 2022-01-03 LAB
ALBUMIN SERPL ELPH-MCNC: 2.7 G/DL — LOW (ref 3.3–5)
ALBUMIN SERPL ELPH-MCNC: 2.8 G/DL — LOW (ref 3.3–5)
ALP SERPL-CCNC: 40 U/L — SIGNIFICANT CHANGE UP (ref 40–120)
ALP SERPL-CCNC: 41 U/L — SIGNIFICANT CHANGE UP (ref 40–120)
ALT FLD-CCNC: 11 U/L — SIGNIFICANT CHANGE UP (ref 10–45)
ALT FLD-CCNC: 12 U/L — SIGNIFICANT CHANGE UP (ref 10–45)
ANION GAP SERPL CALC-SCNC: 11 MMOL/L — SIGNIFICANT CHANGE UP (ref 5–17)
AST SERPL-CCNC: 10 U/L — SIGNIFICANT CHANGE UP (ref 10–40)
AST SERPL-CCNC: 10 U/L — SIGNIFICANT CHANGE UP (ref 10–40)
BILIRUB DIRECT SERPL-MCNC: 0.1 MG/DL — SIGNIFICANT CHANGE UP (ref 0–0.3)
BILIRUB INDIRECT FLD-MCNC: 0.4 MG/DL — SIGNIFICANT CHANGE UP (ref 0.2–1)
BILIRUB SERPL-MCNC: 0.5 MG/DL — SIGNIFICANT CHANGE UP (ref 0.2–1.2)
BILIRUB SERPL-MCNC: 0.5 MG/DL — SIGNIFICANT CHANGE UP (ref 0.2–1.2)
BUN SERPL-MCNC: 49 MG/DL — HIGH (ref 7–23)
CALCIUM SERPL-MCNC: 8 MG/DL — LOW (ref 8.4–10.5)
CHLORIDE SERPL-SCNC: 108 MMOL/L — SIGNIFICANT CHANGE UP (ref 96–108)
CO2 SERPL-SCNC: 19 MMOL/L — LOW (ref 22–31)
CREAT SERPL-MCNC: 1.29 MG/DL — SIGNIFICANT CHANGE UP (ref 0.5–1.3)
CREAT SERPL-MCNC: 1.3 MG/DL — SIGNIFICANT CHANGE UP (ref 0.5–1.3)
GLUCOSE SERPL-MCNC: 221 MG/DL — HIGH (ref 70–99)
HCT VFR BLD CALC: 22.7 % — LOW (ref 39–50)
HCT VFR BLD CALC: 25 % — LOW (ref 39–50)
HCT VFR BLD CALC: 25.9 % — LOW (ref 39–50)
HGB BLD-MCNC: 7.9 G/DL — LOW (ref 13–17)
HGB BLD-MCNC: 8.4 G/DL — LOW (ref 13–17)
HGB BLD-MCNC: 8.6 G/DL — LOW (ref 13–17)
INR BLD: 1.33 RATIO — HIGH (ref 0.88–1.16)
MAGNESIUM SERPL-MCNC: 1.7 MG/DL — SIGNIFICANT CHANGE UP (ref 1.6–2.6)
MCHC RBC-ENTMCNC: 31.4 PG — SIGNIFICANT CHANGE UP (ref 27–34)
MCHC RBC-ENTMCNC: 31.7 PG — SIGNIFICANT CHANGE UP (ref 27–34)
MCHC RBC-ENTMCNC: 31.9 PG — SIGNIFICANT CHANGE UP (ref 27–34)
MCHC RBC-ENTMCNC: 33.2 GM/DL — SIGNIFICANT CHANGE UP (ref 32–36)
MCHC RBC-ENTMCNC: 33.6 GM/DL — SIGNIFICANT CHANGE UP (ref 32–36)
MCHC RBC-ENTMCNC: 34.8 GM/DL — SIGNIFICANT CHANGE UP (ref 32–36)
MCV RBC AUTO: 91.5 FL — SIGNIFICANT CHANGE UP (ref 80–100)
MCV RBC AUTO: 94.3 FL — SIGNIFICANT CHANGE UP (ref 80–100)
MCV RBC AUTO: 94.5 FL — SIGNIFICANT CHANGE UP (ref 80–100)
NRBC # BLD: 0 /100 WBCS — SIGNIFICANT CHANGE UP (ref 0–0)
PHOSPHATE SERPL-MCNC: 3.9 MG/DL — SIGNIFICANT CHANGE UP (ref 2.5–4.5)
PLATELET # BLD AUTO: 243 K/UL — SIGNIFICANT CHANGE UP (ref 150–400)
PLATELET # BLD AUTO: 245 K/UL — SIGNIFICANT CHANGE UP (ref 150–400)
PLATELET # BLD AUTO: 248 K/UL — SIGNIFICANT CHANGE UP (ref 150–400)
POTASSIUM SERPL-MCNC: 4.3 MMOL/L — SIGNIFICANT CHANGE UP (ref 3.5–5.3)
POTASSIUM SERPL-SCNC: 4.3 MMOL/L — SIGNIFICANT CHANGE UP (ref 3.5–5.3)
PROT SERPL-MCNC: 4.9 G/DL — LOW (ref 6–8.3)
PROT SERPL-MCNC: 5.1 G/DL — LOW (ref 6–8.3)
PROTHROM AB SERPL-ACNC: 15.7 SEC — HIGH (ref 10.6–13.6)
RBC # BLD: 2.48 M/UL — LOW (ref 4.2–5.8)
RBC # BLD: 2.65 M/UL — LOW (ref 4.2–5.8)
RBC # BLD: 2.74 M/UL — LOW (ref 4.2–5.8)
RBC # FLD: 15.6 % — HIGH (ref 10.3–14.5)
RBC # FLD: 16.8 % — HIGH (ref 10.3–14.5)
RBC # FLD: 17 % — HIGH (ref 10.3–14.5)
SODIUM SERPL-SCNC: 138 MMOL/L — SIGNIFICANT CHANGE UP (ref 135–145)
WBC # BLD: 6.09 K/UL — SIGNIFICANT CHANGE UP (ref 3.8–10.5)
WBC # BLD: 6.32 K/UL — SIGNIFICANT CHANGE UP (ref 3.8–10.5)
WBC # BLD: 6.59 K/UL — SIGNIFICANT CHANGE UP (ref 3.8–10.5)
WBC # FLD AUTO: 6.09 K/UL — SIGNIFICANT CHANGE UP (ref 3.8–10.5)
WBC # FLD AUTO: 6.32 K/UL — SIGNIFICANT CHANGE UP (ref 3.8–10.5)
WBC # FLD AUTO: 6.59 K/UL — SIGNIFICANT CHANGE UP (ref 3.8–10.5)

## 2022-01-03 PROCEDURE — 93970 EXTREMITY STUDY: CPT | Mod: 26

## 2022-01-03 RX ORDER — ASPIRIN/CALCIUM CARB/MAGNESIUM 324 MG
81 TABLET ORAL DAILY
Refills: 0 | Status: DISCONTINUED | OUTPATIENT
Start: 2022-01-03 | End: 2022-01-03

## 2022-01-03 RX ORDER — INSULIN GLARGINE 100 [IU]/ML
10 INJECTION, SOLUTION SUBCUTANEOUS AT BEDTIME
Refills: 0 | Status: DISCONTINUED | OUTPATIENT
Start: 2022-01-03 | End: 2022-01-06

## 2022-01-03 RX ADMIN — Medication 300 MILLIGRAM(S): at 05:44

## 2022-01-03 RX ADMIN — Medication 100 MILLIGRAM(S): at 05:44

## 2022-01-03 RX ADMIN — Medication 25 MICROGRAM(S): at 05:44

## 2022-01-03 RX ADMIN — Medication 2: at 21:49

## 2022-01-03 RX ADMIN — SODIUM CHLORIDE 1 GRAM(S): 9 INJECTION INTRAMUSCULAR; INTRAVENOUS; SUBCUTANEOUS at 05:44

## 2022-01-03 RX ADMIN — QUETIAPINE FUMARATE 25 MILLIGRAM(S): 200 TABLET, FILM COATED ORAL at 21:48

## 2022-01-03 RX ADMIN — BUDESONIDE AND FORMOTEROL FUMARATE DIHYDRATE 2 PUFF(S): 160; 4.5 AEROSOL RESPIRATORY (INHALATION) at 22:30

## 2022-01-03 RX ADMIN — Medication 2: at 12:25

## 2022-01-03 RX ADMIN — Medication 2: at 17:22

## 2022-01-03 RX ADMIN — BUDESONIDE AND FORMOTEROL FUMARATE DIHYDRATE 2 PUFF(S): 160; 4.5 AEROSOL RESPIRATORY (INHALATION) at 08:17

## 2022-01-03 RX ADMIN — DULOXETINE HYDROCHLORIDE 20 MILLIGRAM(S): 30 CAPSULE, DELAYED RELEASE ORAL at 12:26

## 2022-01-03 RX ADMIN — SODIUM CHLORIDE 1 GRAM(S): 9 INJECTION INTRAMUSCULAR; INTRAVENOUS; SUBCUTANEOUS at 21:48

## 2022-01-03 RX ADMIN — PANTOPRAZOLE SODIUM 40 MILLIGRAM(S): 20 TABLET, DELAYED RELEASE ORAL at 05:44

## 2022-01-03 RX ADMIN — Medication 100 MILLIGRAM(S): at 17:28

## 2022-01-03 RX ADMIN — Medication 100 MILLIGRAM(S): at 22:38

## 2022-01-03 RX ADMIN — INSULIN GLARGINE 10 UNIT(S): 100 INJECTION, SOLUTION SUBCUTANEOUS at 21:49

## 2022-01-03 RX ADMIN — Medication 2: at 08:16

## 2022-01-03 RX ADMIN — SODIUM CHLORIDE 1 GRAM(S): 9 INJECTION INTRAMUSCULAR; INTRAVENOUS; SUBCUTANEOUS at 15:01

## 2022-01-03 RX ADMIN — PANTOPRAZOLE SODIUM 40 MILLIGRAM(S): 20 TABLET, DELAYED RELEASE ORAL at 17:28

## 2022-01-03 RX ADMIN — TAMSULOSIN HYDROCHLORIDE 0.4 MILLIGRAM(S): 0.4 CAPSULE ORAL at 21:48

## 2022-01-03 RX ADMIN — SIMVASTATIN 20 MILLIGRAM(S): 20 TABLET, FILM COATED ORAL at 21:48

## 2022-01-03 NOTE — PROGRESS NOTE ADULT - SUBJECTIVE AND OBJECTIVE BOX
[facility] NEPHROLOGY- PROGRESS NOTE    76y Male with history of HTN, DM, depression presents with confusion. Nephrology consulted for hyponatremia.    : s/p RRT this am for confusion and dark stools.     1/3: Pt NPO for EGD 2/2 GIB.  He has had sips of water.  He is s/p 1 unit PRBC, planned for 2nd unit.  No rise in Hb noted after 1 unit.    REVIEW OF SYSTEMS: unable to obtain full as pt is a bit tired/lethargic this morning. Yesterday pt c/o cough.    Vital Signs Last 24 Hrs  T(C): 36.8 (2022 05:55), Max: 37 (2022 15:01)  T(F): 98.3 (2022 05:55), Max: 98.6 (2022 15:01)  HR: 86 (2022 05:55) (76 - 92)  BP: 126/86 (2022 05:55) (114/70 - 127/72)  RR: 18 (2022 05:55) (17 - 18)  SpO2: 98% (2022 05:55) (97% - 100%)      PHYSICAL EXAM:  Gen: NAD,   Cards: RRR, +S1/S2, no M/G/R  Resp: a few coarse BS bilaterally, but otherwise clear.  No basilar crackles.  GI: soft, NT/ND, NABS  Vascular: no LE edema B/L      LABS:    134 <--, 134 <--, 131 <--, 130 <--, 126 <--, 126 <--, 121 <--  134<L>  |  105  |  47<H>  ----------------------------<  292<H>  5.0   |  16<L>  |  1.22    Ca    8.0<L>      2022 07:30    TPro  5.5<L>  /  Alb  2.9<L>  /  TBili  0.2  /  DBili  <0.1  /  AST  10  /  ALT  11  /  AlkPhos  46      Creatinine Trend: 1.22 <--, 1.24 <--, 1.24 <--, 1.08 <--, 1.05 <--, 1.02 <--, 1.17 <--, 1.14 <--, 1.25 <--, 1.21 <--, 1.27 <--, 1.28 <--                        7.5    6.39  )-----------( 244      ( 2022 22:31 )             22.3     Urine Studies:  Urinalysis Basic - ( 27 Dec 2021 16:48 )    Color: Light Yellow / Appearance: Clear / S.011 / pH:   Gluc:  / Ketone: Negative  / Bili: Negative / Urobili: Negative   Blood:  / Protein: 30 mg/dL / Nitrite: Negative   Leuk Esterase: Negative / RBC: 1 /hpf / WBC 1 /HPF   Sq Epi:  / Non Sq Epi: 0 /hpf / Bacteria: Negative      Osmolality, Random Urine: 572 mos/kg ( @ 15:00)  Sodium, Random Urine: 49 mmol/L ( @ 07:10)  Sodium, Random Urine: 91 mmol/L ( @ 01:15)  Osmolality, Random Urine: 390 mos/kg ( @ 01:15)  Potassium, Random Urine: 30 mmol/L ( @ 01:15)  Creatinine, Random Urine: 54 mg/dL ( @ 01:15)

## 2022-01-03 NOTE — PROGRESS NOTE ADULT - SUBJECTIVE AND OBJECTIVE BOX
[facility]  Rodrigo Arriola PA-C  [facility] [place] [address]  [phone]      INTERVAL HPI/OVERNIGHT EVENTS:  pt seen and examined, as per RN pt has small amount of melena overnight  H/H stable s/p PRBC    MEDICATIONS  (STANDING):  budesonide 160 MICROgram(s)/formoterol 4.5 MICROgram(s) Inhaler 2 Puff(s) Inhalation two times a day  dextrose 40% Gel 15 Gram(s) Oral once  dextrose 5%. 1000 milliLiter(s) (50 mL/Hr) IV Continuous <Continuous>  dextrose 5%. 1000 milliLiter(s) (100 mL/Hr) IV Continuous <Continuous>  dextrose 50% Injectable 25 Gram(s) IV Push once  dextrose 50% Injectable 12.5 Gram(s) IV Push once  dextrose 50% Injectable 25 Gram(s) IV Push once  diltiazem    milliGRAM(s) Oral daily  DULoxetine 20 milliGRAM(s) Oral daily  glucagon  Injectable 1 milliGRAM(s) IntraMuscular once  insulin glargine Injectable (LANTUS) 5 Unit(s) SubCutaneous at bedtime  insulin lispro (ADMELOG) corrective regimen sliding scale   SubCutaneous three times a day before meals  insulin lispro (ADMELOG) corrective regimen sliding scale   SubCutaneous at bedtime  levothyroxine 25 MICROGram(s) Oral daily  metoprolol tartrate 100 milliGRAM(s) Oral two times a day  pantoprazole  Injectable 40 milliGRAM(s) IV Push two times a day  QUEtiapine 25 milliGRAM(s) Oral at bedtime  simvastatin 20 milliGRAM(s) Oral at bedtime  sodium chloride 1 Gram(s) Oral three times a day  tamsulosin 0.4 milliGRAM(s) Oral at bedtime    MEDICATIONS  (PRN):  acetaminophen     Tablet .. 650 milliGRAM(s) Oral every 4 hours PRN Temp greater or equal to 38.5C (101.3F)  benzonatate 100 milliGRAM(s) Oral every 8 hours PRN Cough  hydrocodone/homatropine Syrup 5 milliLiter(s) Oral every 6 hours PRN Cough      Allergies    No Known Allergies    Intolerances        ROS:   General:  No wt loss, fevers, chills, night sweats, fatigue,   Eyes:  Good vision, no reported pain  ENT:  No sore throat, pain, runny nose, dysphagia  CV:  No pain, palpitations, hypo/hypertension  Resp:  No dyspnea, cough, tachypnea, wheezing  GI:  No pain, No nausea, No vomiting, No diarrhea, No constipation, No weight loss, No fever, No pruritis, No rectal bleeding, + tarry stools, No dysphagia,  :  No pain, bleeding, incontinence, nocturia  Muscle:  No pain, weakness  Neuro:  No weakness, tingling, memory problems  Psych:  No fatigue, insomnia, mood problems, depression  Endocrine:  No polyuria, polydipsia, cold/heat intolerance  Heme:  No petechiae, ecchymosis, easy bruisability  Skin:  No rash, tattoos, scars, edema      PHYSICAL EXAM:   Vital Signs:  Vital Signs Last 24 Hrs  T(C): 36.6 (03 Jan 2022 10:53), Max: 37 (02 Jan 2022 15:01)  T(F): 97.9 (03 Jan 2022 10:53), Max: 98.6 (02 Jan 2022 15:01)  HR: 63 (03 Jan 2022 10:53) (63 - 92)  BP: 110/60 (03 Jan 2022 10:53) (110/60 - 127/72)  BP(mean): --  RR: 18 (03 Jan 2022 10:53) (17 - 18)  SpO2: 99% (03 Jan 2022 10:53) (97% - 100%)  Daily     Daily     GENERAL:  Appears stated age,   HEENT:  NC/AT,    CHEST:  Full & symmetric excursion,   HEART:  Regular rhythm,  ABDOMEN:  Soft, non-tender, non-distended,  EXTEREMITIES:  no cyanosis  SKIN:  No rash  NEURO:  Alert,       LABS:                        8.6    6.32  )-----------( 243      ( 03 Jan 2022 07:48 )             25.9     01-03    138  |  108  |  49<H>  ----------------------------<  221<H>  4.3   |  19<L>  |  1.30    Ca    8.0<L>      03 Jan 2022 07:48  Phos  3.9     01-03  Mg     1.7     01-03    TPro  5.1<L>  /  Alb  2.8<L>  /  TBili  0.5  /  DBili  0.1  /  AST  10  /  ALT  12  /  AlkPhos  41  01-03    PT/INR - ( 03 Jan 2022 07:48 )   PT: 15.7 sec;   INR: 1.33 ratio               RADIOLOGY & ADDITIONAL TESTS:

## 2022-01-03 NOTE — PROGRESS NOTE ADULT - SUBJECTIVE AND OBJECTIVE BOX
Name of Patient : JERRI SIDHU  MRN: [id number]  Date of visit: 01-03-22 @ 13:09      Subjective: Patient seen and examined. No new events except as noted.     REVIEW OF SYSTEMS:    CONSTITUTIONAL: No weakness, fevers or chills  EYES/ENT: No visual changes;  No vertigo or throat pain   NECK: No pain or stiffness  RESPIRATORY: No cough, wheezing, hemoptysis; No shortness of breath  CARDIOVASCULAR: No chest pain or palpitations  GASTROINTESTINAL: No abdominal or epigastric pain. No nausea, vomiting, or hematemesis; No diarrhea or constipation. No melena or hematochezia.  GENITOURINARY: No dysuria, frequency or hematuria  NEUROLOGICAL: No numbness or weakness  SKIN: No itching, burning, rashes, or lesions   All other review of systems is negative unless indicated above.    MEDICATIONS:  MEDICATIONS  (STANDING):  budesonide 160 MICROgram(s)/formoterol 4.5 MICROgram(s) Inhaler 2 Puff(s) Inhalation two times a day  dextrose 40% Gel 15 Gram(s) Oral once  dextrose 5%. 1000 milliLiter(s) (50 mL/Hr) IV Continuous <Continuous>  dextrose 5%. 1000 milliLiter(s) (100 mL/Hr) IV Continuous <Continuous>  dextrose 50% Injectable 25 Gram(s) IV Push once  dextrose 50% Injectable 12.5 Gram(s) IV Push once  dextrose 50% Injectable 25 Gram(s) IV Push once  diltiazem    milliGRAM(s) Oral daily  DULoxetine 20 milliGRAM(s) Oral daily  glucagon  Injectable 1 milliGRAM(s) IntraMuscular once  insulin glargine Injectable (LANTUS) 5 Unit(s) SubCutaneous at bedtime  insulin lispro (ADMELOG) corrective regimen sliding scale   SubCutaneous three times a day before meals  insulin lispro (ADMELOG) corrective regimen sliding scale   SubCutaneous at bedtime  levothyroxine 25 MICROGram(s) Oral daily  metoprolol tartrate 100 milliGRAM(s) Oral two times a day  pantoprazole  Injectable 40 milliGRAM(s) IV Push two times a day  QUEtiapine 25 milliGRAM(s) Oral at bedtime  simvastatin 20 milliGRAM(s) Oral at bedtime  sodium chloride 1 Gram(s) Oral three times a day  tamsulosin 0.4 milliGRAM(s) Oral at bedtime      PHYSICAL EXAM:  T(C): 36.6 (01-03-22 @ 10:53), Max: 37 (01-02-22 @ 15:01)  HR: 63 (01-03-22 @ 10:53) (63 - 92)  BP: 110/60 (01-03-22 @ 10:53) (110/60 - 127/72)  RR: 18 (01-03-22 @ 10:53) (17 - 18)  SpO2: 99% (01-03-22 @ 10:53) (97% - 100%)  Wt(kg): --  I&O's Summary    02 Jan 2022 07:01  -  03 Jan 2022 07:00  --------------------------------------------------------  IN: 350 mL / OUT: 625 mL / NET: -275 mL    03 Jan 2022 07:01  -  03 Jan 2022 13:09  --------------------------------------------------------  IN: 0 mL / OUT: 450 mL / NET: -450 mL          Appearance: Normal	  HEENT:  PERRLA   Lymphatic: No lymphadenopathy   Cardiovascular: Normal S1 S2, no JVD  Respiratory: normal effort , clear  Gastrointestinal:  Soft, Non-tender  Skin: No rashes,  warm to touch  Psychiatry:  Mood & affect appropriate  Musculuskeletal: No edema        01-02-22 @ 07:01  -  01-03-22 @ 07:00  --------------------------------------------------------  IN: 350 mL / OUT: 625 mL / NET: -275 mL    01-03-22 @ 07:01  -  01-03-22 @ 13:09  --------------------------------------------------------  IN: 0 mL / OUT: 450 mL / NET: -450 mL                            8.6    6.32  )-----------( 243      ( 03 Jan 2022 07:48 )             25.9               01-03    138  |  108  |  49<H>  ----------------------------<  221<H>  4.3   |  19<L>  |  1.30    Ca    8.0<L>      03 Jan 2022 07:48  Phos  3.9     01-03  Mg     1.7     01-03    TPro  5.1<L>  /  Alb  2.8<L>  /  TBili  0.5  /  DBili  0.1  /  AST  10  /  ALT  12  /  AlkPhos  41  01-03    PT/INR - ( 03 Jan 2022 07:48 )   PT: 15.7 sec;   INR: 1.33 ratio                oOccult Blood, Feces (01.02.22 @ 18:18)   Occult Blood, Feces: Positive                      Name of Patient : JERRI SIDHU  MRN: [id number]  Date of visit: 01-03-22 @ 13:09      Subjective: Patient seen and examined. No new events except as noted.   Patient reports that his cough is improving. Denies chest pain, palpitations, shortness of breath or difficulty breathing.   Patient is currently afebrile and saturating 99% on room air.   patient is S/P 2 units of PRBC transfusion yesterday 01/02.         REVIEW OF SYSTEMS:    CONSTITUTIONAL: No weakness, fevers or chills  EYES/ENT: No visual changes;  No vertigo or throat pain   NECK: No pain or stiffness  RESPIRATORY: +Cough is improving; denies shortness of breath or difficulty breathing   CARDIOVASCULAR: No chest pain or palpitations  GASTROINTESTINAL: No abdominal or epigastric pain. No nausea, vomiting, or hematemesis; No diarrhea or constipation. No melena or hematochezia.  GENITOURINARY: No dysuria, frequency or hematuria  NEUROLOGICAL: No numbness or weakness  SKIN: No itching, burning, rashes, or lesions   All other review of systems is negative unless indicated above.    MEDICATIONS:  MEDICATIONS  (STANDING):  budesonide 160 MICROgram(s)/formoterol 4.5 MICROgram(s) Inhaler 2 Puff(s) Inhalation two times a day  dextrose 40% Gel 15 Gram(s) Oral once  dextrose 5%. 1000 milliLiter(s) (50 mL/Hr) IV Continuous <Continuous>  dextrose 5%. 1000 milliLiter(s) (100 mL/Hr) IV Continuous <Continuous>  dextrose 50% Injectable 25 Gram(s) IV Push once  dextrose 50% Injectable 12.5 Gram(s) IV Push once  dextrose 50% Injectable 25 Gram(s) IV Push once  diltiazem    milliGRAM(s) Oral daily  DULoxetine 20 milliGRAM(s) Oral daily  glucagon  Injectable 1 milliGRAM(s) IntraMuscular once  insulin glargine Injectable (LANTUS) 5 Unit(s) SubCutaneous at bedtime  insulin lispro (ADMELOG) corrective regimen sliding scale   SubCutaneous three times a day before meals  insulin lispro (ADMELOG) corrective regimen sliding scale   SubCutaneous at bedtime  levothyroxine 25 MICROGram(s) Oral daily  metoprolol tartrate 100 milliGRAM(s) Oral two times a day  pantoprazole  Injectable 40 milliGRAM(s) IV Push two times a day  QUEtiapine 25 milliGRAM(s) Oral at bedtime  simvastatin 20 milliGRAM(s) Oral at bedtime  sodium chloride 1 Gram(s) Oral three times a day  tamsulosin 0.4 milliGRAM(s) Oral at bedtime      PHYSICAL EXAM:  T(C): 36.6 (01-03-22 @ 10:53), Max: 37 (01-02-22 @ 15:01)  HR: 63 (01-03-22 @ 10:53) (63 - 92)  BP: 110/60 (01-03-22 @ 10:53) (110/60 - 127/72)  RR: 18 (01-03-22 @ 10:53) (17 - 18)  SpO2: 99% (01-03-22 @ 10:53) (97% - 100%)  Wt(kg): --  I&O's Summary    02 Jan 2022 07:01  -  03 Jan 2022 07:00  --------------------------------------------------------  IN: 350 mL / OUT: 625 mL / NET: -275 mL    03 Jan 2022 07:01  -  03 Jan 2022 13:09  --------------------------------------------------------  IN: 0 mL / OUT: 450 mL / NET: -450 mL          Appearance: Normal; laying in bed   HEENT:  PERRLA   Lymphatic: No lymphadenopathy   Cardiovascular: Normal S1 S2, no JVD  Respiratory: normal effort , clear  Gastrointestinal:  Soft, Non-tender. Non-erythematous, edematous  Skin: No erythema, edema, swelling, No rashes,  warm to touch  Psychiatry:  Mood & affect appropriate  Musculuskeletal: No edema          01-02-22 @ 07:01  -  01-03-22 @ 07:00  --------------------------------------------------------  IN: 350 mL / OUT: 625 mL / NET: -275 mL    01-03-22 @ 07:01  -  01-03-22 @ 13:09  --------------------------------------------------------  IN: 0 mL / OUT: 450 mL / NET: -450 mL                            8.6    6.32  )-----------( 243      ( 03 Jan 2022 07:48 )             25.9               01-03    138  |  108  |  49<H>  ----------------------------<  221<H>  4.3   |  19<L>  |  1.30    Ca    8.0<L>      03 Jan 2022 07:48  Phos  3.9     01-03  Mg     1.7     01-03    TPro  5.1<L>  /  Alb  2.8<L>  /  TBili  0.5  /  DBili  0.1  /  AST  10  /  ALT  12  /  AlkPhos  41  01-03    PT/INR - ( 03 Jan 2022 07:48 )   PT: 15.7 sec;   INR: 1.33 ratio                Occult Blood, Feces (01.02.22 @ 18:18)   Occult Blood, Feces: Positive                 < from: [facility] Duplex Lower Ext Vein Scan, Bilat (01.03.22 @ 14:36) >    IMPRESSION:  No evidence of deep venous thrombosis in either lower extremity.          --- End of Report ---    < end of copied text >

## 2022-01-03 NOTE — PROGRESS NOTE ADULT - ASSESSMENT
gi bleed  acute blood loss anemia  covid    patient was started on full dose a/c  CTA negative for PE  a/c was dced  pt had drop in hgb with dark stool  suspected duodenal ulcer  CT showing thickening  will start PPI bid  cont clears  asa on hold  transfuse to hgb >8  cbc bid  will follow  d.w patient

## 2022-01-03 NOTE — PROGRESS NOTE ADULT - ASSESSMENT
76y Male with history of HTN, DM, depression presents with confusion. Nephrology consulted for hyponatremia.    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 Serum Na improving s/p tolvaptan 15 mg PO X 1 on 12/30 and s/p 2g NaCl PO x1 on 1/1. CONTINUE sodium chloride 1 gram TID. Continue with 1L FR and glucerna to increase osmolar intake when pt can take in PO.  PRBC may help.  If pt has prolonged course of NPO, will need IVF, but would be cautious as isotonic fluids may worsen hyponatremia and pt may need some degree of hypertonic fluids.   AM cortisol wnl. Repeat urine studies consistent with SIADH. Monitor serum Na.    2) HTN: BP controlled. Continue with current medications. Monitor BP.    3) LE edema: Patient appears euvolemic and TTE unremarkable. Unclear why patient on aldactone. Regardless, would hold for now. Monitor UO.    4) Depression: On cymbalta and seroquel. Both medications have been reported to cause hyponatremia however frequency is generally low. No renal objection to continue medications as needed for depression.    [facility] NEPHROLOGY  Tr Begum M.D.  Kelechi Chaudhry D.O.  Mariam Bell M.D.  Radha Burnett, MSN, ANP-C    Telephone: [phone]  Facsimile: [phone]    [address] [place] [address]

## 2022-01-03 NOTE — PROGRESS NOTE ADULT - ASSESSMENT
Patient is a 77 y/o M--with a PMHx of hypothyroidism on Synthroid, essential HTN on Lopressor, aldactone, depression on Seroquel and Cymbalta, CAD with S/P cardiac catheterization by Dr. Daryl Patrick at [place] in Nov 2019, type 2 DM on Januvia and bedtime Levemir, with the patient followed by his PCP above and apparently has had upper nasal congestion and loose BM for the past 3 weeks, with the son concerned with increased confusion and decreased ADL and exertional dyspnoea for the past 3 weeks, but worsening for the past few days, with the patient with an apparent outpatient lab assay with hyponatremia and was sent to the ER by his PCP, apparently with prior episodes of hyponatremia, under the presumption of the hyponatremia as the primary mechanism of the confusional state.  Patient had just received his booster dose of the COVID-10 Moderna 7 days ago.   No fever, no chills, no rigors.  NO sore throat but with upper nasal congestion as above.  NO neck pain.  No dyspnoea at present.  NO chest pain/pressure.  NO HA, no focal weakness.  No diaphoresis.      # ?GIB  --RRT called AM of 01/02 due to AMS and weakness following a dark BM   --Patient started on PPI BID per GI  --GI Consult placed  --Monitor HgB closely, monitor for signs and symptoms of bleeding; per GI transfuse for hgb >8.0   --Hgb down trending from 11.0--9.9--7.5; AC was put on hold  RRT   --CTA with Circumferential wall thickening with periduodenal fat stranding may represent enteritis.--F/U GI recommendations   --Diet was made CLD  --Please check CBC Q12  hours and monitor levels closely pr GI, ordered for 6pm 01/03, 6AM 01/04, 6PM 01/04   --F/U Occult -- positive   --per discussion with son, patient was to have EGD outpatient, but due to hypoNa, was cancelled   --S/p 2 units of PRBC on 01/02-- hemoglobin noted to be 8.4 today 01/03, continue to monitor with CBC Q12 per GI  --F/U GI recommendations       #? Cellulitis   --CTA with --Focal subcutaneous fat stranding along the upper right anterior abdominal wall; correlate for cellulitis.  --Upon exam, non erythematous, edematous  --Continue to monitor patient clinically closely  --Serial abdominal examinations     #COVID-19  --CT head ordered--NO acute bleed or mass.     --Presently NOT requiring supplemental O2 to warrant Decadron.     --Patient is currently saturating 98% on room air  --Discussed with ID attending Dr. Cintron, patient is not in need of Dexamethasone   --Per ID, can continue with Remdesivir for a total of 5 days (Started on 12/28) -- monitor renal function and LFTs   --Monitor inflammatory markers   --Continue with Symbicort 160  --Monitor patient clinically O2 saturation, vital signs and temperature curve       #Elevated D-Dimer  --D-Dimer noted to be 1326 12/31 from 341 12/28  --CT Angio ordered to R/O PE, negative for PE  --VA Duplex LE ordered to R/O DVT--negative for DVT    --Will start patient on full dose Lovenox 75mg BID until imaging results-- Has been stopped 01/02 S/P RRT due to concern for GIB ; HgB is downtrending 11.0--9.9--7.5   --Continue to trend D-Dimer       #Hyponatremia.   --CT head   --Continue with Fluid restriction; Monitor Na+ and daily weights.    --Renal evaluation, F/U Recommendations  --Started on Salt tab (Sodium chloride) per renal   --Continue to hold aldactone  --Cymbalta, and Seroquel can be resumed per nephrology; monitor QTC   --Monitor BMP daily -- Na noted to be 130 12/31; F/U nephrology recommendations   --Likely due to SIADH; S/P Tolvaptan 12/30, Salt tabs have been resumed.  -- repeat Urine Na and Osmolality, monitor Serum Na on AM labs -- per nephrology  -- Na noted to be 138 today 01/03       #CAD (coronary artery disease).   --Upgraded to telemetry to exclude cardiac equivalent.  --Continue with Aspirin   --Continue with Simvastatin   --Cardio consult PRN   --Check ECHO -- EF of 65%       #Type 2 diabetes mellitus.   --Continue with ISS for now  --Patient was on bedtime Lantus at home  --Continue to monitor FS and serum glucose levels  --Avoid Hypo/Hyperglycemia  --A1C -- 7.1  --Patient will need outpatient follow up for DM management and control   --Endocrinology consultation PRN   --Glucose levels noted to be elevated, Lantus increased to 10 units at bedtime, continue to  monitor Glucose levels and adjust accordingly  --patient was on 36 units at home, however patient may be scoped by GI, an is on clear diet for now, will continue to monitor and adjust accordingly       #Productive cough  --Patient reporting productive cough with white sputum  --Sputum culture-- Moderate polymorphonuclear leukocytes per low power field, Few Squamous epithelial cells per low power field, Numerous Gram Positive Cocci in Pairs and Chains seen per oil power field  --Throat culture --Negative   --Tessalon perle for cough  --Cough syrup PRN for cough   --Per patient, cough and sore throat have been improving       #Sore throat  --Patient endorsing sore throat  --Throat culture-- negative   --Tylenol for pain control   --Per patient, cough and sore throat have been improving       #HTN  --Monitor Vital Signs and BP closely  --Continue with home medication Lopressor for now  --Aldactone is on hold as may cause HypoNa  --Monitor BP   --Check Orthostatics  --Cardiology consult placed-- F/U recommendations       #Loose bowels  --Per patient experienced loose bowel outpatient   --If occurs and of watery consistency check for C.Diff   --Likely due to COVID  --Blood culture X 2 with No growth   --Urine culture with probable contamination and patient is currently asymptomatic  --Continue to monitor       #Depression  --Per nephrology can resume home medications-- Seroquel and Cymbalta that were previously on hold due to hyponatremia   --Continue to monitor patient and monitor QTC   --Psych consult PRN       #Hypothyroidism   --TSH on this admission-- WNL at 2.78  --Continue with home dose Synthroid       #Bradycardia   --Patient with bradycardia on tele   --At time of visit, patient was asymptomatic   --Continue to monitor on Tele and close monitoring of patient     #Need for prophylactic measure.   -- DVT prophylaxis currently on hold   --Per GI, ASA is currently on hold as well    --Patient will need outpatient follow up for CT Head findings       --Discussed with Son via telephone at patient's bedside

## 2022-01-03 NOTE — PROGRESS NOTE ADULT - ASSESSMENT
EKG SR LVH    Echo < from: Transthoracic Echocardiogram (12.28.21 @ 07:52) >  Mitral Valve: Mitral annular calcification.  Aortic Valve/Aorta: Calcified aortic valve with normal  opening. Mild aortic regurgitation.  Normal aortic root.  Left Atrium: Normal left atrium.  Left Ventricle: Normal left ventricular internal dimensions  and wall thicknesses.  Normal left ventricular systolic function. No segmental  wall motion abnormalities.  Impaired LV-relaxation with normal filling pressure.  Right Heart: Normal right atrium. Normal right ventricular  size andsystolic function.  Normal tricuspid valve. Normal pulmonic valve.  Pericardium/Pleura: Normal pericardium with no pericardial  effusion.  Hemodynamic: Estimated right atrial pressure is normal.  No evidence of pulmonary hypertension.    < end of copied text >      Assessment and Plan     1)  COVID 19 t/t per primary team , lovenox and asa held 2/2 GI bleed     2)  CAD : denies CP , SOB, asa held 2/2 GI bleed echo normal LV     3)  GI bleed GI on board f/u recs

## 2022-01-03 NOTE — PROGRESS NOTE ADULT - SUBJECTIVE AND OBJECTIVE BOX
Santosh Granados MD  Interventional Cardiology / Endovascular Specialist  [place] Office : [phone] [place] [address] [place] [address]  Tel:   [place] Office : [phone] [facility] [place] [address]  Tel: [phone]  Cell : [phone]    Pt lying in bed in [facility]   	  MEDICATIONS:  diltiazem    milliGRAM(s) Oral daily  metoprolol tartrate 100 milliGRAM(s) Oral two times a day  tamsulosin 0.4 milliGRAM(s) Oral at bedtime      benzonatate 100 milliGRAM(s) Oral every 8 hours PRN  budesonide 160 MICROgram(s)/formoterol 4.5 MICROgram(s) Inhaler 2 Puff(s) Inhalation two times a day  hydrocodone/homatropine Syrup 5 milliLiter(s) Oral every 6 hours PRN    acetaminophen     Tablet .. 650 milliGRAM(s) Oral every 4 hours PRN  DULoxetine 20 milliGRAM(s) Oral daily  QUEtiapine 25 milliGRAM(s) Oral at bedtime    pantoprazole  Injectable 40 milliGRAM(s) IV Push two times a day    dextrose 40% Gel 15 Gram(s) Oral once  dextrose 50% Injectable 25 Gram(s) IV Push once  dextrose 50% Injectable 12.5 Gram(s) IV Push once  dextrose 50% Injectable 25 Gram(s) IV Push once  glucagon  Injectable 1 milliGRAM(s) IntraMuscular once  insulin glargine Injectable (LANTUS) 10 Unit(s) SubCutaneous at bedtime  insulin lispro (ADMELOG) corrective regimen sliding scale   SubCutaneous three times a day before meals  insulin lispro (ADMELOG) corrective regimen sliding scale   SubCutaneous at bedtime  levothyroxine 25 MICROGram(s) Oral daily  simvastatin 20 milliGRAM(s) Oral at bedtime    dextrose 5%. 1000 milliLiter(s) IV Continuous <Continuous>  dextrose 5%. 1000 milliLiter(s) IV Continuous <Continuous>  sodium chloride 1 Gram(s) Oral three times a day      PAST MEDICAL/SURGICAL HISTORY  PAST MEDICAL & SURGICAL HISTORY:  Hypertension    Hyperlipidemia    Diabetes  type 2    CAD (coronary artery disease)  cardiac stents x7    Stented coronary artery    Depression    Thyroid disease    Benign hypertrophy of prostate    Lumbar disc disorder    MI (myocardial infarction)  12/2014    Diverticulitis    OA (osteoarthritis)    Lumbar radiculopathy    2019 novel coronavirus disease (COVID-19)    S/P knee replacement, bilateral    S/P shoulder surgery  s/p R rotator cuff surgery    History of cardiac catheterization  2010 2 stents, 2012 3 stents, 12/2014 2 coronary artery stents    History of back surgery  lumbar    H/O heart artery stent    S/P laminectomy with spinal fusion  x 2        SOCIAL HISTORY: Substance Use (street drugs): ( x ) never used  (  ) other:    FAMILY HISTORY:  Family history of heart disease (Sibling)        REVIEW OF SYSTEMS:  CONSTITUTIONAL: No fever, weight loss, or fatigue  EYES: No eye pain, visual disturbances, or discharge  ENMT:  No difficulty hearing, tinnitus, vertigo; No sinus or throat pain  BREASTS: No pain, masses, or nipple discharge  GASTROINTESTINAL: No abdominal or epigastric pain. No nausea, vomiting, or hematemesis; No diarrhea or constipation. No melena or hematochezia.  GENITOURINARY: No dysuria, frequency, hematuria, or incontinence  NEUROLOGICAL: No headaches, memory loss, loss of strength, numbness, or tremors  ENDOCRINE: No heat or cold intolerance; No hair loss  MUSCULOSKELETAL: No joint pain or swelling; No muscle, back, or extremity pain  PSYCHIATRIC: No depression, anxiety, mood swings, or difficulty sleeping  HEME/LYMPH: No easy bruising, or bleeding gums  All others negative    PHYSICAL EXAM:  T(C): 36.6 (01-03-22 @ 10:53), Max: 36.8 (01-02-22 @ 20:57)  HR: 72 (01-03-22 @ 16:55) (63 - 87)  BP: 117/68 (01-03-22 @ 16:55) (110/60 - 127/72)  RR: 18 (01-03-22 @ 16:55) (17 - 18)  SpO2: 100% (01-03-22 @ 16:55) (97% - 100%)  Wt(kg): --  I&O's Summary    02 Jan 2022 07:01  -  03 Jan 2022 07:00  --------------------------------------------------------  IN: 350 mL / OUT: 625 mL / NET: -275 mL    03 Jan 2022 07:01  -  03 Jan 2022 18:50  --------------------------------------------------------  IN: 240 mL / OUT: 1000 mL / NET: -760 mL    GENERAL: NAD  EYES: EOMI, PERRLA, conjunctiva and sclera clear  ENMT: No tonsillar erythema, exudates, or enlargement  Cardiovascular: Normal S1 S2, No JVD, No murmurs, No edema  Respiratory: Lungs clear to auscultation	  Gastrointestinal:  Soft, Non-tender, + BS	  Extremities: No edema                                8.4    6.59  )-----------( 248      ( 03 Jan 2022 15:08 )             25.0     01-03    138  |  108  |  49<H>  ----------------------------<  221<H>  4.3   |  19<L>  |  1.30    Ca    8.0<L>      03 Jan 2022 07:48  Phos  3.9     01-03  Mg     1.7     01-03    TPro  5.1<L>  /  Alb  2.8<L>  /  TBili  0.5  /  DBili  0.1  /  AST  10  /  ALT  12  /  AlkPhos  41  01-03    proBNP:   Lipid Profile:   HgA1c:   TSH:     Consultant(s) Notes Reviewed:  [x ] YES  [ ] NO    Care Discussed with Consultants/Other Providers [ x] YES  [ ] NO    Imaging Personally Reviewed independently:  [x] YES  [ ] NO    All labs, radiologic studies, vitals, orders and medications list reviewed. Patient is seen and examined at bedside. Case discussed with medical team.

## 2022-01-04 LAB
ANION GAP SERPL CALC-SCNC: 12 MMOL/L — SIGNIFICANT CHANGE UP (ref 5–17)
BASOPHILS # BLD AUTO: 0.02 K/UL — SIGNIFICANT CHANGE UP (ref 0–0.2)
BASOPHILS NFR BLD AUTO: 0.3 % — SIGNIFICANT CHANGE UP (ref 0–2)
BUN SERPL-MCNC: 33 MG/DL — HIGH (ref 7–23)
CALCIUM SERPL-MCNC: 8 MG/DL — LOW (ref 8.4–10.5)
CHLORIDE SERPL-SCNC: 105 MMOL/L — SIGNIFICANT CHANGE UP (ref 96–108)
CO2 SERPL-SCNC: 20 MMOL/L — LOW (ref 22–31)
CREAT SERPL-MCNC: 1.21 MG/DL — SIGNIFICANT CHANGE UP (ref 0.5–1.3)
D DIMER BLD IA.RAPID-MCNC: 159 NG/ML DDU — SIGNIFICANT CHANGE UP
D DIMER BLD IA.RAPID-MCNC: <150 NG/ML DDU — SIGNIFICANT CHANGE UP
EOSINOPHIL # BLD AUTO: 0.08 K/UL — SIGNIFICANT CHANGE UP (ref 0–0.5)
EOSINOPHIL NFR BLD AUTO: 1.3 % — SIGNIFICANT CHANGE UP (ref 0–6)
GLUCOSE SERPL-MCNC: 181 MG/DL — HIGH (ref 70–99)
HCT VFR BLD CALC: 25.7 % — LOW (ref 39–50)
HGB BLD-MCNC: 8.9 G/DL — LOW (ref 13–17)
IMM GRANULOCYTES NFR BLD AUTO: 1.5 % — SIGNIFICANT CHANGE UP (ref 0–1.5)
INR BLD: 1.23 RATIO — HIGH (ref 0.88–1.16)
LYMPHOCYTES # BLD AUTO: 1.52 K/UL — SIGNIFICANT CHANGE UP (ref 1–3.3)
LYMPHOCYTES # BLD AUTO: 25.6 % — SIGNIFICANT CHANGE UP (ref 13–44)
MAGNESIUM SERPL-MCNC: 1.6 MG/DL — SIGNIFICANT CHANGE UP (ref 1.6–2.6)
MCHC RBC-ENTMCNC: 31.8 PG — SIGNIFICANT CHANGE UP (ref 27–34)
MCHC RBC-ENTMCNC: 34.6 GM/DL — SIGNIFICANT CHANGE UP (ref 32–36)
MCV RBC AUTO: 91.8 FL — SIGNIFICANT CHANGE UP (ref 80–100)
MONOCYTES # BLD AUTO: 0.85 K/UL — SIGNIFICANT CHANGE UP (ref 0–0.9)
MONOCYTES NFR BLD AUTO: 14.3 % — HIGH (ref 2–14)
NEUTROPHILS # BLD AUTO: 3.38 K/UL — SIGNIFICANT CHANGE UP (ref 1.8–7.4)
NEUTROPHILS NFR BLD AUTO: 57 % — SIGNIFICANT CHANGE UP (ref 43–77)
NRBC # BLD: 0 /100 WBCS — SIGNIFICANT CHANGE UP (ref 0–0)
PHOSPHATE SERPL-MCNC: 4.5 MG/DL — SIGNIFICANT CHANGE UP (ref 2.5–4.5)
PLATELET # BLD AUTO: 218 K/UL — SIGNIFICANT CHANGE UP (ref 150–400)
POTASSIUM SERPL-MCNC: 3.9 MMOL/L — SIGNIFICANT CHANGE UP (ref 3.5–5.3)
POTASSIUM SERPL-SCNC: 3.9 MMOL/L — SIGNIFICANT CHANGE UP (ref 3.5–5.3)
PROTHROM AB SERPL-ACNC: 14.6 SEC — HIGH (ref 10.6–13.6)
RBC # BLD: 2.8 M/UL — LOW (ref 4.2–5.8)
RBC # FLD: 16 % — HIGH (ref 10.3–14.5)
SODIUM SERPL-SCNC: 137 MMOL/L — SIGNIFICANT CHANGE UP (ref 135–145)
WBC # BLD: 5.94 K/UL — SIGNIFICANT CHANGE UP (ref 3.8–10.5)
WBC # FLD AUTO: 5.94 K/UL — SIGNIFICANT CHANGE UP (ref 3.8–10.5)

## 2022-01-04 RX ADMIN — TAMSULOSIN HYDROCHLORIDE 0.4 MILLIGRAM(S): 0.4 CAPSULE ORAL at 21:37

## 2022-01-04 RX ADMIN — Medication 1: at 21:38

## 2022-01-04 RX ADMIN — Medication 2: at 17:24

## 2022-01-04 RX ADMIN — BUDESONIDE AND FORMOTEROL FUMARATE DIHYDRATE 2 PUFF(S): 160; 4.5 AEROSOL RESPIRATORY (INHALATION) at 08:11

## 2022-01-04 RX ADMIN — SODIUM CHLORIDE 1 GRAM(S): 9 INJECTION INTRAMUSCULAR; INTRAVENOUS; SUBCUTANEOUS at 14:19

## 2022-01-04 RX ADMIN — PANTOPRAZOLE SODIUM 40 MILLIGRAM(S): 20 TABLET, DELAYED RELEASE ORAL at 17:23

## 2022-01-04 RX ADMIN — SIMVASTATIN 20 MILLIGRAM(S): 20 TABLET, FILM COATED ORAL at 21:38

## 2022-01-04 RX ADMIN — PANTOPRAZOLE SODIUM 40 MILLIGRAM(S): 20 TABLET, DELAYED RELEASE ORAL at 05:19

## 2022-01-04 RX ADMIN — QUETIAPINE FUMARATE 25 MILLIGRAM(S): 200 TABLET, FILM COATED ORAL at 21:38

## 2022-01-04 RX ADMIN — SODIUM CHLORIDE 1 GRAM(S): 9 INJECTION INTRAMUSCULAR; INTRAVENOUS; SUBCUTANEOUS at 21:37

## 2022-01-04 RX ADMIN — Medication 300 MILLIGRAM(S): at 05:19

## 2022-01-04 RX ADMIN — Medication 100 MILLIGRAM(S): at 05:19

## 2022-01-04 RX ADMIN — Medication 2: at 12:17

## 2022-01-04 RX ADMIN — BUDESONIDE AND FORMOTEROL FUMARATE DIHYDRATE 2 PUFF(S): 160; 4.5 AEROSOL RESPIRATORY (INHALATION) at 21:39

## 2022-01-04 RX ADMIN — INSULIN GLARGINE 10 UNIT(S): 100 INJECTION, SOLUTION SUBCUTANEOUS at 21:38

## 2022-01-04 RX ADMIN — DULOXETINE HYDROCHLORIDE 20 MILLIGRAM(S): 30 CAPSULE, DELAYED RELEASE ORAL at 12:18

## 2022-01-04 RX ADMIN — Medication 25 MICROGRAM(S): at 05:20

## 2022-01-04 RX ADMIN — Medication 100 MILLIGRAM(S): at 17:26

## 2022-01-04 RX ADMIN — Medication 1: at 08:10

## 2022-01-04 RX ADMIN — SODIUM CHLORIDE 1 GRAM(S): 9 INJECTION INTRAMUSCULAR; INTRAVENOUS; SUBCUTANEOUS at 05:19

## 2022-01-04 NOTE — PROGRESS NOTE ADULT - ASSESSMENT
Patient is a 77 y/o M--with a PMHx of hypothyroidism on Synthroid, essential HTN on Lopressor, aldactone, depression on Seroquel and Cymbalta, CAD with S/P cardiac catheterization by Dr. Daryl Patrick at [place] in Nov 2019, type 2 DM on Januvia and bedtime Levemir, with the patient followed by his PCP above and apparently has had upper nasal congestion and loose BM for the past 3 weeks, with the son concerned with increased confusion and decreased ADL and exertional dyspnoea for the past 3 weeks, but worsening for the past few days, with the patient with an apparent outpatient lab assay with hyponatremia and was sent to the ER by his PCP, apparently with prior episodes of hyponatremia, under the presumption of the hyponatremia as the primary mechanism of the confusional state.  Patient had just received his booster dose of the COVID-10 Moderna 7 days ago.   No fever, no chills, no rigors.  NO sore throat but with upper nasal congestion as above.  NO neck pain.  No dyspnoea at present.  NO chest pain/pressure.  NO HA, no focal weakness.  No diaphoresis.      # ?GIB  --RRT called AM of 01/02 due to AMS and weakness following a dark BM   --Patient started on PPI BID per GI  --GI Consult placed  --Monitor HgB closely, monitor for signs and symptoms of bleeding; per GI transfuse for hgb >8.0   --Hgb down trending from 11.0--9.9--7.5; AC was put on hold  RRT   --CTA with Circumferential wall thickening with periduodenal fat stranding may represent enteritis.--F/U GI recommendations   --F/U Occult -- positive   --per discussion with son, patient was to have EGD outpatient, but due to hypoNa, was cancelled   --S/p 2 units of PRBC on 01/02-- hemoglobin noted to be 8.4 today 01/03, continue to monitor with CBC Q12 per GI  --F/U GI recommendations, plan for scope on thursday       #? Cellulitis   --CTA with --Focal subcutaneous fat stranding along the upper right anterior abdominal wall; correlate for cellulitis.  --Upon exam, non erythematous, edematous  --Continue to monitor patient clinically closely  --Serial abdominal examinations     #COVID-19  --CT head ordered--NO acute bleed or mass.     --Presently NOT requiring supplemental O2 to warrant Decadron.     --Patient is currently saturating 98% on room air  --Discussed with ID attending Dr. Cintron, patient is not in need of Dexamethasone   --Per ID, can continue with Remdesivir for a total of 5 days (Started on 12/28) -- monitor renal function and LFTs   --Monitor inflammatory markers   --Continue with Symbicort 160  --Monitor patient clinically O2 saturation, vital signs and temperature curve       #Elevated D-Dimer  --D-Dimer noted to be 1326 12/31 from 341 12/28  --CT Angio ordered to R/O PE, negative for PE  --VA Duplex LE ordered to R/O DVT--negative for DVT    --Will start patient on full dose Lovenox 75mg BID until imaging results-- Has been stopped 01/02 S/P RRT due to concern for GIB ; HgB is downtrending 11.0--9.9--7.5   --Continue to trend D-Dimer       #Hyponatremia.   --CT head   --Continue with Fluid restriction; Monitor Na+ and daily weights.    --Renal evaluation, F/U Recommendations  --Started on Salt tab (Sodium chloride) per renal   --Continue to hold aldactone  --Cymbalta, and Seroquel can be resumed per nephrology; monitor QTC   --Monitor BMP daily -- Na noted to be 130 12/31; F/U nephrology recommendations   --Likely due to SIADH; S/P Tolvaptan 12/30, Salt tabs have been resumed.  -- repeat Urine Na and Osmolality, monitor Serum Na on AM labs -- per nephrology  -- Na noted to be 138 today 01/03       #CAD (coronary artery disease).   --Upgraded to telemetry to exclude cardiac equivalent.  --Continue with Aspirin   --Continue with Simvastatin   --Cardio consult PRN   --Check ECHO -- EF of 65%       #Type 2 diabetes mellitus.   --Continue with ISS for now  --Patient was on bedtime Lantus at home  --Continue to monitor FS and serum glucose levels  --Avoid Hypo/Hyperglycemia  --A1C -- 7.1  --Patient will need outpatient follow up for DM management and control   --Endocrinology consultation PRN   --Glucose levels noted to be elevated, Lantus increased to 10 units at bedtime, continue to  monitor Glucose levels and adjust accordingly  --patient was on 36 units at home, however patient may be scoped by GI, an is on clear diet for now, will continue to monitor and adjust accordingly       #Productive cough  --Patient reporting productive cough with white sputum  --Sputum culture-- Moderate polymorphonuclear leukocytes per low power field, Few Squamous epithelial cells per low power field, Numerous Gram Positive Cocci in Pairs and Chains seen per oil power field  --Throat culture --Negative   --Tessalon perle for cough  --Cough syrup PRN for cough   --Per patient, cough and sore throat have been improving       #Sore throat  --Patient endorsing sore throat  --Throat culture-- negative   --Tylenol for pain control   --Per patient, cough and sore throat have been improving       #HTN  --Monitor Vital Signs and BP closely  --Continue with home medication Lopressor for now  --Aldactone is on hold as may cause HypoNa  --Monitor BP   --Check Orthostatics  --Cardiology consult placed-- F/U recommendations       #Loose bowels  --Per patient experienced loose bowel outpatient   --If occurs and of watery consistency check for C.Diff   --Likely due to COVID  --Blood culture X 2 with No growth   --Urine culture with probable contamination and patient is currently asymptomatic  --Continue to monitor       #Depression  --Per nephrology can resume home medications-- Seroquel and Cymbalta that were previously on hold due to hyponatremia   --Continue to monitor patient and monitor QTC   --Psych consult PRN       #Hypothyroidism   --TSH on this admission-- WNL at 2.78  --Continue with home dose Synthroid       #Bradycardia   --Patient with bradycardia on tele   --At time of visit, patient was asymptomatic   --Continue to monitor on Tele and close monitoring of patient     #Need for prophylactic measure.   -- DVT prophylaxis currently on hold   --Per GI, ASA is currently on hold as well    --Patient will need outpatient follow up for CT Head findings       --Discussed with Son via telephone at patient's bedside

## 2022-01-04 NOTE — PROGRESS NOTE ADULT - SUBJECTIVE AND OBJECTIVE BOX
Name of Patient : JERRI SIDHU  MRN: [id number]  Date of visit: 01-04-22       Subjective: Patient seen and examined. No new events except as noted.   Doing okay  drop in hgb evel    REVIEW OF SYSTEMS:    CONSTITUTIONAL: No weakness, fevers or chills  EYES/ENT: No visual changes;  No vertigo or throat pain   NECK: No pain or stiffness  RESPIRATORY: No cough, wheezing, hemoptysis; No shortness of breath  CARDIOVASCULAR: No chest pain or palpitations  GASTROINTESTINAL: No abdominal or epigastric pain. No nausea, vomiting, or hematemesis; No diarrhea or constipation. No melena or hematochezia.  GENITOURINARY: No dysuria, frequency or hematuria  NEUROLOGICAL: No numbness or weakness  SKIN: No itching, burning, rashes, or lesions   All other review of systems is negative unless indicated above.    MEDICATIONS:  MEDICATIONS  (STANDING):  budesonide 160 MICROgram(s)/formoterol 4.5 MICROgram(s) Inhaler 2 Puff(s) Inhalation two times a day  dextrose 40% Gel 15 Gram(s) Oral once  dextrose 5%. 1000 milliLiter(s) (50 mL/Hr) IV Continuous <Continuous>  dextrose 5%. 1000 milliLiter(s) (100 mL/Hr) IV Continuous <Continuous>  dextrose 50% Injectable 25 Gram(s) IV Push once  dextrose 50% Injectable 12.5 Gram(s) IV Push once  dextrose 50% Injectable 25 Gram(s) IV Push once  diltiazem    milliGRAM(s) Oral daily  DULoxetine 20 milliGRAM(s) Oral daily  glucagon  Injectable 1 milliGRAM(s) IntraMuscular once  insulin glargine Injectable (LANTUS) 10 Unit(s) SubCutaneous at bedtime  insulin lispro (ADMELOG) corrective regimen sliding scale   SubCutaneous three times a day before meals  insulin lispro (ADMELOG) corrective regimen sliding scale   SubCutaneous at bedtime  levothyroxine 25 MICROGram(s) Oral daily  metoprolol tartrate 100 milliGRAM(s) Oral two times a day  pantoprazole  Injectable 40 milliGRAM(s) IV Push two times a day  QUEtiapine 25 milliGRAM(s) Oral at bedtime  simvastatin 20 milliGRAM(s) Oral at bedtime  sodium chloride 1 Gram(s) Oral three times a day  tamsulosin 0.4 milliGRAM(s) Oral at bedtime      PHYSICAL EXAM:  T(C): 36.6 (01-04-22 @ 20:15), Max: 36.7 (01-04-22 @ 00:53)  HR: 61 (01-04-22 @ 20:15) (58 - 65)  BP: 132/65 (01-04-22 @ 20:15) (106/63 - 132/65)  RR: 18 (01-04-22 @ 20:15) (18 - 18)  SpO2: 100% (01-04-22 @ 20:15) (98% - 100%)  Wt(kg): --  I&O's Summary    03 Jan 2022 07:01  -  04 Jan 2022 07:00  --------------------------------------------------------  IN: 780 mL / OUT: 1000 mL / NET: -220 mL    04 Jan 2022 07:01  -  04 Jan 2022 22:54  --------------------------------------------------------  IN: 840 mL / OUT: 1300 mL / NET: -460 mL          Appearance: Normal	  HEENT:  PERRLA   Lymphatic: No lymphadenopathy   Cardiovascular: Normal S1 S2, no JVD  Respiratory: normal effort , clear  Gastrointestinal:  Soft, Non-tender  Skin: No rashes,  warm to touch  Psychiatry:  Mood & affect appropriate  Musculuskeletal: No edema      All labs, Imaging and EKGs personally reviewed     01-03-22 @ 07:01  -  01-04-22 @ 07:00  --------------------------------------------------------  IN: 780 mL / OUT: 1000 mL / NET: -220 mL    01-04-22 @ 07:01  -  01-04-22 @ 22:54  --------------------------------------------------------  IN: 840 mL / OUT: 1300 mL / NET: -460 mL                          8.9    5.94  )-----------( 218      ( 04 Jan 2022 08:00 )             25.7               01-04    137  |  105  |  33<H>  ----------------------------<  181<H>  3.9   |  20<L>  |  1.21    Ca    8.0<L>      04 Jan 2022 08:00  Phos  4.5     01-04  Mg     1.6     01-04    TPro  5.2<L>  /  Alb  2.8<L>  /  TBili  0.5  /  DBili  0.2  /  AST  14  /  ALT  14  /  AlkPhos  43  01-04    PT/INR - ( 04 Jan 2022 08:00 )   PT: 14.6 sec;   INR: 1.23 ratio

## 2022-01-04 NOTE — PROGRESS NOTE ADULT - SUBJECTIVE AND OBJECTIVE BOX
Santosh Granados MD  Interventional Cardiology / Advance Heart Failure and Cardiac Transplant Specialist  [place] Office : [address] [place] [address] [place]. [address]  Tel:   [place] Office : [address] [facility] [place] [address]  Tel: [phone]  Cell : [phone]    Subjective/Overnight events: Pt is lying in bed comfortable not in distress, somnolent  	  MEDICATIONS:  diltiazem    milliGRAM(s) Oral daily  metoprolol tartrate 100 milliGRAM(s) Oral two times a day  tamsulosin 0.4 milliGRAM(s) Oral at bedtime      benzonatate 100 milliGRAM(s) Oral every 8 hours PRN  budesonide 160 MICROgram(s)/formoterol 4.5 MICROgram(s) Inhaler 2 Puff(s) Inhalation two times a day  hydrocodone/homatropine Syrup 5 milliLiter(s) Oral every 6 hours PRN    acetaminophen     Tablet .. 650 milliGRAM(s) Oral every 4 hours PRN  DULoxetine 20 milliGRAM(s) Oral daily  QUEtiapine 25 milliGRAM(s) Oral at bedtime    pantoprazole  Injectable 40 milliGRAM(s) IV Push two times a day    dextrose 40% Gel 15 Gram(s) Oral once  dextrose 50% Injectable 25 Gram(s) IV Push once  dextrose 50% Injectable 12.5 Gram(s) IV Push once  dextrose 50% Injectable 25 Gram(s) IV Push once  glucagon  Injectable 1 milliGRAM(s) IntraMuscular once  insulin glargine Injectable (LANTUS) 10 Unit(s) SubCutaneous at bedtime  insulin lispro (ADMELOG) corrective regimen sliding scale   SubCutaneous three times a day before meals  insulin lispro (ADMELOG) corrective regimen sliding scale   SubCutaneous at bedtime  levothyroxine 25 MICROGram(s) Oral daily  simvastatin 20 milliGRAM(s) Oral at bedtime    dextrose 5%. 1000 milliLiter(s) IV Continuous <Continuous>  dextrose 5%. 1000 milliLiter(s) IV Continuous <Continuous>  sodium chloride 1 Gram(s) Oral three times a day      PAST MEDICAL/SURGICAL HISTORY  PAST MEDICAL & SURGICAL HISTORY:  Hypertension    Hyperlipidemia    Diabetes  type 2    CAD (coronary artery disease)  cardiac stents x7    Stented coronary artery    Depression    Thyroid disease    Benign hypertrophy of prostate    Lumbar disc disorder    MI (myocardial infarction)  12/2014    Diverticulitis    OA (osteoarthritis)    Lumbar radiculopathy    2019 novel coronavirus disease (COVID-19)    S/P knee replacement, bilateral    S/P shoulder surgery  s/p R rotator cuff surgery    History of cardiac catheterization  2010 2 stents, 2012 3 stents, 12/2014 2 coronary artery stents    History of back surgery  lumbar    H/O heart artery stent    S/P laminectomy with spinal fusion  x 2        SOCIAL HISTORY: Substance Use (street drugs): ( x ) never used  (  ) other:    FAMILY HISTORY:  Family history of heart disease (Sibling)      PHYSICAL EXAM:  T(C): 36.4 (01-04-22 @ 04:27), Max: 36.7 (01-04-22 @ 00:53)  HR: 64 (01-04-22 @ 04:27) (61 - 72)  BP: 129/72 (01-04-22 @ 04:27) (106/63 - 129/72)  RR: 18 (01-04-22 @ 04:27) (18 - 18)  SpO2: 100% (01-04-22 @ 04:27) (98% - 100%)  Wt(kg): --  I&O's Summary    03 Jan 2022 07:01  -  04 Jan 2022 07:00  --------------------------------------------------------  IN: 780 mL / OUT: 1000 mL / NET: -220 mL            GENERAL: NAD  EYES: EOMI, PERRLA, conjunctiva and sclera clear  ENMT: No tonsillar erythema, exudates, or enlargement  Cardiovascular: Normal S1 S2, No JVD, No murmurs, No edema  Respiratory: Lungs clear to auscultation	  Gastrointestinal:  Soft, Non-tender, + BS	  Extremities: No edema                                      8.9    5.94  )-----------( 218      ( 04 Jan 2022 08:00 )             25.7     01-04    137  |  105  |  33<H>  ----------------------------<  181<H>  3.9   |  20<L>  |  1.21    Ca    8.0<L>      04 Jan 2022 08:00  Phos  4.5     01-04  Mg     1.6     01-04    TPro  5.2<L>  /  Alb  2.8<L>  /  TBili  0.5  /  DBili  0.2  /  AST  14  /  ALT  14  /  AlkPhos  43  01-04    proBNP:   Lipid Profile:   HgA1c:   TSH:     Consultant(s) Notes Reviewed:  [x ] YES  [ ] NO    Care Discussed with Consultants/Other Providers [ x] YES  [ ] NO    Imaging Personally Reviewed independently:  [x] YES  [ ] NO    All labs, radiologic studies, vitals, orders and medications list reviewed. Patient is seen and examined at bedside. Case discussed with medical team.

## 2022-01-04 NOTE — PROGRESS NOTE ADULT - ASSESSMENT
gi bleed  acute blood loss anemia  covid    patient was started on full dose a/c  CTA negative for PE  a/c was dced  pt had drop in hgb with dark stool  suspected duodenal ulcer  CT showing thickening  will start PPI bid  cont clears  asa on hold  transfuse to hgb >8  cbc bid  EGD and colonoscopy scheduled for Thursday 11/6  will follow  jorge rivera

## 2022-01-04 NOTE — PROGRESS NOTE ADULT - SUBJECTIVE AND OBJECTIVE BOX
[facility] NEPHROLOGY- PROGRESS NOTE    76y Male with history of HTN, DM, depression presents with confusion. Nephrology consulted for hyponatremia.    REVIEW OF SYSTEMS:  Gen: no changes in weight  Cards: no chest pain  Resp: no dyspnea  GI: no nausea or vomiting or diarrhea  Vascular: no LE edema    No Known Allergies      Hospital Medications: Medications reviewed    VITALS:  T(F): 97.9 (01-04-22 @ 10:32), Max: 98 (01-04-22 @ 00:53)  HR: 58 (01-04-22 @ 10:32)  BP: 123/60 (01-04-22 @ 10:32)  RR: 18 (01-04-22 @ 10:32)  SpO2: 99% (01-04-22 @ 10:32)  Wt(kg): --    01-03 @ 07:01  -  01-04 @ 07:00  --------------------------------------------------------  IN: 780 mL / OUT: 1000 mL / NET: -220 mL    01-04 @ 07:01  -  01-04 @ 13:20  --------------------------------------------------------  IN: 120 mL / OUT: 300 mL / NET: -180 mL        PHYSICAL EXAM:    Gen: NAD, calm  Cards: RRR, +S1/S2, no M/G/R  Resp: course BS B/L  GI: soft, NT/ND, NABS  Vascular: no LE edema B/L      LABS:  01-04    137  |  105  |  33<H>  ----------------------------<  181<H>  3.9   |  20<L>  |  1.21    Ca    8.0<L>      04 Jan 2022 08:00  Phos  4.5     01-04  Mg     1.6     01-04    TPro  5.2<L>  /  Alb  2.8<L>  /  TBili  0.5  /  DBili  0.2  /  AST  14  /  ALT  14  /  AlkPhos  43  01-04    Creatinine Trend: 1.21 <--, 1.30 <--, 1.22 <--, 1.24 <--, 1.24 <--, 1.08 <--, 1.05 <--, 1.02 <--, 1.17 <--, 1.14 <--, 1.25 <--                        8.9    5.94  )-----------( 218      ( 04 Jan 2022 08:00 )             25.7     Urine Studies:    Osmolality, Random Urine: 572 mos/kg (12-30 @ 15:00)  Sodium, Random Urine: 49 mmol/L (12-30 @ 07:10)

## 2022-01-04 NOTE — PROGRESS NOTE ADULT - SUBJECTIVE AND OBJECTIVE BOX
[facility]  Rodrigo Arriola PA-C  [facility] [place] [address]  [phone]      INTERVAL HPI/OVERNIGHT EVENTS:  pt seen and examined, received another unit PRBC last night   H/H stable    MEDICATIONS  (STANDING):  budesonide 160 MICROgram(s)/formoterol 4.5 MICROgram(s) Inhaler 2 Puff(s) Inhalation two times a day  dextrose 40% Gel 15 Gram(s) Oral once  dextrose 5%. 1000 milliLiter(s) (50 mL/Hr) IV Continuous <Continuous>  dextrose 5%. 1000 milliLiter(s) (100 mL/Hr) IV Continuous <Continuous>  dextrose 50% Injectable 25 Gram(s) IV Push once  dextrose 50% Injectable 12.5 Gram(s) IV Push once  dextrose 50% Injectable 25 Gram(s) IV Push once  diltiazem    milliGRAM(s) Oral daily  DULoxetine 20 milliGRAM(s) Oral daily  glucagon  Injectable 1 milliGRAM(s) IntraMuscular once  insulin glargine Injectable (LANTUS) 5 Unit(s) SubCutaneous at bedtime  insulin lispro (ADMELOG) corrective regimen sliding scale   SubCutaneous three times a day before meals  insulin lispro (ADMELOG) corrective regimen sliding scale   SubCutaneous at bedtime  levothyroxine 25 MICROGram(s) Oral daily  metoprolol tartrate 100 milliGRAM(s) Oral two times a day  pantoprazole  Injectable 40 milliGRAM(s) IV Push two times a day  QUEtiapine 25 milliGRAM(s) Oral at bedtime  simvastatin 20 milliGRAM(s) Oral at bedtime  sodium chloride 1 Gram(s) Oral three times a day  tamsulosin 0.4 milliGRAM(s) Oral at bedtime    MEDICATIONS  (PRN):  acetaminophen     Tablet .. 650 milliGRAM(s) Oral every 4 hours PRN Temp greater or equal to 38.5C (101.3F)  benzonatate 100 milliGRAM(s) Oral every 8 hours PRN Cough  hydrocodone/homatropine Syrup 5 milliLiter(s) Oral every 6 hours PRN Cough      Allergies    No Known Allergies    Intolerances        ROS:   General:  No wt loss, fevers, chills, night sweats, fatigue,   Eyes:  Good vision, no reported pain  ENT:  No sore throat, pain, runny nose, dysphagia  CV:  No pain, palpitations, hypo/hypertension  Resp:  No dyspnea, cough, tachypnea, wheezing  GI:  No pain, No nausea, No vomiting, No diarrhea, No constipation, No weight loss, No fever, No pruritis, No rectal bleeding, + tarry stools, No dysphagia,  :  No pain, bleeding, incontinence, nocturia  Muscle:  No pain, weakness  Neuro:  No weakness, tingling, memory problems  Psych:  No fatigue, insomnia, mood problems, depression  Endocrine:  No polyuria, polydipsia, cold/heat intolerance  Heme:  No petechiae, ecchymosis, easy bruisability  Skin:  No rash, tattoos, scars, edema      PHYSICAL EXAM:   Vital Signs Last 24 Hrs  T(C): 36.6 (04 Jan 2022 10:32), Max: 36.7 (04 Jan 2022 00:53)  T(F): 97.9 (04 Jan 2022 10:32), Max: 98 (04 Jan 2022 00:53)  HR: 58 (04 Jan 2022 10:32) (58 - 72)  BP: 123/60 (04 Jan 2022 10:32) (106/63 - 129/72)  BP(mean): --  RR: 18 (04 Jan 2022 10:32) (18 - 18)  SpO2: 99% (04 Jan 2022 10:32) (98% - 100%)  Daily     Daily     GENERAL:  Appears stated age,   HEENT:  NC/AT,    CHEST:  Full & symmetric excursion,   HEART:  Regular rhythm,  ABDOMEN:  Soft, non-tender, non-distended,  EXTEREMITIES:  no cyanosis  SKIN:  No rash  NEURO:  Alert,       LABS:                        8.9    5.94  )-----------( 218      ( 04 Jan 2022 08:00 )             25.7   01-04    137  |  105  |  33<H>  ----------------------------<  181<H>  3.9   |  20<L>  |  1.21    Ca    8.0<L>      04 Jan 2022 08:00  Phos  4.5     01-04  Mg     1.6     01-04    TPro  5.2<L>  /  Alb  2.8<L>  /  TBili  0.5  /  DBili  0.2  /  AST  14  /  ALT  14  /  AlkPhos  43  01-04  PT/INR - ( 04 Jan 2022 08:00 )   PT: 14.6 sec;   INR: 1.23 ratio                    RADIOLOGY & ADDITIONAL TESTS:

## 2022-01-04 NOTE — PROGRESS NOTE ADULT - ASSESSMENT
76y Male with history of HTN, DM, depression presents with confusion. Nephrology consulted for hyponatremia.    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 Serum Na improved s/p tolvaptan on 12/30. Continue with sodium chloride 1 gram TID,  FR and glucerna to increase osmolar intake. Monitor serum Na.    2) HTN: BP controlled. Continue with current medications. Monitor BP.    3) LE edema: Patient appears euvolemic and TTE unremarkable. Unclear why patient on aldactone. Would continue to hold for now. Monitor UO.    4) Depression: On cymbalta and seroquel. Both medications have been reported to cause hyponatremia however frequency is generally low. No renal objection to continue medications as needed for depression.    [facility] NEPHROLOGY  Tr Begum M.D.  Kelechi Chaudhry D.O.  Mariam Bell M.D.  Radha Burnett, MSN, ANP-C    Telephone: [phone]  Facsimile: [phone]    [address] [place] [address]

## 2022-01-05 LAB
ANION GAP SERPL CALC-SCNC: 11 MMOL/L — SIGNIFICANT CHANGE UP (ref 5–17)
BUN SERPL-MCNC: 20 MG/DL — SIGNIFICANT CHANGE UP (ref 7–23)
CALCIUM SERPL-MCNC: 8.1 MG/DL — LOW (ref 8.4–10.5)
CHLORIDE SERPL-SCNC: 104 MMOL/L — SIGNIFICANT CHANGE UP (ref 96–108)
CO2 SERPL-SCNC: 21 MMOL/L — LOW (ref 22–31)
CREAT SERPL-MCNC: 1.21 MG/DL — SIGNIFICANT CHANGE UP (ref 0.5–1.3)
GLUCOSE SERPL-MCNC: 159 MG/DL — HIGH (ref 70–99)
HCT VFR BLD CALC: 26.2 % — LOW (ref 39–50)
HCT VFR BLD CALC: 31.5 % — LOW (ref 39–50)
HGB BLD-MCNC: 10.6 G/DL — LOW (ref 13–17)
HGB BLD-MCNC: 8.9 G/DL — LOW (ref 13–17)
MAGNESIUM SERPL-MCNC: 1.7 MG/DL — SIGNIFICANT CHANGE UP (ref 1.6–2.6)
MCHC RBC-ENTMCNC: 31.7 PG — SIGNIFICANT CHANGE UP (ref 27–34)
MCHC RBC-ENTMCNC: 31.7 PG — SIGNIFICANT CHANGE UP (ref 27–34)
MCHC RBC-ENTMCNC: 33.7 GM/DL — SIGNIFICANT CHANGE UP (ref 32–36)
MCHC RBC-ENTMCNC: 34 GM/DL — SIGNIFICANT CHANGE UP (ref 32–36)
MCV RBC AUTO: 93.2 FL — SIGNIFICANT CHANGE UP (ref 80–100)
MCV RBC AUTO: 94.3 FL — SIGNIFICANT CHANGE UP (ref 80–100)
NRBC # BLD: 0 /100 WBCS — SIGNIFICANT CHANGE UP (ref 0–0)
NRBC # BLD: 0 /100 WBCS — SIGNIFICANT CHANGE UP (ref 0–0)
PLATELET # BLD AUTO: 239 K/UL — SIGNIFICANT CHANGE UP (ref 150–400)
PLATELET # BLD AUTO: 288 K/UL — SIGNIFICANT CHANGE UP (ref 150–400)
POTASSIUM SERPL-MCNC: 4.2 MMOL/L — SIGNIFICANT CHANGE UP (ref 3.5–5.3)
POTASSIUM SERPL-SCNC: 4.2 MMOL/L — SIGNIFICANT CHANGE UP (ref 3.5–5.3)
RBC # BLD: 2.81 M/UL — LOW (ref 4.2–5.8)
RBC # BLD: 3.34 M/UL — LOW (ref 4.2–5.8)
RBC # FLD: 15.6 % — HIGH (ref 10.3–14.5)
RBC # FLD: 16.1 % — HIGH (ref 10.3–14.5)
SODIUM SERPL-SCNC: 136 MMOL/L — SIGNIFICANT CHANGE UP (ref 135–145)
WBC # BLD: 5.18 K/UL — SIGNIFICANT CHANGE UP (ref 3.8–10.5)
WBC # BLD: 6.32 K/UL — SIGNIFICANT CHANGE UP (ref 3.8–10.5)
WBC # FLD AUTO: 5.18 K/UL — SIGNIFICANT CHANGE UP (ref 3.8–10.5)
WBC # FLD AUTO: 6.32 K/UL — SIGNIFICANT CHANGE UP (ref 3.8–10.5)

## 2022-01-05 RX ORDER — INSULIN LISPRO 100/ML
VIAL (ML) SUBCUTANEOUS EVERY 6 HOURS
Refills: 0 | Status: DISCONTINUED | OUTPATIENT
Start: 2022-01-05 | End: 2022-01-06

## 2022-01-05 RX ORDER — INSULIN LISPRO 100/ML
2 VIAL (ML) SUBCUTANEOUS
Refills: 0 | Status: DISCONTINUED | OUTPATIENT
Start: 2022-01-05 | End: 2022-01-06

## 2022-01-05 RX ORDER — INSULIN GLARGINE 100 [IU]/ML
5 INJECTION, SOLUTION SUBCUTANEOUS ONCE
Refills: 0 | Status: COMPLETED | OUTPATIENT
Start: 2022-01-05 | End: 2022-01-05

## 2022-01-05 RX ORDER — SOD SULF/SODIUM/NAHCO3/KCL/PEG
1000 SOLUTION, RECONSTITUTED, ORAL ORAL EVERY 4 HOURS
Refills: 0 | Status: COMPLETED | OUTPATIENT
Start: 2022-01-05 | End: 2022-01-05

## 2022-01-05 RX ADMIN — QUETIAPINE FUMARATE 25 MILLIGRAM(S): 200 TABLET, FILM COATED ORAL at 22:13

## 2022-01-05 RX ADMIN — Medication 100 MILLIGRAM(S): at 05:03

## 2022-01-05 RX ADMIN — Medication 10 MILLIGRAM(S): at 17:12

## 2022-01-05 RX ADMIN — Medication 10 MILLIGRAM(S): at 22:12

## 2022-01-05 RX ADMIN — TAMSULOSIN HYDROCHLORIDE 0.4 MILLIGRAM(S): 0.4 CAPSULE ORAL at 22:12

## 2022-01-05 RX ADMIN — SODIUM CHLORIDE 1 GRAM(S): 9 INJECTION INTRAMUSCULAR; INTRAVENOUS; SUBCUTANEOUS at 05:02

## 2022-01-05 RX ADMIN — SIMVASTATIN 20 MILLIGRAM(S): 20 TABLET, FILM COATED ORAL at 22:12

## 2022-01-05 RX ADMIN — Medication 1000 MILLILITER(S): at 17:11

## 2022-01-05 RX ADMIN — SODIUM CHLORIDE 1 GRAM(S): 9 INJECTION INTRAMUSCULAR; INTRAVENOUS; SUBCUTANEOUS at 22:12

## 2022-01-05 RX ADMIN — PANTOPRAZOLE SODIUM 40 MILLIGRAM(S): 20 TABLET, DELAYED RELEASE ORAL at 17:12

## 2022-01-05 RX ADMIN — BUDESONIDE AND FORMOTEROL FUMARATE DIHYDRATE 2 PUFF(S): 160; 4.5 AEROSOL RESPIRATORY (INHALATION) at 08:17

## 2022-01-05 RX ADMIN — Medication 100 MILLIGRAM(S): at 17:19

## 2022-01-05 RX ADMIN — DULOXETINE HYDROCHLORIDE 20 MILLIGRAM(S): 30 CAPSULE, DELAYED RELEASE ORAL at 11:57

## 2022-01-05 RX ADMIN — Medication 1000 MILLILITER(S): at 22:13

## 2022-01-05 RX ADMIN — Medication 300 MILLIGRAM(S): at 05:02

## 2022-01-05 RX ADMIN — BUDESONIDE AND FORMOTEROL FUMARATE DIHYDRATE 2 PUFF(S): 160; 4.5 AEROSOL RESPIRATORY (INHALATION) at 23:03

## 2022-01-05 RX ADMIN — Medication 2 UNIT(S): at 17:20

## 2022-01-05 RX ADMIN — PANTOPRAZOLE SODIUM 40 MILLIGRAM(S): 20 TABLET, DELAYED RELEASE ORAL at 05:03

## 2022-01-05 RX ADMIN — Medication 25 MICROGRAM(S): at 05:03

## 2022-01-05 RX ADMIN — Medication 2: at 17:19

## 2022-01-05 RX ADMIN — Medication 3: at 11:47

## 2022-01-05 RX ADMIN — INSULIN GLARGINE 5 UNIT(S): 100 INJECTION, SOLUTION SUBCUTANEOUS at 23:02

## 2022-01-05 RX ADMIN — Medication 2 UNIT(S): at 11:46

## 2022-01-05 RX ADMIN — SODIUM CHLORIDE 1 GRAM(S): 9 INJECTION INTRAMUSCULAR; INTRAVENOUS; SUBCUTANEOUS at 14:48

## 2022-01-05 NOTE — PROGRESS NOTE ADULT - SUBJECTIVE AND OBJECTIVE BOX
Santosh Granados MD  Interventional Cardiology / Advance Heart Failure and Cardiac Transplant Specialist  [place] Office : [address] [place] [address] [place]. [address]  Tel:   [place] Office : [address] [facility] [place] [address]  Tel: [phone]  Cell : [phone]    Subjective/Overnight events: Pt is lying in bed comfortable not in distress, no chest pains no SOB no palpitations  	  MEDICATIONS:  diltiazem    milliGRAM(s) Oral daily  metoprolol tartrate 100 milliGRAM(s) Oral two times a day  tamsulosin 0.4 milliGRAM(s) Oral at bedtime      benzonatate 100 milliGRAM(s) Oral every 8 hours PRN  budesonide 160 MICROgram(s)/formoterol 4.5 MICROgram(s) Inhaler 2 Puff(s) Inhalation two times a day  hydrocodone/homatropine Syrup 5 milliLiter(s) Oral every 6 hours PRN    acetaminophen     Tablet .. 650 milliGRAM(s) Oral every 4 hours PRN  DULoxetine 20 milliGRAM(s) Oral daily  QUEtiapine 25 milliGRAM(s) Oral at bedtime    pantoprazole  Injectable 40 milliGRAM(s) IV Push two times a day    dextrose 40% Gel 15 Gram(s) Oral once  dextrose 50% Injectable 25 Gram(s) IV Push once  dextrose 50% Injectable 12.5 Gram(s) IV Push once  dextrose 50% Injectable 25 Gram(s) IV Push once  glucagon  Injectable 1 milliGRAM(s) IntraMuscular once  insulin glargine Injectable (LANTUS) 10 Unit(s) SubCutaneous at bedtime  insulin lispro (ADMELOG) corrective regimen sliding scale   SubCutaneous three times a day before meals  insulin lispro (ADMELOG) corrective regimen sliding scale   SubCutaneous at bedtime  levothyroxine 25 MICROGram(s) Oral daily  simvastatin 20 milliGRAM(s) Oral at bedtime    dextrose 5%. 1000 milliLiter(s) IV Continuous <Continuous>  dextrose 5%. 1000 milliLiter(s) IV Continuous <Continuous>  sodium chloride 1 Gram(s) Oral three times a day      PAST MEDICAL/SURGICAL HISTORY  PAST MEDICAL & SURGICAL HISTORY:  Hypertension    Hyperlipidemia    Diabetes  type 2    CAD (coronary artery disease)  cardiac stents x7    Stented coronary artery    Depression    Thyroid disease    Benign hypertrophy of prostate    Lumbar disc disorder    MI (myocardial infarction)  12/2014    Diverticulitis    OA (osteoarthritis)    Lumbar radiculopathy    2019 novel coronavirus disease (COVID-19)    S/P knee replacement, bilateral    S/P shoulder surgery  s/p R rotator cuff surgery    History of cardiac catheterization  2010 2 stents, 2012 3 stents, 12/2014 2 coronary artery stents    History of back surgery  lumbar    H/O heart artery stent    S/P laminectomy with spinal fusion  x 2        SOCIAL HISTORY: Substance Use (street drugs): ( x ) never used  (  ) other:    FAMILY HISTORY:  Family history of heart disease (Sibling)            PHYSICAL EXAM:  T(C): 36.3 (01-05-22 @ 04:03), Max: 36.6 (01-04-22 @ 10:32)  HR: 66 (01-05-22 @ 04:03) (58 - 66)  BP: 136/68 (01-05-22 @ 04:03) (123/60 - 136/68)  RR: 18 (01-05-22 @ 04:03) (18 - 18)  SpO2: 98% (01-05-22 @ 04:03) (98% - 100%)  Wt(kg): --  I&O's Summary    04 Jan 2022 07:01  -  05 Jan 2022 07:00  --------------------------------------------------------  IN: 1080 mL / OUT: 1500 mL / NET: -420 mL          GENERAL: NAD  EYES: EOMI, PERRLA, conjunctiva and sclera clear  ENMT: No tonsillar erythema, exudates, or enlargement  Cardiovascular: Normal S1 S2, No JVD, No murmurs, No edema  Respiratory: Lungs clear to auscultation	  Gastrointestinal:  Soft, Non-tender, + BS	  Extremities: No edema                     8.9    5.18  )-----------( 239      ( 05 Jan 2022 05:43 )             26.2     01-05    136  |  104  |  20  ----------------------------<  159<H>  4.2   |  21<L>  |  1.21    Ca    8.1<L>      05 Jan 2022 05:43  Phos  4.5     01-04  Mg     1.7     01-05    TPro  5.2<L>  /  Alb  2.8<L>  /  TBili  0.5  /  DBili  0.2  /  AST  14  /  ALT  14  /  AlkPhos  43  01-04    proBNP:   Lipid Profile:   HgA1c:   TSH:     Consultant(s) Notes Reviewed:  [x ] YES  [ ] NO    Care Discussed with Consultants/Other Providers [ x] YES  [ ] NO    Imaging Personally Reviewed independently:  [x] YES  [ ] NO    All labs, radiologic studies, vitals, orders and medications list reviewed. Patient is seen and examined at bedside. Case discussed with medical team.

## 2022-01-05 NOTE — PHYSICAL THERAPY INITIAL EVALUATION ADULT - ASR WT BEARING STATUS EVAL
CT head:NO acute bleed or mass.  Abnormal area of low-attenuation involving the right centrum semiovale region.Age-indeterminate lacunar infarct is seen involving the left thalamus. Pt found to be COVID +. LE Duplex : neg DVT. Nephrology following. RRT 1/2 for unresponsiveness on toilet, + black stools; G/I consult for GIB, AC stopped. CTA: neg PE./no weight-bearing restrictions

## 2022-01-05 NOTE — PROGRESS NOTE ADULT - ATTENDING COMMENTS
Pt care and plan discussed and reviewed with PA. Plan as outlined above edited by me to reflect our discussion. Thirty five minutes spent on encounter, of which more than fifty percent of the encounter was spent on counseling and/or coordinating care by the attending physician. OMT on six regions for acute somatic dysfunctions done at the bedside.
Pt care and plan discussed and reviewed with PA. Plan as outlined above edited by me to reflect our discussion. Thirty five minutes spent on encounter, of which more than fifty percent of the encounter was spent on counseling and/or coordinating care by the attending physician. OMT on six regions for acute somatic dysfunctions done at the bedside. Advanced care planning/advanced directives discussed with patient/family. DNR status including forceful chest compressions to attempt to restart the heart, ventilator support/artificial breathing, electric shock, artificial nutrition, health care proxy, Molst form all discussed with pt. Pt wishes to consider.
Pt care and plan discussed and reviewed with PA. Plan as outlined above edited by me to reflect our discussion. Thirty five minutes spent on encounter, of which more than fifty percent of the encounter was spent on counseling and/or coordinating care by the attending physician.
Pt care and plan discussed and reviewed with PA. Plan as outlined above edited by me to reflect our discussion. Thirty five minutes spent on encounter, of which more than fifty percent of the encounter was spent on counseling and/or coordinating care by the attending physician.    Discussed with patient's son   Serial CBC, GI and Card eval
Pt care and plan discussed and reviewed with PA. Plan as outlined above edited by me to reflect our discussion. Thirty five minutes spent on encounter, of which more than fifty percent of the encounter was spent on counseling and/or coordinating care by the attending physician. OMT on six regions for acute somatic dysfunctions done at the bedside.
Pt care and plan discussed and reviewed with PA. Plan as outlined above edited by me to reflect our discussion. Thirty five minutes spent on encounter, of which more than fifty percent of the encounter was spent on counseling and/or coordinating care by the attending physician. OMT on six regions for acute somatic dysfunctions done at the bedside.     D/C planing likely tomorrow with outpatient follow up
Pt care and plan discussed and reviewed with PA. Plan as outlined above edited by me to reflect our discussion. Thirty five minutes spent on encounter, of which more than fifty percent of the encounter was spent on counseling and/or coordinating care by the attending physician. OMT on six regions for acute somatic dysfunctions done at the bedside. Advanced care planning/advanced directives discussed with patient/family. DNR status including forceful chest compressions to attempt to restart the heart, ventilator support/artificial breathing, electric shock, artificial nutrition, health care proxy, Molst form all discussed with pt. Pt wishes to consider.
Pt care and plan discussed and reviewed with PA. Plan as outlined above edited by me to reflect our discussion. Thirty five minutes spent on encounter, of which more than fifty percent of the encounter was spent on counseling and/or coordinating care by the attending physician. OMT on six regions for acute somatic dysfunctions done at the bedside. Advanced care planning/advanced directives discussed with patient/family. DNR status including forceful chest compressions to attempt to restart the heart, ventilator support/artificial breathing, electric shock, artificial nutrition, health care proxy, Molst form all discussed with pt. Pt wishes to consider.    Discussed with his son over the phone

## 2022-01-05 NOTE — PROGRESS NOTE ADULT - SUBJECTIVE AND OBJECTIVE BOX
[facility]  Rodrigo Arriola PA-C  [address] [place] [address]  [phone]      INTERVAL HPI/OVERNIGHT EVENTS:  pt seen and examined, reports melena this morning   H/H stable    MEDICATIONS  (STANDING):  budesonide 160 MICROgram(s)/formoterol 4.5 MICROgram(s) Inhaler 2 Puff(s) Inhalation two times a day  dextrose 40% Gel 15 Gram(s) Oral once  dextrose 5%. 1000 milliLiter(s) (50 mL/Hr) IV Continuous <Continuous>  dextrose 5%. 1000 milliLiter(s) (100 mL/Hr) IV Continuous <Continuous>  dextrose 50% Injectable 25 Gram(s) IV Push once  dextrose 50% Injectable 12.5 Gram(s) IV Push once  dextrose 50% Injectable 25 Gram(s) IV Push once  diltiazem    milliGRAM(s) Oral daily  DULoxetine 20 milliGRAM(s) Oral daily  glucagon  Injectable 1 milliGRAM(s) IntraMuscular once  insulin glargine Injectable (LANTUS) 5 Unit(s) SubCutaneous at bedtime  insulin lispro (ADMELOG) corrective regimen sliding scale   SubCutaneous three times a day before meals  insulin lispro (ADMELOG) corrective regimen sliding scale   SubCutaneous at bedtime  levothyroxine 25 MICROGram(s) Oral daily  metoprolol tartrate 100 milliGRAM(s) Oral two times a day  pantoprazole  Injectable 40 milliGRAM(s) IV Push two times a day  QUEtiapine 25 milliGRAM(s) Oral at bedtime  simvastatin 20 milliGRAM(s) Oral at bedtime  sodium chloride 1 Gram(s) Oral three times a day  tamsulosin 0.4 milliGRAM(s) Oral at bedtime    MEDICATIONS  (PRN):  acetaminophen     Tablet .. 650 milliGRAM(s) Oral every 4 hours PRN Temp greater or equal to 38.5C (101.3F)  benzonatate 100 milliGRAM(s) Oral every 8 hours PRN Cough  hydrocodone/homatropine Syrup 5 milliLiter(s) Oral every 6 hours PRN Cough      Allergies    No Known Allergies    Intolerances        ROS:   General:  No wt loss, fevers, chills, night sweats, fatigue,   Eyes:  Good vision, no reported pain  ENT:  No sore throat, pain, runny nose, dysphagia  CV:  No pain, palpitations, hypo/hypertension  Resp:  No dyspnea, cough, tachypnea, wheezing  GI:  No pain, No nausea, No vomiting, No diarrhea, No constipation, No weight loss, No fever, No pruritis, No rectal bleeding, + tarry stools, No dysphagia,  :  No pain, bleeding, incontinence, nocturia  Muscle:  No pain, weakness  Neuro:  No weakness, tingling, memory problems  Psych:  No fatigue, insomnia, mood problems, depression  Endocrine:  No polyuria, polydipsia, cold/heat intolerance  Heme:  No petechiae, ecchymosis, easy bruisability  Skin:  No rash, tattoos, scars, edema      PHYSICAL EXAM:   Vital Signs Last 24 Hrs  T(C): 36.9 (05 Jan 2022 10:37), Max: 36.9 (05 Jan 2022 10:37)  T(F): 98.4 (05 Jan 2022 10:37), Max: 98.4 (05 Jan 2022 10:37)  HR: 58 (05 Jan 2022 10:37) (58 - 66)  BP: 159/69 (05 Jan 2022 10:37) (132/65 - 159/69)  BP(mean): --  RR: 18 (05 Jan 2022 10:37) (18 - 18)  SpO2: 99% (05 Jan 2022 10:37) (98% - 100%)  Daily     Daily     GENERAL:  Appears stated age,   HEENT:  NC/AT,    CHEST:  Full & symmetric excursion,   HEART:  Regular rhythm,  ABDOMEN:  Soft, non-tender, non-distended,  EXTEREMITIES:  no cyanosis  SKIN:  No rash  NEURO:  Alert,       LABS:                                   8.9    5.18  )-----------( 239      ( 05 Jan 2022 05:43 )             26.2   01-05    136  |  104  |  20  ----------------------------<  159<H>  4.2   |  21<L>  |  1.21    Ca    8.1<L>      05 Jan 2022 05:43  Phos  4.5     01-04  Mg     1.7     01-05    TPro  5.2<L>  /  Alb  2.8<L>  /  TBili  0.5  /  DBili  0.2  /  AST  14  /  ALT  14  /  AlkPhos  43  01-04  PT/INR - ( 04 Jan 2022 08:00 )   PT: 14.6 sec;   INR: 1.23 ratio           RADIOLOGY & ADDITIONAL TESTS:

## 2022-01-05 NOTE — PROGRESS NOTE ADULT - ASSESSMENT
Patient is a 75 y/o M--with a PMHx of hypothyroidism on Synthroid, essential HTN on Lopressor, aldactone, depression on Seroquel and Cymbalta, CAD with S/P cardiac catheterization by Dr. Daryl Ptarick at [place] in Nov 2019, type 2 DM on Januvia and bedtime Levemir, with the patient followed by his PCP above and apparently has had upper nasal congestion and loose BM for the past 3 weeks, with the son concerned with increased confusion and decreased ADL and exertional dyspnoea for the past 3 weeks, but worsening for the past few days, with the patient with an apparent outpatient lab assay with hyponatremia and was sent to the ER by his PCP, apparently with prior episodes of hyponatremia, under the presumption of the hyponatremia as the primary mechanism of the confusional state.  Patient had just received his booster dose of the COVID-10 Moderna 7 days ago.   No fever, no chills, no rigors.  NO sore throat but with upper nasal congestion as above.  NO neck pain.  No dyspnoea at present.  NO chest pain/pressure.  NO HA, no focal weakness.  No diaphoresis.      # ?GIB  --RRT called AM of 01/02 due to AMS and weakness following a dark BM   --Patient started on PPI BID per GI  --GI Consult placed  --Monitor HgB closely, monitor for signs and symptoms of bleeding; per GI transfuse for hgb >8.0   --Hgb down trending from 11.0--9.9--7.5; AC was put on hold  RRT   --CTA with Circumferential wall thickening with periduodenal fat stranding may represent enteritis.--F/U GI recommendations   --F/U Occult -- positive   --per discussion with son, patient was to have EGD outpatient, but due to hypoNa, was cancelled   --S/p 2 units of PRBC on 01/02-- hemoglobin noted to be 8.9 today 01/05, continue to monitor with CBC Q12 per GI (Ordered for 6PM 01/05 and 6AM 01/06)  --F/U GI recommendations, plan for EGD and colonoscopy tomorrow 01/06 per GI      #? Cellulitis   --CTA with --Focal subcutaneous fat stranding along the upper right anterior abdominal wall; correlate for cellulitis.  --Upon exam, non erythematous, edematous  --Continue to monitor patient clinically closely  --Serial abdominal examinations     #COVID-19  --CT head ordered--NO acute bleed or mass.     --Presently NOT requiring supplemental O2 to warrant Decadron.     --Patient is currently saturating 98% on room air  --Discussed with ID attending Dr. Cintron, patient is not in need of Dexamethasone   --Per ID, can continue with Remdesivir for a total of 5 days (Started on 12/28) -- monitor renal function and LFTs   --Monitor inflammatory markers   --Continue with Symbicort 160  --Monitor patient clinically O2 saturation, vital signs and temperature curve       #Elevated D-Dimer  --D-Dimer noted to be 1326 12/31 from 341 12/28  --CT Angio ordered to R/O PE, negative for PE  --VA Duplex LE ordered to R/O DVT--negative for DVT    --Will start patient on full dose Lovenox 75mg BID until imaging results-- Has been stopped 01/02 S/P RRT due to concern for GIB  --Continue to trend D-Dimer       #Hyponatremia.   --CT head   --Continue with Fluid restriction; Monitor Na+ and daily weights.    --Renal evaluation, F/U Recommendations  --Started on Salt tab (Sodium chloride) per renal   --Continue to hold aldactone  --Cymbalta, and Seroquel can be resumed per nephrology; monitor QTC   --Monitor BMP daily -- Na noted to be 130 12/31; F/U nephrology recommendations   --Likely due to SIADH; S/P Tolvaptan 12/30, Salt tabs have been resumed.  -- repeat Urine Na and Osmolality, monitor Serum Na on AM labs -- per nephrology  -- Na noted to be 136 today 01/05       #CAD (coronary artery disease).   --Upgraded to telemetry to exclude cardiac equivalent.  --Continue with Aspirin   --Continue with Simvastatin   --Cardio consult PRN   --Check ECHO -- EF of 65%       #Type 2 diabetes mellitus.   --Continue with ISS for now  --Patient was on bedtime Lantus at home  --Continue to monitor FS and serum glucose levels  --Avoid Hypo/Hyperglycemia  --A1C -- 7.1  --Patient will need outpatient follow up for DM management and control   --Endocrinology consultation PRN   --Glucose levels noted to be elevated, Lantus increased to 10 units at bedtime, continue to  monitor Glucose levels and adjust accordingly  --patient was on 36 units at home, however patient may be scoped by GI, an is on clear diet for now, will continue to monitor and adjust accordingly       #Productive cough  --Patient reporting productive cough with white sputum  --Sputum culture-- Moderate polymorphonuclear leukocytes per low power field, Few Squamous epithelial cells per low power field, Numerous Gram Positive Cocci in Pairs and Chains seen per oil power field  --Throat culture --Negative   --Tessalon perle for cough  --Cough syrup PRN for cough   --Per patient, cough and sore throat have been improving       #Sore throat  --Patient endorsing sore throat  --Throat culture-- negative   --Tylenol for pain control   --Per patient, cough and sore throat have been improving       #HTN  --Monitor Vital Signs and BP closely  --Continue with home medication Lopressor for now  --Aldactone is on hold as may cause HypoNa  --Monitor BP   --Check Orthostatics  --Cardiology consult placed-- F/U recommendations       #Loose bowels  --Per patient experienced loose bowel outpatient   --If occurs and of watery consistency check for C.Diff   --Likely due to COVID  --Blood culture X 2 with No growth   --Urine culture with probable contamination and patient is currently asymptomatic  --Continue to monitor       #Depression  --Per nephrology can resume home medications-- Seroquel and Cymbalta that were previously on hold due to hyponatremia   --Continue to monitor patient and monitor QTC   --Psych consult PRN       #Hypothyroidism   --TSH on this admission-- WNL at 2.78  --Continue with home dose Synthroid       #Bradycardia   --Patient with bradycardia on tele   --At time of visit, patient was asymptomatic   --Continue to monitor on Tele and close monitoring of patient     #Need for prophylactic measure.   -- DVT prophylaxis currently on hold   --Per GI, ASA is currently on hold as well    --Patient will need outpatient follow up for CT Head findings       --Discussed with Son via telephone at patient's bedside

## 2022-01-05 NOTE — PROGRESS NOTE ADULT - ASSESSMENT
76y Male with history of HTN, DM, depression presents with confusion. Nephrology consulted for hyponatremia.    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 Serum Na improved s/p tolvaptan on 12/30. Continue with sodium chloride 1 gram TID and 1L FR. Monitor serum Na.    2) HTN: BP controlled. Continue with current medications. Monitor BP.    3) LE edema: Patient appears euvolemic and TTE unremarkable. Unclear why patient on aldactone. Would continue to hold for now. Monitor UO.    4) Depression: On cymbalta and seroquel. Both medications have been reported to cause hyponatremia however frequency is generally low. No renal objection to continue medications as needed for depression.    [facility]  Tr Begum M.D.  Kelechi Chaudhry D.O.  Mariam Bell M.D.  Radha Burnett, MSN, ANP-C    Telephone: [phone]  Facsimile: [phone]    [address] [place] [address]

## 2022-01-05 NOTE — PROGRESS NOTE ADULT - ASSESSMENT
gi bleed  acute blood loss anemia  covid    pt had drop in hgb with dark stool  suspected duodenal ulcer  CT showing thickening   PPI bid  asa on hold  transfuse to hgb >8  cbc bid  EGD and colonoscopy scheduled for Tomorrow  prep ordered   CLD  NPO after midnight   will follow  jorge rivera

## 2022-01-05 NOTE — PHARMACOTHERAPY INTERVENTION NOTE - COMMENTS
Performed medication reconciliation and home medication list updated in outpatient medication review. Medications verified with patient's son, PCP Dr. Diane [address]  [phone], and [facility] pharmacy.    Home Medications:  aspirin 81 mg oral delayed release tablet: 1 tab(s) orally once a day  Cymbalta 20 mg oral delayed release capsule daily  Januvia 100 mg oral tablet: 1 tab(s) orally once a day   Levemir 100 units/mL subcutaneous solution: 36 unit(s) subcutaneous once a day (at bedtime)   levocetirizine 5 mg oral tablet: 1 tab(s) orally once a day (in the evening)   levothyroxine 25 mcg (0.025 mg) oral tablet: 1 tab(s) orally once a day   Lopressor 100 mg oral tablet: 1 tab(s) orally 2 times a day  montelukast 10 mg oral tablet: 1 tab(s) orally once a day   Multiple Vitamins oral capsule: 1 cap(s) orally once a day   SEROquel 25 mg oral tablet: orally once a day (at bedtime)   Sodium Chloride 1 g oral tablet: orally 3 times a day  spironolactone 25 mg oral tablet: 1 tab(s) orally once a day     Added:  simvastatin 20 mg oral tablet: 1 tab(s) orally once a day (at bedtime)   Symbicort 160 mcg-4.5 mcg/inh inhalation aerosol: 2 puff(s) inhaled 2 times a day   tamsulosin 0.4 mg oral capsule: 1 cap(s) orally once a day   Vascepa 1 g oral capsule: 2 cap(s) orally 2 times a   dilTIAZem 300 mg/24 hours oral capsule, extended release: 1 cap(s) orally once a day     Please refer to specifics in home medication list (outpatient medication review).    Recommendations:   - Januvia/ Levemir on hold; pt covered on SSI - FS this morning 181. Recommend continuing SSI and trending FS.   - Holding spironolactone, cymbalta, seroquel due to hyponatremia.   - Pt currently on levothyroxine 50mcg daily. Confirmed med hx with PCP, family, and pharmacy, pt is on levothyroxine 25mg daily. Recommend reducing levothyroxine back to 25mcg daily.   - Recommend reconciling other home medications (simvastatin, tamsulosin, diltiazem, symbicort) if no contraindications. No reported DIANE of hyponatremia with these medications.     Anna Burns PharmD, [facility]  Clinical Pharmacy Specialist  [phone] or Teams
75 YO M, currently on Lantus 10 units (dose increased on 1/3) and low dose SSI for DM coverage.     Home Therapy: Levemir 36 units HS, Januvia 100mg daily    HgbA1C: 7.1 (12/28)    Fingersticks:  1/3: 241/ 229/ 220/ 313 (6 units of correctional insulin)  1/4: 197/ 228/ 212/ 294 (6 units of correctional insulin)  1/5: 141/ 258    Diet: consistent carb clear liquid diet    Recommend: FS have been generally above goal >200's. AM FS are more reasonably at goal or almost at goal, therefore would recommend to maintain current Lantus dosing and add Admelog. Recommend starting low dose 2 units SQ before meals. If pt is NPO or not eating, would not give Admelog. Monitor FS and titrate insulin as needed.     Anna Burns, PharmD, [facility]  Clinical Pharmacy Specialist  [phone] or Teams

## 2022-01-05 NOTE — PROGRESS NOTE ADULT - SUBJECTIVE AND OBJECTIVE BOX
Name of Patient : JERRI SIDHU  MRN: [id number]  Date of visit: 01-05-22 @ 09:18      Subjective: Patient seen and examined. No new events except as noted.     REVIEW OF SYSTEMS:    CONSTITUTIONAL: No weakness, fevers or chills  EYES/ENT: No visual changes;  No vertigo or throat pain   NECK: No pain or stiffness  RESPIRATORY: No cough, wheezing, hemoptysis; No shortness of breath  CARDIOVASCULAR: No chest pain or palpitations  GASTROINTESTINAL: No abdominal or epigastric pain. No nausea, vomiting, or hematemesis; No diarrhea or constipation. No melena or hematochezia.  GENITOURINARY: No dysuria, frequency or hematuria  NEUROLOGICAL: No numbness or weakness  SKIN: No itching, burning, rashes, or lesions   All other review of systems is negative unless indicated above.    MEDICATIONS:  MEDICATIONS  (STANDING):  budesonide 160 MICROgram(s)/formoterol 4.5 MICROgram(s) Inhaler 2 Puff(s) Inhalation two times a day  dextrose 40% Gel 15 Gram(s) Oral once  dextrose 5%. 1000 milliLiter(s) (50 mL/Hr) IV Continuous <Continuous>  dextrose 5%. 1000 milliLiter(s) (100 mL/Hr) IV Continuous <Continuous>  dextrose 50% Injectable 25 Gram(s) IV Push once  dextrose 50% Injectable 12.5 Gram(s) IV Push once  dextrose 50% Injectable 25 Gram(s) IV Push once  diltiazem    milliGRAM(s) Oral daily  DULoxetine 20 milliGRAM(s) Oral daily  glucagon  Injectable 1 milliGRAM(s) IntraMuscular once  insulin glargine Injectable (LANTUS) 10 Unit(s) SubCutaneous at bedtime  insulin lispro (ADMELOG) corrective regimen sliding scale   SubCutaneous three times a day before meals  insulin lispro (ADMELOG) corrective regimen sliding scale   SubCutaneous at bedtime  levothyroxine 25 MICROGram(s) Oral daily  metoprolol tartrate 100 milliGRAM(s) Oral two times a day  pantoprazole  Injectable 40 milliGRAM(s) IV Push two times a day  QUEtiapine 25 milliGRAM(s) Oral at bedtime  simvastatin 20 milliGRAM(s) Oral at bedtime  sodium chloride 1 Gram(s) Oral three times a day  tamsulosin 0.4 milliGRAM(s) Oral at bedtime      PHYSICAL EXAM:  T(C): 36.3 (01-05-22 @ 04:03), Max: 36.6 (01-04-22 @ 10:32)  HR: 66 (01-05-22 @ 04:03) (58 - 66)  BP: 136/68 (01-05-22 @ 04:03) (123/60 - 136/68)  RR: 18 (01-05-22 @ 04:03) (18 - 18)  SpO2: 98% (01-05-22 @ 04:03) (98% - 100%)  Wt(kg): --  I&O's Summary    04 Jan 2022 07:01  -  05 Jan 2022 07:00  --------------------------------------------------------  IN: 1080 mL / OUT: 1500 mL / NET: -420 mL          Appearance: Normal	  HEENT:  PERRLA   Lymphatic: No lymphadenopathy   Cardiovascular: Normal S1 S2, no JVD  Respiratory: normal effort , clear  Gastrointestinal:  Soft, Non-tender  Skin: No rashes,  warm to touch  Psychiatry:  Mood & affect appropriate  Musculuskeletal: No edema          01-04-22 @ 07:01  -  01-05-22 @ 07:00  --------------------------------------------------------  IN: 1080 mL / OUT: 1500 mL / NET: -420 mL                                  8.9    5.18  )-----------( 239      ( 05 Jan 2022 05:43 )             26.2               01-05    136  |  104  |  20  ----------------------------<  159<H>  4.2   |  21<L>  |  1.21    Ca    8.1<L>      05 Jan 2022 05:43  Phos  4.5     01-04  Mg     1.7     01-05    TPro  5.2<L>  /  Alb  2.8<L>  /  TBili  0.5  /  DBili  0.2  /  AST  14  /  ALT  14  /  AlkPhos  43  01-04    PT/INR - ( 04 Jan 2022 08:00 )   PT: 14.6 sec;   INR: 1.23 ratio                               Name of Patient : JERRI SIDHU  MRN: [id number]  Date of visit: 01-05-22 @ 09:18      Subjective: Patient seen and examined. No new events except as noted.   Patient is doing okay. Seen sitting in chair. Denies chest pain, palpitations, shortness of breath or difficulty breathing.   Patient reports that his cough is improving.   Patient is currently afebrile and saturating well on room air.     REVIEW OF SYSTEMS:    CONSTITUTIONAL: No weakness, fevers or chills  EYES/ENT: No visual changes;  No vertigo or throat pain   NECK: No pain or stiffness  RESPIRATORY: Cough is improving; Denies shortness of breath, difficulty breathing.   CARDIOVASCULAR: No chest pain or palpitations  GASTROINTESTINAL: No abdominal or epigastric pain. No nausea, vomiting, or hematemesis; No diarrhea or constipation. No melena or hematochezia.  GENITOURINARY: No dysuria, frequency or hematuria  NEUROLOGICAL: No numbness or weakness  SKIN: No itching, burning, rashes, or lesions   All other review of systems is negative unless indicated above.    MEDICATIONS:  MEDICATIONS  (STANDING):  budesonide 160 MICROgram(s)/formoterol 4.5 MICROgram(s) Inhaler 2 Puff(s) Inhalation two times a day  dextrose 40% Gel 15 Gram(s) Oral once  dextrose 5%. 1000 milliLiter(s) (50 mL/Hr) IV Continuous <Continuous>  dextrose 5%. 1000 milliLiter(s) (100 mL/Hr) IV Continuous <Continuous>  dextrose 50% Injectable 25 Gram(s) IV Push once  dextrose 50% Injectable 12.5 Gram(s) IV Push once  dextrose 50% Injectable 25 Gram(s) IV Push once  diltiazem    milliGRAM(s) Oral daily  DULoxetine 20 milliGRAM(s) Oral daily  glucagon  Injectable 1 milliGRAM(s) IntraMuscular once  insulin glargine Injectable (LANTUS) 10 Unit(s) SubCutaneous at bedtime  insulin lispro (ADMELOG) corrective regimen sliding scale   SubCutaneous three times a day before meals  insulin lispro (ADMELOG) corrective regimen sliding scale   SubCutaneous at bedtime  levothyroxine 25 MICROGram(s) Oral daily  metoprolol tartrate 100 milliGRAM(s) Oral two times a day  pantoprazole  Injectable 40 milliGRAM(s) IV Push two times a day  QUEtiapine 25 milliGRAM(s) Oral at bedtime  simvastatin 20 milliGRAM(s) Oral at bedtime  sodium chloride 1 Gram(s) Oral three times a day  tamsulosin 0.4 milliGRAM(s) Oral at bedtime      PHYSICAL EXAM:  T(C): 36.3 (01-05-22 @ 04:03), Max: 36.6 (01-04-22 @ 10:32)  HR: 66 (01-05-22 @ 04:03) (58 - 66)  BP: 136/68 (01-05-22 @ 04:03) (123/60 - 136/68)  RR: 18 (01-05-22 @ 04:03) (18 - 18)  SpO2: 98% (01-05-22 @ 04:03) (98% - 100%)  Wt(kg): --  I&O's Summary    04 Jan 2022 07:01  -  05 Jan 2022 07:00  --------------------------------------------------------  IN: 1080 mL / OUT: 1500 mL / NET: -420 mL          Appearance: Normal, calm, sitting up in chair   HEENT:  PERRLA   Lymphatic: No lymphadenopathy   Cardiovascular: Normal S1 S2, no JVD  Respiratory: normal effort , clear  Gastrointestinal:  Soft, Non-tender, no erythema, no edema  Skin: No rashes,  warm to touch  Psychiatry:  Mood & affect appropriate  Musculoskeletal: No edema          01-04-22 @ 07:01  -  01-05-22 @ 07:00  --------------------------------------------------------  IN: 1080 mL / OUT: 1500 mL / NET: -420 mL                                  8.9    5.18  )-----------( 239      ( 05 Jan 2022 05:43 )             26.2               01-05    136  |  104  |  20  ----------------------------<  159<H>  4.2   |  21<L>  |  1.21    Ca    8.1<L>      05 Jan 2022 05:43  Phos  4.5     01-04  Mg     1.7     01-05    TPro  5.2<L>  /  Alb  2.8<L>  /  TBili  0.5  /  DBili  0.2  /  AST  14  /  ALT  14  /  AlkPhos  43  01-04    PT/INR - ( 04 Jan 2022 08:00 )   PT: 14.6 sec;   INR: 1.23 ratio

## 2022-01-05 NOTE — PHYSICAL THERAPY INITIAL EVALUATION ADULT - PRECAUTIONS/LIMITATIONS, REHAB EVAL
Pt's son (who is RN at [employer]) was concerned with inc confusion and decADL and GOTTLIEB for the past 3 weeks, but worsening for the past few days. Pt had an apparent outpatient lab assay with hyponatremia and was sent to the ER by his PCP, under the presumption of the hyponatremia as the primary mechanism of the confusional state.  Pt had his booster dose of the COVID-10 Moderna 7 days ago/isolation precautions

## 2022-01-05 NOTE — PROGRESS NOTE ADULT - SUBJECTIVE AND OBJECTIVE BOX
[facility] NEPHROLOGY- PROGRESS NOTE    76y Male with history of HTN, DM, depression presents with confusion. Nephrology consulted for hyponatremia.    REVIEW OF SYSTEMS:  Gen: no changes in weight  Cards: no chest pain  Resp: no dyspnea  GI: no nausea or vomiting or diarrhea  Vascular: no LE edema    No Known Allergies      Hospital Medications: Medications reviewed      VITALS:  T(F): 98.4 (01-05-22 @ 10:37), Max: 98.4 (01-05-22 @ 10:37)  HR: 62 (01-05-22 @ 11:13)  BP: 132/74 (01-05-22 @ 11:13)  RR: 18 (01-05-22 @ 10:37)  SpO2: 99% (01-05-22 @ 11:13)  Wt(kg): --    01-04 @ 07:01  -  01-05 @ 07:00  --------------------------------------------------------  IN: 1080 mL / OUT: 1500 mL / NET: -420 mL    01-05 @ 07:01  -  01-05 @ 13:28  --------------------------------------------------------  IN: 360 mL / OUT: 300 mL / NET: 60 mL        PHYSICAL EXAM:    Gen: NAD, calm  Cards: RRR, +S1/S2, no M/G/R  Resp: course BS B/L  GI: soft, NT/ND, NABS  Vascular: no LE edema B/L      LABS:  01-05    136  |  104  |  20  ----------------------------<  159<H>  4.2   |  21<L>  |  1.21    Ca    8.1<L>      05 Jan 2022 05:43  Phos  4.5     01-04  Mg     1.7     01-05    TPro  5.2<L>  /  Alb  2.8<L>  /  TBili  0.5  /  DBili  0.2  /  AST  14  /  ALT  14  /  AlkPhos  43  01-04    Creatinine Trend: 1.21 <--, 1.21 <--, 1.30 <--, 1.22 <--, 1.24 <--, 1.24 <--, 1.08 <--, 1.05 <--, 1.02 <--, 1.17 <--                        8.9    5.18  )-----------( 239      ( 05 Jan 2022 05:43 )             26.2     Urine Studies:    Osmolality, Random Urine: 572 mos/kg (12-30 @ 15:00)  Sodium, Random Urine: 49 mmol/L (12-30 @ 07:10)

## 2022-01-05 NOTE — PHYSICAL THERAPY INITIAL EVALUATION ADULT - PERTINENT HX OF CURRENT PROBLEM, REHAB EVAL
77 y/o M seen with PMH of hypothyroidism on Synthroid, essential HTN on Lopressor, aldactone, depression on Seroquel and Cymbalta, CAD with S/P cardiac catheterization by Dr. Daryl Patrick at [place] in Nov 2019, type 2 DM on Januvia and bedtime Levemir, with the pt followed by his PCP above and apparently has had upper nasal congestion and loose BM for the past 3 weeks.

## 2022-01-06 VITALS
DIASTOLIC BLOOD PRESSURE: 75 MMHG | SYSTOLIC BLOOD PRESSURE: 136 MMHG | OXYGEN SATURATION: 98 % | TEMPERATURE: 98 F | RESPIRATION RATE: 18 BRPM | HEART RATE: 70 BPM

## 2022-01-06 LAB
ANION GAP SERPL CALC-SCNC: 13 MMOL/L — SIGNIFICANT CHANGE UP (ref 5–17)
BUN SERPL-MCNC: 16 MG/DL — SIGNIFICANT CHANGE UP (ref 7–23)
CALCIUM SERPL-MCNC: 8.9 MG/DL — SIGNIFICANT CHANGE UP (ref 8.4–10.5)
CHLORIDE SERPL-SCNC: 108 MMOL/L — SIGNIFICANT CHANGE UP (ref 96–108)
CO2 SERPL-SCNC: 18 MMOL/L — LOW (ref 22–31)
CREAT SERPL-MCNC: 1.14 MG/DL — SIGNIFICANT CHANGE UP (ref 0.5–1.3)
GLUCOSE SERPL-MCNC: 133 MG/DL — HIGH (ref 70–99)
HCT VFR BLD CALC: 29.5 % — LOW (ref 39–50)
HGB BLD-MCNC: 9.8 G/DL — LOW (ref 13–17)
MCHC RBC-ENTMCNC: 32 PG — SIGNIFICANT CHANGE UP (ref 27–34)
MCHC RBC-ENTMCNC: 33.2 GM/DL — SIGNIFICANT CHANGE UP (ref 32–36)
MCV RBC AUTO: 96.4 FL — SIGNIFICANT CHANGE UP (ref 80–100)
NRBC # BLD: 0 /100 WBCS — SIGNIFICANT CHANGE UP (ref 0–0)
PLATELET # BLD AUTO: 287 K/UL — SIGNIFICANT CHANGE UP (ref 150–400)
POTASSIUM SERPL-MCNC: 3.7 MMOL/L — SIGNIFICANT CHANGE UP (ref 3.5–5.3)
POTASSIUM SERPL-SCNC: 3.7 MMOL/L — SIGNIFICANT CHANGE UP (ref 3.5–5.3)
RBC # BLD: 3.06 M/UL — LOW (ref 4.2–5.8)
RBC # FLD: 15.9 % — HIGH (ref 10.3–14.5)
SODIUM SERPL-SCNC: 139 MMOL/L — SIGNIFICANT CHANGE UP (ref 135–145)
WBC # BLD: 5.26 K/UL — SIGNIFICANT CHANGE UP (ref 3.8–10.5)
WBC # FLD AUTO: 5.26 K/UL — SIGNIFICANT CHANGE UP (ref 3.8–10.5)

## 2022-01-06 PROCEDURE — 93970 EXTREMITY STUDY: CPT

## 2022-01-06 PROCEDURE — 84100 ASSAY OF PHOSPHORUS: CPT

## 2022-01-06 PROCEDURE — 71275 CT ANGIOGRAPHY CHEST: CPT

## 2022-01-06 PROCEDURE — 85379 FIBRIN DEGRADATION QUANT: CPT

## 2022-01-06 PROCEDURE — 86850 RBC ANTIBODY SCREEN: CPT

## 2022-01-06 PROCEDURE — 85027 COMPLETE CBC AUTOMATED: CPT

## 2022-01-06 PROCEDURE — 83615 LACTATE (LD) (LDH) ENZYME: CPT

## 2022-01-06 PROCEDURE — 93306 TTE W/DOPPLER COMPLETE: CPT

## 2022-01-06 PROCEDURE — 99285 EMERGENCY DEPT VISIT HI MDM: CPT | Mod: 25

## 2022-01-06 PROCEDURE — P9040: CPT

## 2022-01-06 PROCEDURE — 82962 GLUCOSE BLOOD TEST: CPT

## 2022-01-06 PROCEDURE — 80053 COMPREHEN METABOLIC PANEL: CPT

## 2022-01-06 PROCEDURE — 84484 ASSAY OF TROPONIN QUANT: CPT

## 2022-01-06 PROCEDURE — 83880 ASSAY OF NATRIURETIC PEPTIDE: CPT

## 2022-01-06 PROCEDURE — 87081 CULTURE SCREEN ONLY: CPT

## 2022-01-06 PROCEDURE — U0003: CPT

## 2022-01-06 PROCEDURE — 87070 CULTURE OTHR SPECIMN AEROBIC: CPT

## 2022-01-06 PROCEDURE — 83935 ASSAY OF URINE OSMOLALITY: CPT

## 2022-01-06 PROCEDURE — 81001 URINALYSIS AUTO W/SCOPE: CPT

## 2022-01-06 PROCEDURE — 93005 ELECTROCARDIOGRAM TRACING: CPT

## 2022-01-06 PROCEDURE — 80076 HEPATIC FUNCTION PANEL: CPT

## 2022-01-06 PROCEDURE — 87077 CULTURE AEROBIC IDENTIFY: CPT

## 2022-01-06 PROCEDURE — 87086 URINE CULTURE/COLONY COUNT: CPT

## 2022-01-06 PROCEDURE — 88305 TISSUE EXAM BY PATHOLOGIST: CPT | Mod: 26

## 2022-01-06 PROCEDURE — 80048 BASIC METABOLIC PNL TOTAL CA: CPT

## 2022-01-06 PROCEDURE — 87040 BLOOD CULTURE FOR BACTERIA: CPT

## 2022-01-06 PROCEDURE — 85610 PROTHROMBIN TIME: CPT

## 2022-01-06 PROCEDURE — 86901 BLOOD TYPING SEROLOGIC RH(D): CPT

## 2022-01-06 PROCEDURE — 97162 PT EVAL MOD COMPLEX 30 MIN: CPT

## 2022-01-06 PROCEDURE — 36430 TRANSFUSION BLD/BLD COMPNT: CPT

## 2022-01-06 PROCEDURE — 85025 COMPLETE CBC W/AUTO DIFF WBC: CPT

## 2022-01-06 PROCEDURE — 82570 ASSAY OF URINE CREATININE: CPT

## 2022-01-06 PROCEDURE — 84133 ASSAY OF URINE POTASSIUM: CPT

## 2022-01-06 PROCEDURE — 86803 HEPATITIS C AB TEST: CPT

## 2022-01-06 PROCEDURE — 84443 ASSAY THYROID STIM HORMONE: CPT

## 2022-01-06 PROCEDURE — 86923 COMPATIBILITY TEST ELECTRIC: CPT

## 2022-01-06 PROCEDURE — 82272 OCCULT BLD FECES 1-3 TESTS: CPT

## 2022-01-06 PROCEDURE — 83735 ASSAY OF MAGNESIUM: CPT

## 2022-01-06 PROCEDURE — 70450 CT HEAD/BRAIN W/O DYE: CPT

## 2022-01-06 PROCEDURE — 82565 ASSAY OF CREATININE: CPT

## 2022-01-06 PROCEDURE — 83930 ASSAY OF BLOOD OSMOLALITY: CPT

## 2022-01-06 PROCEDURE — 86900 BLOOD TYPING SEROLOGIC ABO: CPT

## 2022-01-06 PROCEDURE — 84300 ASSAY OF URINE SODIUM: CPT

## 2022-01-06 PROCEDURE — 71045 X-RAY EXAM CHEST 1 VIEW: CPT

## 2022-01-06 PROCEDURE — P9016: CPT

## 2022-01-06 PROCEDURE — 84550 ASSAY OF BLOOD/URIC ACID: CPT

## 2022-01-06 PROCEDURE — 82533 TOTAL CORTISOL: CPT

## 2022-01-06 PROCEDURE — 86140 C-REACTIVE PROTEIN: CPT

## 2022-01-06 PROCEDURE — 84105 ASSAY OF URINE PHOSPHORUS: CPT

## 2022-01-06 PROCEDURE — 94640 AIRWAY INHALATION TREATMENT: CPT

## 2022-01-06 PROCEDURE — U0005: CPT

## 2022-01-06 PROCEDURE — 82550 ASSAY OF CK (CPK): CPT

## 2022-01-06 PROCEDURE — 36415 COLL VENOUS BLD VENIPUNCTURE: CPT

## 2022-01-06 PROCEDURE — 83036 HEMOGLOBIN GLYCOSYLATED A1C: CPT

## 2022-01-06 PROCEDURE — 88305 TISSUE EXAM BY PATHOLOGIST: CPT

## 2022-01-06 RX ORDER — LEVOCETIRIZINE DIHYDROCHLORIDE 0.5 MG/ML
1 SOLUTION ORAL
Qty: 0 | Refills: 0 | DISCHARGE

## 2022-01-06 RX ORDER — INSULIN DETEMIR 100/ML (3)
36 INSULIN PEN (ML) SUBCUTANEOUS
Qty: 0 | Refills: 0 | DISCHARGE

## 2022-01-06 RX ORDER — SODIUM CHLORIDE 9 MG/ML
500 INJECTION INTRAMUSCULAR; INTRAVENOUS; SUBCUTANEOUS
Refills: 0 | Status: DISCONTINUED | OUTPATIENT
Start: 2022-01-06 | End: 2022-01-06

## 2022-01-06 RX ORDER — SPIRONOLACTONE 25 MG/1
1 TABLET, FILM COATED ORAL
Qty: 0 | Refills: 0 | DISCHARGE

## 2022-01-06 RX ORDER — SODIUM CHLORIDE 9 MG/ML
0 INJECTION INTRAMUSCULAR; INTRAVENOUS; SUBCUTANEOUS
Qty: 0 | Refills: 0 | DISCHARGE

## 2022-01-06 RX ORDER — PANTOPRAZOLE SODIUM 20 MG/1
1 TABLET, DELAYED RELEASE ORAL
Qty: 60 | Refills: 0
Start: 2022-01-06 | End: 2022-02-04

## 2022-01-06 RX ADMIN — Medication 100 MILLIGRAM(S): at 17:39

## 2022-01-06 RX ADMIN — Medication 100 MILLIGRAM(S): at 05:24

## 2022-01-06 RX ADMIN — PANTOPRAZOLE SODIUM 40 MILLIGRAM(S): 20 TABLET, DELAYED RELEASE ORAL at 05:24

## 2022-01-06 RX ADMIN — Medication 3: at 01:22

## 2022-01-06 RX ADMIN — Medication 300 MILLIGRAM(S): at 05:24

## 2022-01-06 RX ADMIN — BUDESONIDE AND FORMOTEROL FUMARATE DIHYDRATE 2 PUFF(S): 160; 4.5 AEROSOL RESPIRATORY (INHALATION) at 08:16

## 2022-01-06 RX ADMIN — Medication 2 UNIT(S): at 17:44

## 2022-01-06 RX ADMIN — PANTOPRAZOLE SODIUM 40 MILLIGRAM(S): 20 TABLET, DELAYED RELEASE ORAL at 17:43

## 2022-01-06 RX ADMIN — Medication 25 MICROGRAM(S): at 05:24

## 2022-01-06 RX ADMIN — DULOXETINE HYDROCHLORIDE 20 MILLIGRAM(S): 30 CAPSULE, DELAYED RELEASE ORAL at 12:11

## 2022-01-06 RX ADMIN — Medication 1: at 12:30

## 2022-01-06 RX ADMIN — BUDESONIDE AND FORMOTEROL FUMARATE DIHYDRATE 2 PUFF(S): 160; 4.5 AEROSOL RESPIRATORY (INHALATION) at 17:43

## 2022-01-06 RX ADMIN — Medication 2: at 17:43

## 2022-01-06 RX ADMIN — SODIUM CHLORIDE 1 GRAM(S): 9 INJECTION INTRAMUSCULAR; INTRAVENOUS; SUBCUTANEOUS at 05:24

## 2022-01-06 NOTE — PROGRESS NOTE ADULT - SUBJECTIVE AND OBJECTIVE BOX
Name of Patient : JERRI SIDHU  MRN: [id number]  Date of visit: 01-06-22 @ 14:56      Subjective: Patient seen and examined. No new events except as noted.   Patient seen early this morning. Patient is going for EGD/ Colonoscopy with GI today 01/06.  Patient states he is doing okay. Denies chest pain, palpitations, shortness of breath or difficulty breathing.   Saturating 100% on room air.     REVIEW OF SYSTEMS:    CONSTITUTIONAL: No weakness, fevers or chills  EYES/ENT: No visual changes;  No vertigo or throat pain   NECK: No pain or stiffness  RESPIRATORY: No cough, wheezing, hemoptysis; No shortness of breath  CARDIOVASCULAR: No chest pain or palpitations  GASTROINTESTINAL: No abdominal or epigastric pain. No nausea, vomiting, or hematemesis; No diarrhea or constipation. No melena or hematochezia.  GENITOURINARY: No dysuria, frequency or hematuria  NEUROLOGICAL: No numbness or weakness  SKIN: No itching, burning, rashes, or lesions   All other review of systems is negative unless indicated above.    MEDICATIONS:  MEDICATIONS  (STANDING):  budesonide 160 MICROgram(s)/formoterol 4.5 MICROgram(s) Inhaler 2 Puff(s) Inhalation two times a day  dextrose 40% Gel 15 Gram(s) Oral once  dextrose 5%. 1000 milliLiter(s) (50 mL/Hr) IV Continuous <Continuous>  dextrose 5%. 1000 milliLiter(s) (100 mL/Hr) IV Continuous <Continuous>  dextrose 50% Injectable 25 Gram(s) IV Push once  dextrose 50% Injectable 12.5 Gram(s) IV Push once  dextrose 50% Injectable 25 Gram(s) IV Push once  diltiazem    milliGRAM(s) Oral daily  DULoxetine 20 milliGRAM(s) Oral daily  glucagon  Injectable 1 milliGRAM(s) IntraMuscular once  insulin glargine Injectable (LANTUS) 10 Unit(s) SubCutaneous at bedtime  insulin lispro (ADMELOG) corrective regimen sliding scale   SubCutaneous every 6 hours  insulin lispro (ADMELOG) corrective regimen sliding scale   SubCutaneous at bedtime  insulin lispro Injectable (ADMELOG) 2 Unit(s) SubCutaneous three times a day before meals  levothyroxine 25 MICROGram(s) Oral daily  metoprolol tartrate 100 milliGRAM(s) Oral two times a day  pantoprazole  Injectable 40 milliGRAM(s) IV Push two times a day  QUEtiapine 25 milliGRAM(s) Oral at bedtime  simvastatin 20 milliGRAM(s) Oral at bedtime  sodium chloride 0.9%. 500 milliLiter(s) (30 mL/Hr) IV Continuous <Continuous>  tamsulosin 0.4 milliGRAM(s) Oral at bedtime      PHYSICAL EXAM:  T(C): 36 (01-06-22 @ 12:55), Max: 37 (01-06-22 @ 10:34)  HR: 70 (01-06-22 @ 14:10) (65 - 75)  BP: 137/66 (01-06-22 @ 14:10) (106/53 - 160/75)  RR: 14 (01-06-22 @ 14:10) (14 - 20)  SpO2: 100% (01-06-22 @ 14:10) (99% - 100%)  Wt(kg): --  I&O's Summary    05 Jan 2022 07:01  -  06 Jan 2022 07:00  --------------------------------------------------------  IN: 1360 mL / OUT: 750 mL / NET: 610 mL      Height (cm): 165.1 (01-06 @ 12:47)  Weight (kg): 74.8 (01-06 @ 12:47)  BMI (kg/m2): 27.4 (01-06 @ 12:47)  BSA (m2): 1.82 (01-06 @ 12:47)    Appearance: Normal	  HEENT:  PERRLA   Lymphatic: No lymphadenopathy   Cardiovascular: Normal S1 S2, no JVD  Respiratory: normal effort , clear  Gastrointestinal:  Soft, Non-tender, no erythema, no edema   Skin: No rashes,  warm to touch  Psychiatry:  Mood & affect appropriate  Musculuskeletal: No edema      01-05-22 @ 07:01  -  01-06-22 @ 07:00  --------------------------------------------------------  IN: 1360 mL / OUT: 750 mL / NET: 610 mL                            9.8    5.26  )-----------( 287      ( 06 Jan 2022 06:31 )             29.5               01-06    139  |  108  |  16  ----------------------------<  133<H>  3.7   |  18<L>  |  1.14    Ca    8.9      06 Jan 2022 06:31  Mg     1.7     01-05

## 2022-01-06 NOTE — DISCHARGE NOTE NURSING/CASE MANAGEMENT/SOCIAL WORK - NSDCPEFALRISK_GEN_ALL_CORE
For information on Fall & Injury Prevention, visit: [email or web address] OR  [email or web address] OR  https://www.cdc.gov/steadi/patient.html

## 2022-01-06 NOTE — PROGRESS NOTE ADULT - ASSESSMENT
Patient is a 77 y/o M--with a PMHx of hypothyroidism on Synthroid, essential HTN on Lopressor, aldactone, depression on Seroquel and Cymbalta, CAD with S/P cardiac catheterization by Dr. Daryl Patrick at [place] in Nov 2019, type 2 DM on Januvia and bedtime Levemir, with the patient followed by his PCP above and apparently has had upper nasal congestion and loose BM for the past 3 weeks, with the son concerned with increased confusion and decreased ADL and exertional dyspnoea for the past 3 weeks, but worsening for the past few days, with the patient with an apparent outpatient lab assay with hyponatremia and was sent to the ER by his PCP, apparently with prior episodes of hyponatremia, under the presumption of the hyponatremia as the primary mechanism of the confusional state.  Patient had just received his booster dose of the COVID-10 Moderna 7 days ago.   No fever, no chills, no rigors.  NO sore throat but with upper nasal congestion as above.  NO neck pain.  No dyspnoea at present.  NO chest pain/pressure.  NO HA, no focal weakness.  No diaphoresis.      # ?GIB  --RRT called AM of 01/02 due to AMS and weakness following a dark BM   --Patient started on PPI BID per GI  --GI Consult placed  --Monitor HgB closely, monitor for signs and symptoms of bleeding; per GI transfuse for hgb >8.0   --Hgb down trending from 11.0--9.9--7.5; AC was put on hold  RRT   --CTA with Circumferential wall thickening with periduodenal fat stranding may represent enteritis.--F/U GI recommendations   --F/U Occult -- positive   --per discussion with son, patient was to have EGD outpatient, but due to hypoNa, was cancelled   --S/p 2 units of PRBC on 01/02-- hemoglobin noted to be 8.9 today 01/05, continue to monitor with CBC Q12 per GI (Ordered for 6PM 01/05 and 6AM 01/06)  --F/U GI recommendations, plan for EGD and colonoscopy today 01/06 with GI; F/U results and GI recs     #? Cellulitis   --CTA with --Focal subcutaneous fat stranding along the upper right anterior abdominal wall; correlate for cellulitis.  --Upon exam, non erythematous, edematous  --Continue to monitor patient clinically closely  --Serial abdominal examinations     #COVID-19  --CT head ordered--NO acute bleed or mass.     --Presently NOT requiring supplemental O2 to warrant Decadron.     --Patient is currently saturating 98% on room air  --Discussed with ID attending Dr. Cintron, patient is not in need of Dexamethasone   --Per ID, can continue with Remdesivir for a total of 5 days (Started on 12/28) -- monitor renal function and LFTs   --Monitor inflammatory markers   --Continue with Symbicort 160  --Monitor patient clinically O2 saturation, vital signs and temperature curve       #Elevated D-Dimer  --D-Dimer noted to be 1326 12/31 from 341 12/28  --CT Angio ordered to R/O PE, negative for PE  --VA Duplex LE ordered to R/O DVT--negative for DVT    --Will start patient on full dose Lovenox 75mg BID until imaging results-- Has been stopped 01/02 S/P RRT due to concern for GIB  --Continue to trend D-Dimer       #Hyponatremia.   --CT head   --Continue with Fluid restriction; Monitor Na+ and daily weights.    --Renal evaluation, F/U Recommendations  --Started on Salt tab (Sodium chloride) per renal   --Continue to hold aldactone  --Cymbalta, and Seroquel can be resumed per nephrology; monitor QTC   --Monitor BMP daily -- Na noted to be 130 12/31; F/U nephrology recommendations   --Likely due to SIADH; S/P Tolvaptan 12/30, Salt tabs have been resumed.  -- repeat Urine Na and Osmolality, monitor Serum Na on AM labs -- per nephrology  -- Na noted to be 139       #CAD (coronary artery disease).   --Upgraded to telemetry to exclude cardiac equivalent.  --Continue with Aspirin   --Continue with Simvastatin   --Cardio consult PRN   --Check ECHO -- EF of 65%       #Type 2 diabetes mellitus.   --Continue with ISS for now  --Patient was on bedtime Lantus at home  --Continue to monitor FS and serum glucose levels  --Avoid Hypo/Hyperglycemia  --A1C -- 7.1  --Patient will need outpatient follow up for DM management and control   --Endocrinology consultation PRN   --Glucose levels noted to be elevated, Lantus increased to 10 units at bedtime, continue to  monitor Glucose levels and adjust accordingly  --patient was on 36 units at home, however patient may be scoped by GI, an is on clear diet for now, will continue to monitor and adjust accordingly       #Productive cough  --Patient reporting productive cough with white sputum  --Sputum culture-- Moderate polymorphonuclear leukocytes per low power field, Few Squamous epithelial cells per low power field, Numerous Gram Positive Cocci in Pairs and Chains seen per oil power field  --Throat culture --Negative   --Tessalon perle for cough  --Cough syrup PRN for cough   --Per patient, cough and sore throat have been improving       #Sore throat  --Patient endorsing sore throat  --Throat culture-- negative   --Tylenol for pain control   --Per patient, cough and sore throat have been improving       #HTN  --Monitor Vital Signs and BP closely  --Continue with home medication Lopressor for now  --Aldactone is on hold as may cause HypoNa  --Monitor BP   --Check Orthostatics  --Cardiology consult placed-- F/U recommendations       #Loose bowels  --Per patient experienced loose bowel outpatient   --If occurs and of watery consistency check for C.Diff   --Likely due to COVID  --Blood culture X 2 with No growth   --Urine culture with probable contamination and patient is currently asymptomatic  --Continue to monitor       #Depression  --Per nephrology can resume home medications-- Seroquel and Cymbalta that were previously on hold due to hyponatremia   --Continue to monitor patient and monitor QTC   --Psych consult PRN       #Hypothyroidism   --TSH on this admission-- WNL at 2.78  --Continue with home dose Synthroid       #Bradycardia   --Patient with bradycardia on tele   --At time of visit, patient was asymptomatic   --Continue to monitor on Tele and close monitoring of patient     #Need for prophylactic measure.   -- DVT prophylaxis currently on hold   --Per GI, ASA is currently on hold as well    --Patient will need outpatient follow up for CT Head findings       ASA to be resumed once able to and cleared by GI

## 2022-01-06 NOTE — DISCHARGE NOTE PROVIDER - NSDCMRMEDTOKEN_GEN_ALL_CORE_FT
aspirin 81 mg oral delayed release tablet: 1 tab(s) orally once a day  Cymbalta 20 mg oral delayed release capsule:   dilTIAZem 300 mg/24 hours oral capsule, extended release: 1 cap(s) orally once a day  Januvia 100 mg oral tablet: 1 tab(s) orally once a day  Levemir 100 units/mL subcutaneous solution: 36 unit(s) subcutaneous once a day (at bedtime)  levocetirizine 5 mg oral tablet: 1 tab(s) orally once a day (in the evening)  levothyroxine 25 mcg (0.025 mg) oral tablet: 1 tab(s) orally once a day  Lopressor 100 mg oral tablet: 1 tab(s) orally 2 times a day  montelukast 10 mg oral tablet: 1 tab(s) orally once a day  Multiple Vitamins oral capsule: 1 cap(s) orally once a day  SEROquel 25 mg oral tablet: orally once a day (at bedtime)  simvastatin 20 mg oral tablet: 1 tab(s) orally once a day (at bedtime)  Sodium Chloride 1 g oral tablet: orally 3 times a day  spironolactone 25 mg oral tablet: 1 tab(s) orally once a day  Symbicort 160 mcg-4.5 mcg/inh inhalation aerosol: 2 puff(s) inhaled 2 times a day  tamsulosin 0.4 mg oral capsule: 1 cap(s) orally once a day  Vascepa 1 g oral capsule: 2 cap(s) orally 2 times a day   aspirin 81 mg oral delayed release tablet: 1 tab(s) orally once a day  ***Hold for now, resume on Sunday ( 1/9)  benzonatate 100 mg oral capsule: 1 cap(s) orally every 8 hours, As needed, Cough  Cymbalta 20 mg oral delayed release capsule:   dilTIAZem 300 mg/24 hours oral capsule, extended release: 1 cap(s) orally once a day  Januvia 100 mg oral tablet: 1 tab(s) orally once a day  Levemir 100 units/mL subcutaneous solution: 30 unit(s) subcutaneous once a day (at bedtime)  levothyroxine 25 mcg (0.025 mg) oral tablet: 1 tab(s) orally once a day  Lopressor 100 mg oral tablet: 1 tab(s) orally 2 times a day  montelukast 10 mg oral tablet: 1 tab(s) orally once a day  Multiple Vitamins oral capsule: 1 cap(s) orally once a day  Protonix 40 mg oral delayed release tablet: 1 tab(s) orally 2 times a day   SEROquel 25 mg oral tablet: orally once a day (at bedtime)  simvastatin 20 mg oral tablet: 1 tab(s) orally once a day (at bedtime)  Symbicort 160 mcg-4.5 mcg/inh inhalation aerosol: 2 puff(s) inhaled 2 times a day  tamsulosin 0.4 mg oral capsule: 1 cap(s) orally once a day  Vascepa 1 g oral capsule: 2 cap(s) orally 2 times a day

## 2022-01-06 NOTE — DISCHARGE NOTE NURSING/CASE MANAGEMENT/SOCIAL WORK - PATIENT PORTAL LINK FT
You can access the FollowMyHealth Patient Portal offered by [facility] by registering at the following website: [email or web address]. By joining [employer]’s FollowMyHealth portal, you will also be able to view your health information using other applications (apps) compatible with our system.

## 2022-01-06 NOTE — PROVIDER CONTACT NOTE (OTHER) - SITUATION
notified by tele tech pt sustaining 50 on tele SB
Patient diagnosed with Covid >10 days ago (+ 12/27). Asymptomatic. No further isolation required.
AMS, lethargy, weakness
RN questioning if 10 units of lantus should be given if FS was 98 and pt is NPO.

## 2022-01-06 NOTE — PROGRESS NOTE ADULT - ASSESSMENT
EKG SR LVH    Echo < from: Transthoracic Echocardiogram (12.28.21 @ 07:52) >  Mitral Valve: Mitral annular calcification.  Aortic Valve/Aorta: Calcified aortic valve with normal  opening. Mild aortic regurgitation.  Normal aortic root.  Left Atrium: Normal left atrium.  Left Ventricle: Normal left ventricular internal dimensions  and wall thicknesses.  Normal left ventricular systolic function. No segmental  wall motion abnormalities.  Impaired LV-relaxation with normal filling pressure.  Right Heart: Normal right atrium. Normal right ventricular  size andsystolic function.  Normal tricuspid valve. Normal pulmonic valve.  Pericardium/Pleura: Normal pericardium with no pericardial  effusion.  Hemodynamic: Estimated right atrial pressure is normal.  No evidence of pulmonary hypertension.    < end of copied text >      Assessment and Plan     1)  COVID 19 t/t per primary team , lovenox and asa held 2/2 GI bleed     2)  CAD : denies CP , SOB, asa held 2/2 GI bleed echo normal LV     3)  GI bleed GI on board f/u recs f/u EGD colonoscopy

## 2022-01-06 NOTE — PRE PROCEDURE NOTE - PRE PROCEDURE EVALUATION
Attending Physician:    Dr. Johnson                        Procedure:   Colonoscopy    Indication for Procedure:   Melena  ________________________________________________________  PAST MEDICAL & SURGICAL HISTORY:    Hypertension  Hyperlipidemia  Diabetes type 2  CAD (coronary artery disease) cardiac stents x 7  Stented coronary artery  Depression  Thyroid disease  Benign hypertrophy of prostate  Lumbar disc disorder  MI (myocardial infarction) 12/2014  Diverticulitis  OA (osteoarthritis)  Lumbar radiculopathy  2019 novel coronavirus disease (COVID-19)    S/P knee replacement, bilateral  S/P shoulder surgery  s/p R rotator cuff surgery  History of cardiac catheterization 2010 2 stents, 2012 3 stents, 12/2014 2 coronary artery stents  History of back surgery lumbar  H/O heart artery stent  S/P laminectomy with spinal fusion x 2      ALLERGIES:  No Known Allergies    HOME MEDICATIONS:  aspirin 81 mg oral delayed release tablet: 1 tab(s) orally once a day  Cymbalta 20 mg oral delayed release capsule:   dilTIAZem 300 mg/24 hours oral capsule, extended release: 1 cap(s) orally once a day  Januvia 100 mg oral tablet: 1 tab(s) orally once a day  Levemir 100 units/mL subcutaneous solution: 36 unit(s) subcutaneous once a day (at bedtime)  levocetirizine 5 mg oral tablet: 1 tab(s) orally once a day (in the evening)  levothyroxine 25 mcg (0.025 mg) oral tablet: 1 tab(s) orally once a day  Lopressor 100 mg oral tablet: 1 tab(s) orally 2 times a day  montelukast 10 mg oral tablet: 1 tab(s) orally once a day  Multiple Vitamins oral capsule: 1 cap(s) orally once a day  SEROquel 25 mg oral tablet: orally once a day (at bedtime)  simvastatin 20 mg oral tablet: 1 tab(s) orally once a day (at bedtime)  Sodium Chloride 1 g oral tablet: orally 3 times a day  spironolactone 25 mg oral tablet: 1 tab(s) orally once a day  Symbicort 160 mcg-4.5 mcg/inh inhalation aerosol: 2 puff(s) inhaled 2 times a day  tamsulosin 0.4 mg oral capsule: 1 cap(s) orally once a day  Vascepa 1 g oral capsule: 2 cap(s) orally 2 times a day    AICD/PPM: [ ] yes   [X ] no    PERTINENT LAB DATA:                        9.8    5.26  )-----------( 287      ( 06 Jan 2022 06:31 )             29.5     01-06    139  |  108  |  16  ----------------------------<  133<H>  3.7   |  18<L>  |  1.14    Ca    8.9      06 Jan 2022 06:31  Mg     1.7     01-05    PHYSICAL EXAMINATION:    Height (cm): 165.1  Weight (kg): 74.8  BMI (kg/m2): 27.4  BSA (m2): 1.82T(C): 36  HR: 74  BP: 135/66  RR: 20  SpO2: 100%    Constitutional: NAD    Neck:  No JVD  Respiratory: CTAB/L  Cardiovascular: S1 and S2  Gastrointestinal: BS+, soft, NT/ND  Extremities: No peripheral edema  Neurological: A/O x 4      COMMENTS:    The patient is a suitable candidate for the planned procedure unless box checked [ ]  No, explain:

## 2022-01-06 NOTE — DISCHARGE NOTE PROVIDER - CARE PROVIDER_API CALL
Miah Johnson (DO)  Gastroenterology; Internal Medicine  [address] [place] [address]  Phone: [phone]  Fax: [phone]  Follow Up Time: 2 weeks

## 2022-01-06 NOTE — DISCHARGE NOTE PROVIDER - NSDCCPCAREPLAN_GEN_ALL_CORE_FT
PRINCIPAL DISCHARGE DIAGNOSIS  Diagnosis: Anemia due to GI blood loss  Assessment and Plan of Treatment: RRT called on 1/2 for AMS and weakness following a dark BM.   s/p 2 units of PRBC, evaluated by GI. s/p EGD and colonoscopy   EGD showed Normal esophagus, Gastritis. Biopsied, Multiple non-bleeding duodenal ulcers with no stigmata of bleeding  Colonoscopy showed One 4 mm polyp in the sigmoid colon, removed with a cold snare. Resected and retrieved. Diverticulosis in the sigmoid colon and in the descending colon, Internal hemorrhoids. repeat colonoscopy in 5 years recommended.  Please continue Protonix 40 mg X2 /day   Hold Aspirin for now, resume on Sunday ( 1/9/2022)  Please follow up with gastroenterologist in 2-3 weeks   Please follow up with your primray care physician in one week         SECONDARY DISCHARGE DIAGNOSES  Diagnosis: Hyponatremia  Assessment and Plan of Treatment: hyponatremic. Hypotonic hyponatremia secondary to SIADH likely exacerbated by excessive fluid intake.   Serum Na improved s/p tolvaptan on 12/30.   Discontinue sodium chloride 1 gram TID but fluid restriction 1L contined. Serum Na closely monitored. resolved    Diagnosis: 2019 novel coronavirus disease (COVID-19)  Assessment and Plan of Treatment: You were treated with Remdesivir (antiviral) for 5 days   Not requiring oxygen   Continue with supportive care, take cough medicine as needed, Tylenol for pain or fevers, and drink plenty of fluids to keep hydrated. Follow up with your medical doctor within 1 weeks of discharge.   Continue to quarantine for 10 days from your initial positive test, wash your hands frequently, and wear your mask around others. If you experience worsening shortness of breath, difficulty breathing, chest pain, lower extremity pain or swelling, coughing up blood, or persistent fevers, contact your medical doctor or return to the emergency room.       Diagnosis: CAD (coronary artery disease)  Assessment and Plan of Treatment: Hold Aspirin for now, may resume on Sunday 1/9/22  Continue statin as directed   Please follow up with your cardiologist    Diagnosis: Type 2 diabetes mellitus  Assessment and Plan of Treatment: HgA1C this admission 7.1  Make sure you get your HgA1c checked every three months.  If you take oral diabetes medications, check your blood glucose two times a day.  If you take insulin, check your blood glucose before meals and at bedtime.  It's important not to skip any meals.  Keep a log of your blood glucose results and always take it with you to your doctor appointments.  Keep a list of your current medications including injectables and over the counter medications and bring this medication list with you to all your doctor appointments.  If you have not seen your ophthalmologist this year call for appointment.  Check your feet daily for redness, sores, or openings. Do not self treat. If no improvement in two days call your primary care physician for an appointment.  Low blood sugar (hypoglycemia) is a blood sugar below 70mg/dl. Check your blood sugar if you feel signs/symptoms of hypoglycemia. If your blood sugar is below 70 take 15 grams of carbohydrates (ex 4 oz of apple juice, 3-4 glucose tablets, or 4-6 oz of regular soda) wait 15 minutes and repeat blood sugar to make sure it comes up above 70.  If your blood sugar is above 70 and you are due for a meal, have a meal.  If you are not due for a meal have a snack.  This snack helps keeps your blood sugar at a safe range.

## 2022-01-06 NOTE — PROVIDER CONTACT NOTE (OTHER) - ACTION/TREATMENT ORDERED:
see RRT sheet
NP aware will continue to monitor on tele
Provider states give half of dose. 5units of lantus given.

## 2022-01-06 NOTE — PROVIDER CONTACT NOTE (OTHER) - ASSESSMENT
FS was 98 and pt is NPO.
bp 126/75 pt sleeping, alert and arousable to voice, no c/o chest pain
Patient ax0x4 at baseline, VSS, dark stool noted.

## 2022-01-06 NOTE — PROGRESS NOTE ADULT - SUBJECTIVE AND OBJECTIVE BOX
Santosh Granados MD  Interventional Cardiology / Advance Heart Failure and Cardiac Transplant Specialist  [place] Office : [address] [place] [address] [place]. [address]  Tel:   [place] Office : [address] [facility] [place] [address]  Tel: [phone]  Cell : [phone]    Pt is lying in bed comfortable not in distress, no chest pains no SOB no palpitations for EGD colonoscopy   	  MEDICATIONS:  diltiazem    milliGRAM(s) Oral daily  metoprolol tartrate 100 milliGRAM(s) Oral two times a day  tamsulosin 0.4 milliGRAM(s) Oral at bedtime      benzonatate 100 milliGRAM(s) Oral every 8 hours PRN  budesonide 160 MICROgram(s)/formoterol 4.5 MICROgram(s) Inhaler 2 Puff(s) Inhalation two times a day  hydrocodone/homatropine Syrup 5 milliLiter(s) Oral every 6 hours PRN    acetaminophen     Tablet .. 650 milliGRAM(s) Oral every 4 hours PRN  DULoxetine 20 milliGRAM(s) Oral daily  QUEtiapine 25 milliGRAM(s) Oral at bedtime    pantoprazole  Injectable 40 milliGRAM(s) IV Push two times a day    dextrose 40% Gel 15 Gram(s) Oral once  dextrose 50% Injectable 25 Gram(s) IV Push once  dextrose 50% Injectable 12.5 Gram(s) IV Push once  dextrose 50% Injectable 25 Gram(s) IV Push once  glucagon  Injectable 1 milliGRAM(s) IntraMuscular once  insulin glargine Injectable (LANTUS) 10 Unit(s) SubCutaneous at bedtime  insulin lispro (ADMELOG) corrective regimen sliding scale   SubCutaneous every 6 hours  insulin lispro (ADMELOG) corrective regimen sliding scale   SubCutaneous at bedtime  insulin lispro Injectable (ADMELOG) 2 Unit(s) SubCutaneous three times a day before meals  levothyroxine 25 MICROGram(s) Oral daily  simvastatin 20 milliGRAM(s) Oral at bedtime    dextrose 5%. 1000 milliLiter(s) IV Continuous <Continuous>  dextrose 5%. 1000 milliLiter(s) IV Continuous <Continuous>  sodium chloride 0.9%. 500 milliLiter(s) IV Continuous <Continuous>      PAST MEDICAL/SURGICAL HISTORY  PAST MEDICAL & SURGICAL HISTORY:  Hypertension    Hyperlipidemia    Diabetes  type 2    CAD (coronary artery disease)  cardiac stents x7    Stented coronary artery    Depression    Thyroid disease    Benign hypertrophy of prostate    Lumbar disc disorder    MI (myocardial infarction)  12/2014    Diverticulitis    OA (osteoarthritis)    Lumbar radiculopathy    2019 novel coronavirus disease (COVID-19)    S/P knee replacement, bilateral    S/P shoulder surgery  s/p R rotator cuff surgery    History of cardiac catheterization  2010 2 stents, 2012 3 stents, 12/2014 2 coronary artery stents    History of back surgery  lumbar    H/O heart artery stent    S/P laminectomy with spinal fusion  x 2        SOCIAL HISTORY: Substance Use (street drugs): ( x ) never used  (  ) other:    FAMILY HISTORY:  Family history of heart disease (Sibling)              PHYSICAL EXAM:  T(C): 36 (01-06-22 @ 12:55), Max: 37 (01-06-22 @ 10:34)  HR: 70 (01-06-22 @ 14:10) (65 - 75)  BP: 137/66 (01-06-22 @ 14:10) (106/53 - 160/75)  RR: 14 (01-06-22 @ 14:10) (14 - 20)  SpO2: 100% (01-06-22 @ 14:10) (99% - 100%)  Wt(kg): --  I&O's Summary    05 Jan 2022 07:01  -  06 Jan 2022 07:00  --------------------------------------------------------  IN: 1360 mL / OUT: 750 mL / NET: 610 mL      Height (cm): 165.1 (01-06 @ 12:47)  Weight (kg): 74.8 (01-06 @ 12:47)  BMI (kg/m2): 27.4 (01-06 @ 12:47)  BSA (m2): 1.82 (01-06 @ 12:47)       EYES:   PERRLA   ENMT:   Moist mucous membranes, Good dentition, No lesions  Cardiovascular: Normal S1 S2, No JVD, No murmurs, No edema  Respiratory: Lungs clear to auscultation	  Gastrointestinal:  Soft, Non-tender, + BS	  Extremities: no edema                                    9.8    5.26  )-----------( 287      ( 06 Jan 2022 06:31 )             29.5     01-06    139  |  108  |  16  ----------------------------<  133<H>  3.7   |  18<L>  |  1.14    Ca    8.9      06 Jan 2022 06:31  Mg     1.7     01-05      proBNP:   Lipid Profile:   HgA1c:   TSH:     Consultant(s) Notes Reviewed:  [x ] YES  [ ] NO    Care Discussed with Consultants/Other Providers [ x] YES  [ ] NO    Imaging Personally Reviewed independently:  [x] YES  [ ] NO    All labs, radiologic studies, vitals, orders and medications list reviewed. Patient is seen and examined at bedside. Case discussed with medical team.

## 2022-01-06 NOTE — PROVIDER CONTACT NOTE (OTHER) - REASON
AMS, lethargy, weakness
Discontinuation of Isolation for COVID patient
10units of lantus order even though NPO.
hr 50 on tele

## 2022-01-06 NOTE — PROGRESS NOTE ADULT - ASSESSMENT
76y Male with history of HTN, DM, depression presents with confusion. Nephrology consulted for hyponatremia.    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 Serum Na improved s/p tolvaptan on 12/30. Discontinue sodium chloride 1 gram TID but continue with 1L FR. Monitor serum Na.    2) HTN: BP controlled. Continue with current medications. Monitor BP.    3) LE edema: Patient appears euvolemic and TTE unremarkable. Unclear why patient on aldactone. Would continue to hold for now. Monitor UO.    4) Depression: On cymbalta and seroquel. Both medications have been reported to cause hyponatremia however frequency is generally low. No renal objection to continue medications as needed for depression.    [facility] NEPHROLOGY  Tr Begum M.D.  Kelechi Chaudhry D.O.  Mariam Bell M.D.  Radha Burnett, DIANA, ANP-C    Telephone: [phone]  Facsimile: [phone]    [address] [place] [address]

## 2022-01-06 NOTE — DISCHARGE NOTE PROVIDER - HOSPITAL COURSE
Patient is a 75 y/o M with  PMHx of hypothyroidism on Synthroid, essential HTN on Lopressor, aldactone, depression on Seroquel and Cymbalta, CAD with S/P cardiac catheterization by Dr. Daryl Patrick at [place] in Nov 2019, type 2 DM on Januvia and bedtime Levemir, with the patient followed by his PCP above and apparently has had upper nasal congestion and loose BM for the past 3 weeks, with the son concerned with increased confusion and decreased ADL and exertional dyspnoea for the past 3 weeks, but worsening for the past few days, with the patient with an apparent outpatient lab assay with hyponatremia and was sent to the ER by his PCP, apparently with prior episodes of hyponatremia, under the presumption of the hyponatremia as the primary mechanism of the confusional state.  Patient had just received his booster dose of the COVID-10 Moderna 7 days ago.   CT of head showed Abnormal area of low-attenuation involving the right centrum semiovale region. Age-indeterminate lacunar infarct is seen involving the left thalamus.  He was also hyponatremic. Hypotonic hyponatremia secondary to SIADH likely exacerbated by excessive fluid intake. Serum Na improved s/p tolvaptan on 12/30. Discontinue sodium chloride 1 gram TID but continue with 1L FR. Serum Na closely monitored. resolved  Found to be COVID positive. s/p Remdesivir X 5 days. Not requiring Oxygen. No need for Dexamethasone.   CTA of chest showed no PE, clear lungs, circumferential wall thiecening with periduodenal fat stranding may represent enteritis. foal subcutaneous fat stranding along the upper right anterior abdominal   wall; correlate for cellulitis. On exam, non erythematous. Monitored off antibiotic   Hx of CAD, Aspirin on hold d/t GI bleed. Evaluated by GI.   Hospital course complicated by GI bleed. RRT called on 1/2 for AMS and weakness following a dark BM. s/p 2 units of PRBC, evaluated by GI. s/p EGD and colonoscopy   EGD showed Normal esophagus, Gastritis. Biopsied, Multiple non-bleeding duodenal ulcers with no stigmata of bleeding  Colonoscopy showed One 4 mm polyp in the sigmoid colon, removed with a cold snare. Resected and retrieved. Diverticulosis in the sigmoid colon and in the descending colon, Internal hemorrhoids.   Patient is a 75 y/o M with  PMHx of hypothyroidism on Synthroid, essential HTN on Lopressor, aldactone, depression on Seroquel and Cymbalta, CAD with S/P cardiac catheterization by Dr. Daryl Patrick at [place] in Nov 2019, type 2 DM on Januvia and bedtime Levemir, with the patient followed by his PCP above and apparently has had upper nasal congestion and loose BM for the past 3 weeks, with the son concerned with increased confusion and decreased ADL and exertional dyspnoea for the past 3 weeks, but worsening for the past few days, with the patient with an apparent outpatient lab assay with hyponatremia and was sent to the ER by his PCP, apparently with prior episodes of hyponatremia, under the presumption of the hyponatremia as the primary mechanism of the confusional state.  Patient had just received his booster dose of the COVID-10 Moderna 7 days ago.   CT of head showed Abnormal area of low-attenuation involving the right centrum semiovale region. Age-indeterminate lacunar infarct is seen involving the left thalamus.  He was also hyponatremic. Hypotonic hyponatremia secondary to SIADH likely exacerbated by excessive fluid intake. Serum Na improved s/p tolvaptan on 12/30. Discontinue sodium chloride 1 gram TID but continue with 1L FR. Serum Na closely monitored. resolved  Found to be COVID positive. s/p Remdesivir X 5 days. Not requiring Oxygen. No need for Dexamethasone.   CTA of chest showed no PE, clear lungs, circumferential wall thiecening with periduodenal fat stranding may represent enteritis. foal subcutaneous fat stranding along the upper right anterior abdominal   wall; correlate for cellulitis. On exam, non erythematous. Monitored off antibiotic   Hx of CAD, Aspirin on hold d/t GI bleed. Evaluated by GI.   Hospital course complicated by GI bleed. RRT called on 1/2 for AMS and weakness following a dark BM. s/p 2 units of PRBC, evaluated by GI. s/p EGD and colonoscopy   EGD showed Normal esophagus, Gastritis. Biopsied, Multiple non-bleeding duodenal ulcers with no stigmata of bleeding  Colonoscopy showed One 4 mm polyp in the sigmoid colon, removed with a cold snare. Resected and retrieved. Diverticulosis in the sigmoid colon and in the descending colon, Internal hemorrhoids.

## 2022-01-06 NOTE — PRE-ANESTHESIA EVALUATION ADULT - NSANTHPMHFT_GEN_ALL_CORE
76M HTN,HLD, CAD s/p stents, hypothyroid, DM,   ambulates with cane  Covid + 12/27/21 has been on 10 days isolation  Denies CP/SOB

## 2022-01-06 NOTE — PROGRESS NOTE ADULT - SUBJECTIVE AND OBJECTIVE BOX
[facility] NEPHROLOGY- PROGRESS NOTE    76y Male with history of HTN, DM, depression presents with confusion. Nephrology consulted for hyponatremia.    REVIEW OF SYSTEMS:  Gen: no changes in weight  Cards: no chest pain  Resp: no dyspnea  GI: no nausea or vomiting or diarrhea  Vascular: no LE edema    No Known Allergies      Hospital Medications: Medications reviewed      VITALS:  T(F): 96.8 (01-06-22 @ 12:55), Max: 98.6 (01-06-22 @ 10:34)  HR: 74 (01-06-22 @ 12:55)  BP: 135/66 (01-06-22 @ 12:55)  RR: 20 (01-06-22 @ 12:55)  SpO2: 100% (01-06-22 @ 12:55)  Wt(kg): --    01-05 @ 07:01  -  01-06 @ 07:00  --------------------------------------------------------  IN: 1360 mL / OUT: 750 mL / NET: 610 mL      Height (cm): 165.1 (01-06 @ 12:47)  Weight (kg): 74.8 (01-06 @ 12:47)  BMI (kg/m2): 27.4 (01-06 @ 12:47)  BSA (m2): 1.82 (01-06 @ 12:47)        PHYSICAL EXAM:    Gen: NAD, calm  Cards: RRR, +S1/S2, no M/G/R  Resp: course BS B/L  GI: soft, NT/ND, NABS  Vascular: no LE edema B/L      LABS:  01-06    139  |  108  |  16  ----------------------------<  133<H>  3.7   |  18<L>  |  1.14    Ca    8.9      06 Jan 2022 06:31  Mg     1.7     01-05      Creatinine Trend: 1.14 <--, 1.21 <--, 1.21 <--, 1.30 <--, 1.22 <--, 1.24 <--, 1.24 <--, 1.08 <--, 1.05 <--, 1.02 <--                        9.8    5.26  )-----------( 287      ( 06 Jan 2022 06:31 )             29.5     Urine Studies:    Osmolality, Random Urine: 572 mos/kg (12-30 @ 15:00)

## 2022-01-06 NOTE — PROGRESS NOTE ADULT - PROVIDER SPECIALTY LIST ADULT
Gastroenterology
Infectious Disease
Infectious Disease
Internal Medicine
Internal Medicine
Cardiology
Internal Medicine
Nephrology
Cardiology
Gastroenterology
Gastroenterology
Internal Medicine
Nephrology
Cardiology
Internal Medicine
Nephrology
Cardiology

## 2022-01-06 NOTE — PRE-ANESTHESIA EVALUATION ADULT - NSANTHPEFT_GEN_ALL_CORE
GENERAL: NAD  HEAD:  Atraumatic, Normocephalic  CHEST/LUNG: Clear to auscultation bilaterally  HEART: Normal S1/S2  PSYCH: AAOx3

## 2022-03-04 NOTE — H&P PST ADULT - NS PRO TALK SOMEONE YN
A catheter was exchanged for a ([facility] - Catheter 6fr Jr4 Classic Crv 100cm Radopq Braid) catheter.  no

## 2023-04-05 NOTE — PROGRESS NOTE ADULT - SUBJECTIVE AND OBJECTIVE BOX
Name of Patient : JERRI SIDHU  MRN: [id number]  Date of visit: 01-01-22       Subjective: Patient seen and examined. No new events except as noted.     REVIEW OF SYSTEMS:    CONSTITUTIONAL: No weakness, fevers or chills  EYES/ENT: No visual changes;  No vertigo or throat pain   NECK: No pain or stiffness  RESPIRATORY: No cough, wheezing, hemoptysis; No shortness of breath  CARDIOVASCULAR: No chest pain or palpitations  GASTROINTESTINAL: No abdominal or epigastric pain. No nausea, vomiting, or hematemesis; No diarrhea or constipation. No melena or hematochezia.  GENITOURINARY: No dysuria, frequency or hematuria  NEUROLOGICAL: No numbness or weakness  SKIN: No itching, burning, rashes, or lesions   All other review of systems is negative unless indicated above.    MEDICATIONS:  MEDICATIONS  (STANDING):  aspirin enteric coated 81 milliGRAM(s) Oral daily  budesonide 160 MICROgram(s)/formoterol 4.5 MICROgram(s) Inhaler 2 Puff(s) Inhalation two times a day  dextrose 40% Gel 15 Gram(s) Oral once  dextrose 5%. 1000 milliLiter(s) (50 mL/Hr) IV Continuous <Continuous>  dextrose 5%. 1000 milliLiter(s) (100 mL/Hr) IV Continuous <Continuous>  dextrose 50% Injectable 25 Gram(s) IV Push once  dextrose 50% Injectable 12.5 Gram(s) IV Push once  dextrose 50% Injectable 25 Gram(s) IV Push once  diltiazem    milliGRAM(s) Oral daily  DULoxetine 20 milliGRAM(s) Oral daily  enoxaparin Injectable 70 milliGRAM(s) SubCutaneous two times a day  glucagon  Injectable 1 milliGRAM(s) IntraMuscular once  insulin glargine Injectable (LANTUS) 5 Unit(s) SubCutaneous at bedtime  insulin lispro (ADMELOG) corrective regimen sliding scale   SubCutaneous three times a day before meals  insulin lispro (ADMELOG) corrective regimen sliding scale   SubCutaneous at bedtime  levothyroxine 25 MICROGram(s) Oral daily  metoprolol tartrate 100 milliGRAM(s) Oral two times a day  QUEtiapine 25 milliGRAM(s) Oral at bedtime  simvastatin 20 milliGRAM(s) Oral at bedtime  sodium chloride 1 Gram(s) Oral three times a day  tamsulosin 0.4 milliGRAM(s) Oral at bedtime      PHYSICAL EXAM:  T(C): 36.8 (01-01-22 @ 20:08), Max: 36.9 (01-01-22 @ 17:18)  HR: 74 (01-01-22 @ 20:08) (59 - 87)  BP: 111/65 (01-01-22 @ 20:08) (109/69 - 135/63)  RR: 18 (01-01-22 @ 20:08) (18 - 18)  SpO2: 96% (01-01-22 @ 20:08) (96% - 99%)  Wt(kg): --  I&O's Summary    31 Dec 2021 07:01  -  01 Jan 2022 07:00  --------------------------------------------------------  IN: 50 mL / OUT: 0 mL / NET: 50 mL    01 Jan 2022 07:01  -  01 Jan 2022 23:55  --------------------------------------------------------  IN: 560 mL / OUT: 0 mL / NET: 560 mL          Appearance: Normal	  HEENT:  PERRLA   Lymphatic: No lymphadenopathy   Cardiovascular: Normal S1 S2, no JVD  Respiratory: normal effort , clear  Gastrointestinal:  Soft, Non-tender  Skin: No rashes,  warm to touch  Psychiatry:  Mood & affect appropriate  Musculuskeletal: No edema      All labs, Imaging and EKGs personally reviewed       12-31-21 @ 07:01  -  01-01-22 @ 07:00  --------------------------------------------------------  IN: 50 mL / OUT: 0 mL / NET: 50 mL    01-01-22 @ 07:01  -  01-01-22 @ 23:55  --------------------------------------------------------  IN: 560 mL / OUT: 0 mL / NET: 560 mL                          9.9    5.81  )-----------( 273      ( 01 Jan 2022 07:10 )             29.7               01-01    131<L>  |  102  |  25<H>  ----------------------------<  216<H>  4.9   |  18<L>  |  1.08    Ca    8.7      01 Jan 2022 07:17    TPro  6.0  /  Alb  3.2<L>  /  TBili  0.1<L>  /  DBili  <0.1  /  AST  17  /  ALT  16  /  AlkPhos  57  01-01    PT/INR - ( 31 Dec 2021 06:04 )   PT: 14.4 sec;   INR: 1.21 ratio         < from: CT Angio Chest PE Protocol w/ IV Cont (12.31.21 @ 21:57) >  IMPRESSION:  No pulmonary embolism. Clear lungs.    Circumferential wall thickening with periduodenal fat stranding may   represent enteritis.    Focal subcutaneous fat stranding along the upper right anterior abdominal   wall; correlate for cellulitis.                             Tranexamic Acid Pregnancy And Lactation Text: It is unknown if this medication is safe during pregnancy or breast feeding.

## 2023-11-07 NOTE — PATIENT PROFILE ADULT - PATIENT'S PREFERRED PRONOUN
[facility] Hospitalist Discharge Summary    Roni Rush Patient Status:  Inpatient    1966 MRN [id number]   Location [address] SURG Attending Gregg Osman MD   Hosp Day # 4 PCP Chuy Terrell MD     Date of Admission: 2023    Date of Discharge: 2023  5:08 PM    Admitting Diagnosis: Acute abdominal pain [R10.9]    Discharge Diagnosis: Principal Problem:    Alcohol withdrawal syndrome with complication (CMD)  Active Problems:    Thrombocytopenia (CMD)    Acute abdominal pain    Panic attacks    Chronic alcoholic liver disease (CMD)    History of pulmonary embolism    Gastroesophageal reflux disease with esophagitis    Hypertensive heart disease with chronic diastolic congestive heart failure (CMD)      Reason for Admission:   Chief Complaint   Patient presents with   • Chest Pain   • Alcohol Problem   • Panic Attack       Hospital Course:   Roni Rush is a(n) 56 year old male  has a past medical history of Alcohol abuse, Anxiety, CHF (congestive heart failure) (CMD), Cocaine abuse (CMD), Depression, Falls, Hypertension, Panic attack, Polysubstance abuse (CMD), and Pulmonary embolism (CMD). presenting with      * Alcohol withdrawal syndrome with complication (CMD)  Assessment & Plan  2023  Patient actively withdrawing. Monitor CIWA scores and treat as needed with BZDs. On gabapentin. On thiamine and folate. Continue IVF and monitor.   11/3/2023  Continue to monitor CIWA scores. Continue on IVF.   2023  Blood pressure and tachycardia are better. Still tremulous but improving. Continue to monitor CIWA scores on protocol.   2023  Remains stable. Blood pressure and tachycardia are much improved. Monitor.   2023  CIWA < 8. Stable for [place] home.     Panic attacks  Assessment & Plan  2023  Likely from ETOH withdrawal. Continue CIWA protocol.     Acute abdominal pain  Assessment & Plan  2023  Suspect alcoholic gastritis. Lipase normal. Will start  on IV PPI. Monitor for clinical improvement.   11/3/2023  GI cocktail ordered. Add sucralfate.   11/4/2023  Improving, monitor. Tolerating diet.   11/5/2023  Patient continues to c/o reflux symptoms. Has some tenderness on upper abdominal exam today. Will obtain CT A/P to further evaluate.   11/6/2023  CTAP with findings of gastritis. Continue PPI bid.     Hypertensive heart disease with chronic diastolic congestive heart failure (CMD)  Assessment & Plan  11/6/2023 Diagnosed in past. No signs of heart failure this admission. Continue amlodipine.     Gastroesophageal reflux disease with esophagitis  Assessment & Plan  11/3/2023  Patient c/o reflux symptoms; suspect alcoholic gastritis/esophagitis. GI cocktail x1 ordered. Will also order sucralfate.   11/4/2023  Improved. Continue to monitor.   11/5/2023  Ordered GI cocktail today. Check CT A/P.   11/6/2023  Continue PPI BID.     History of pulmonary embolism  Assessment & Plan  11/2/2023  Not on active AC. Given lack of pulmonary embolism noted on multiple CT scan across several years including latest CT chest in May 2023 would recommend to continue holding anticoagulation. He is at high risk of bleed especially GI or intracranial given his thrombocytopenia, chronic alcohol use and falls as evidenced by his prior imaging with associated fractures.    Chronic alcoholic liver disease (CMD)  Assessment & Plan  11/2/2023  Advised cessation of alcohol.     Thrombocytopenia (CMD)  Assessment & Plan  11/2/2023 plt 103 >> 60 today. Hold blood thinners. Likely due to bone marrow suppression from chronic alcoholism. Monitor.     Hypokalemia-resolved as of 11/5/2023  Assessment & Plan  11/2/2023  Secondary to alcohol. Will replace. Check mag.   11/3/2023  K normal. Will continue to monitor.     Increased anion gap metabolic acidosis-resolved as of 11/3/2023  Assessment & Plan  11/2/2023  Secondary to alcohol. Improving with IV hydration.   11/3/2023  Resolved.          Consultations:   IP CONSULT TO SOCIAL WORK  IP CONSULT TO CHEMICAL DEPENDENCY       Disposition:  Home     Discharge Medications:     Summary of your Discharge Medications      Take these Medications      Details   amLODIPine 5 MG tablet  Commonly known as: NORVASC   Take 1 tablet by mouth daily.     escitalopram 10 MG tablet  Commonly known as: LEXAPRO   Take 1 tablet by mouth daily. Do not start before September 20, 2023.     folic acid 1 MG tablet  Commonly known as: FOLATE   Take 1 tablet by mouth daily. Do not start before September 20, 2023.     pantoprazole 40 MG tablet  Commonly known as: PROTONIX   Take 1 tablet by mouth 2 times daily.     thiamine 100 MG tablet  Commonly known as: VITAMIN B1   Take 1 tablet by mouth daily. Do not start before September 20, 2023.             Outpatient Follow-Up:   APPOINTMENTS  Previously arranged:  No future appointments.     Hospital recommended/arranged:  Chuy Terrell MD  [address] [facility] [id number] [phone]      You will have a post hospital follow up with Ingrid John CNP on Tuesday , November 14, 2023 at 3:00 pm at the Following Clinic [[address]  [phone]] [place] location .Please call the provider's office if you are not able to keep this appointment . Please remember to bring your ID, Insurance Card , Current Medications and Hospital discharge instructions with you to the visit .    Alcoholics Anonymous - [place] Help Line  [address] [facility] [address]  [phone]  Follow up      [facility] - Detoxification Services  [address] [facility] [address]  [phone]        [facility]  [address] [address]  [phone]        [facility]  [address] [facility] [address] [facility] [id number] [phone]        [place]  [facility] [place] [facility] - Food [facility]  [address] [facility] [address]  [phone]          DIAGNOSTICS  No discharge procedures on file.    Diagnosis, treatment plan, and follow-up recommendations were discussed and explained to the patient who verbalized their understanding and agreement. Opportunity was given to ask questions concerning the diagnosis and treatment plan. Advised to seek urgent care upon discharge if worsening symptoms develop prior to scheduled follow-up.    Gregg Osman MD    Time Spent on Discharge: 40 min           Patient Privacy Notice     The 21st Century Cures Act makes medical notes like these available to patients in the interest of transparency. Please be advised that this is a medical document. Medical documents are intended to carry relevant information and the clinical opinion of the practitioner. The medical note is intended as medical provider to provider communication, and may appear blunt or direct. It is written in medical language, and may contain abbreviations or verbiage that are unfamiliar.      Him/He

## 2024-02-07 NOTE — ED PROVIDER NOTE - PROGRESS NOTE ADDITIONAL1
Pt presents for f/u of his left knee pain.  He notes overall he has been doing okay with his knee since he last had cortisone in June of 2022 with Dr Verduzco.  In the last few months though the pain has been slowly getting progressively worse, especially with going from sitting to standing.  Once he is up and moving he does okay.  He is hoping to get a repeat cortisone injection as those seem to help him quite a bit.  He did have viscosupplementation injections back in 2021 with Dr SHAHANA Briceno and feels those helped as well.  Denies any N/T in the leg.  Denies any new injury.  Denies any other new complaints or concerns.  Left knee reveals no deformity, skin intact.  Mild swelling, trace effusion . No erythema or signs of infection.  TTP over the lateral and anterior knee, rather diffuse, remainder is NT.  ROM is near full with notable PF crepitation and pain with patellar compression . No jim laxity. Calf soft, nontender. Pedal pulses palpable. Moves toes well, capillary refill WNL (within normal limits), good sensation to light touch throughout.  Xray of the left knee reveals no acute fracture or dislocations.  Tricompartmental degenerative changes with near bone on bone appearance of the lateral compartment.  Loose bodies are again noted.  Please see radiology report for official reading which is not yet available at the time of this documentation.  Left knee pain, degenerative joint disease and loose bodies  We reviewed his xrays and discussed his arthritis.  He has overall doing well with both viscosupplementation injections and cortisone, with his most recent cortisone injection over 1.5 years ago.  Seems reasonable to repeat that.  With sterile technique the left maye was asp/inj with 2cc marcaine, 2cc lidocaine and 1cc of depomedrol 80mg, he tolerated this well.  Noted mild improvement upon standing from the exam table.  Continue with his current medication as needed for pain.  Ice and elevation as needed.   Continue with his HEP to maintain his motion.  He may call for gel shots if the knee pain continues, otherwise will f/u on an as needed basis.  All questions answered.    Nursing notes reviewed and accepted.    Supervising Physician for this date and note Gene Montanez MD           Additional Progress Note...

## 2024-06-16 ENCOUNTER — EMERGENCY (EMERGENCY)
Facility: HOSPITAL | Age: 79
LOS: 0 days | Discharge: HOME | End: 2024-06-16
Attending: STUDENT IN AN ORGANIZED HEALTH CARE EDUCATION/TRAINING PROGRAM
Payer: MEDICARE

## 2024-06-16 VITALS
TEMPERATURE: 98 F | OXYGEN SATURATION: 97 % | DIASTOLIC BLOOD PRESSURE: 74 MMHG | RESPIRATION RATE: 18 BRPM | SYSTOLIC BLOOD PRESSURE: 126 MMHG | HEART RATE: 84 BPM

## 2024-06-16 VITALS
OXYGEN SATURATION: 96 % | WEIGHT: 169.98 LBS | HEART RATE: 91 BPM | DIASTOLIC BLOOD PRESSURE: 82 MMHG | HEIGHT: 64 IN | SYSTOLIC BLOOD PRESSURE: 142 MMHG | RESPIRATION RATE: 16 BRPM | TEMPERATURE: 98 F

## 2024-06-16 DIAGNOSIS — Z79.4 LONG TERM (CURRENT) USE OF INSULIN: ICD-10-CM

## 2024-06-16 DIAGNOSIS — Z95.5 PRESENCE OF CORONARY ANGIOPLASTY IMPLANT AND GRAFT: Chronic | ICD-10-CM

## 2024-06-16 DIAGNOSIS — I25.10 ATHEROSCLEROTIC HEART DISEASE OF NATIVE CORONARY ARTERY WITHOUT ANGINA PECTORIS: ICD-10-CM

## 2024-06-16 DIAGNOSIS — Z79.84 LONG TERM (CURRENT) USE OF ORAL HYPOGLYCEMIC DRUGS: ICD-10-CM

## 2024-06-16 DIAGNOSIS — Z98.890 OTHER SPECIFIED POSTPROCEDURAL STATES: Chronic | ICD-10-CM

## 2024-06-16 DIAGNOSIS — Z98.89 OTHER SPECIFIED POSTPROCEDURAL STATES: Chronic | ICD-10-CM

## 2024-06-16 DIAGNOSIS — Z79.82 LONG TERM (CURRENT) USE OF ASPIRIN: ICD-10-CM

## 2024-06-16 DIAGNOSIS — R11.2 NAUSEA WITH VOMITING, UNSPECIFIED: ICD-10-CM

## 2024-06-16 DIAGNOSIS — Z95.5 PRESENCE OF CORONARY ANGIOPLASTY IMPLANT AND GRAFT: ICD-10-CM

## 2024-06-16 DIAGNOSIS — R10.9 UNSPECIFIED ABDOMINAL PAIN: ICD-10-CM

## 2024-06-16 DIAGNOSIS — K56.7 ILEUS, UNSPECIFIED: ICD-10-CM

## 2024-06-16 DIAGNOSIS — Z98.1 ARTHRODESIS STATUS: Chronic | ICD-10-CM

## 2024-06-16 DIAGNOSIS — I10 ESSENTIAL (PRIMARY) HYPERTENSION: ICD-10-CM

## 2024-06-16 DIAGNOSIS — E03.9 HYPOTHYROIDISM, UNSPECIFIED: ICD-10-CM

## 2024-06-16 DIAGNOSIS — Z96.653 PRESENCE OF ARTIFICIAL KNEE JOINT, BILATERAL: Chronic | ICD-10-CM

## 2024-06-16 PROBLEM — U07.1 COVID-19: Chronic | Status: ACTIVE | Noted: 2021-12-27

## 2024-06-16 LAB
ALBUMIN SERPL ELPH-MCNC: 3.2 G/DL — LOW (ref 3.3–5)
ALP SERPL-CCNC: 68 U/L — SIGNIFICANT CHANGE UP (ref 40–120)
ALT FLD-CCNC: 24 U/L — SIGNIFICANT CHANGE UP (ref 12–78)
ANION GAP SERPL CALC-SCNC: 8 MMOL/L — SIGNIFICANT CHANGE UP (ref 5–17)
ANION GAP SERPL CALC-SCNC: 9 MMOL/L — SIGNIFICANT CHANGE UP (ref 5–17)
AST SERPL-CCNC: 31 U/L — SIGNIFICANT CHANGE UP (ref 15–37)
BASOPHILS # BLD AUTO: 0 K/UL — SIGNIFICANT CHANGE UP (ref 0–0.2)
BASOPHILS NFR BLD AUTO: 0 % — SIGNIFICANT CHANGE UP (ref 0–2)
BILIRUB SERPL-MCNC: 0.6 MG/DL — SIGNIFICANT CHANGE UP (ref 0.2–1.2)
BUN SERPL-MCNC: 21 MG/DL — SIGNIFICANT CHANGE UP (ref 7–23)
BUN SERPL-MCNC: 22 MG/DL — SIGNIFICANT CHANGE UP (ref 7–23)
CALCIUM SERPL-MCNC: 8.4 MG/DL — LOW (ref 8.5–10.1)
CALCIUM SERPL-MCNC: 8.9 MG/DL — SIGNIFICANT CHANGE UP (ref 8.5–10.1)
CHLORIDE SERPL-SCNC: 94 MMOL/L — LOW (ref 96–108)
CHLORIDE SERPL-SCNC: 96 MMOL/L — SIGNIFICANT CHANGE UP (ref 96–108)
CO2 SERPL-SCNC: 26 MMOL/L — SIGNIFICANT CHANGE UP (ref 22–31)
CO2 SERPL-SCNC: 27 MMOL/L — SIGNIFICANT CHANGE UP (ref 22–31)
CREAT SERPL-MCNC: 1.47 MG/DL — HIGH (ref 0.5–1.3)
CREAT SERPL-MCNC: 1.58 MG/DL — HIGH (ref 0.5–1.3)
EGFR: 45 ML/MIN/1.73M2 — LOW
EGFR: 49 ML/MIN/1.73M2 — LOW
EOSINOPHIL # BLD AUTO: 0.06 K/UL — SIGNIFICANT CHANGE UP (ref 0–0.5)
EOSINOPHIL NFR BLD AUTO: 1 % — SIGNIFICANT CHANGE UP (ref 0–6)
GLUCOSE SERPL-MCNC: 109 MG/DL — HIGH (ref 70–99)
GLUCOSE SERPL-MCNC: 130 MG/DL — HIGH (ref 70–99)
HCT VFR BLD CALC: 39.1 % — SIGNIFICANT CHANGE UP (ref 39–50)
HGB BLD-MCNC: 13.5 G/DL — SIGNIFICANT CHANGE UP (ref 13–17)
LIDOCAIN IGE QN: 30 U/L — SIGNIFICANT CHANGE UP (ref 13–75)
LYMPHOCYTES # BLD AUTO: 0.68 K/UL — LOW (ref 1–3.3)
LYMPHOCYTES # BLD AUTO: 12 % — LOW (ref 13–44)
MANUAL SMEAR VERIFICATION: SIGNIFICANT CHANGE UP
MCHC RBC-ENTMCNC: 33.7 PG — SIGNIFICANT CHANGE UP (ref 27–34)
MCHC RBC-ENTMCNC: 34.5 G/DL — SIGNIFICANT CHANGE UP (ref 32–36)
MCV RBC AUTO: 97.5 FL — SIGNIFICANT CHANGE UP (ref 80–100)
MONOCYTES # BLD AUTO: 0.96 K/UL — HIGH (ref 0–0.9)
MONOCYTES NFR BLD AUTO: 17 % — HIGH (ref 2–14)
NEUTROPHILS # BLD AUTO: 3.74 K/UL — SIGNIFICANT CHANGE UP (ref 1.8–7.4)
NEUTROPHILS NFR BLD AUTO: 66 % — SIGNIFICANT CHANGE UP (ref 43–77)
NRBC # BLD: 0 /100 WBCS — SIGNIFICANT CHANGE UP (ref 0–0)
NRBC # BLD: SIGNIFICANT CHANGE UP /100 WBCS (ref 0–0)
PLAT MORPH BLD: NORMAL — SIGNIFICANT CHANGE UP
PLATELET # BLD AUTO: 231 K/UL — SIGNIFICANT CHANGE UP (ref 150–400)
POTASSIUM SERPL-MCNC: 3.8 MMOL/L — SIGNIFICANT CHANGE UP (ref 3.5–5.3)
POTASSIUM SERPL-MCNC: 4.1 MMOL/L — SIGNIFICANT CHANGE UP (ref 3.5–5.3)
POTASSIUM SERPL-SCNC: 3.8 MMOL/L — SIGNIFICANT CHANGE UP (ref 3.5–5.3)
POTASSIUM SERPL-SCNC: 4.1 MMOL/L — SIGNIFICANT CHANGE UP (ref 3.5–5.3)
PROT SERPL-MCNC: 7.5 GM/DL — SIGNIFICANT CHANGE UP (ref 6–8.3)
RBC # BLD: 4.01 M/UL — LOW (ref 4.2–5.8)
RBC # FLD: 14.1 % — SIGNIFICANT CHANGE UP (ref 10.3–14.5)
RBC BLD AUTO: NORMAL — SIGNIFICANT CHANGE UP
SODIUM SERPL-SCNC: 130 MMOL/L — LOW (ref 135–145)
SODIUM SERPL-SCNC: 130 MMOL/L — LOW (ref 135–145)
VARIANT LYMPHS # BLD: 4 % — SIGNIFICANT CHANGE UP (ref 0–6)
WBC # BLD: 5.67 K/UL — SIGNIFICANT CHANGE UP (ref 3.8–10.5)
WBC # FLD AUTO: 5.67 K/UL — SIGNIFICANT CHANGE UP (ref 3.8–10.5)

## 2024-06-16 PROCEDURE — 74177 CT ABD & PELVIS W/CONTRAST: CPT | Mod: 26,MC

## 2024-06-16 PROCEDURE — 93010 ELECTROCARDIOGRAM REPORT: CPT | Mod: 76

## 2024-06-16 PROCEDURE — 99285 EMERGENCY DEPT VISIT HI MDM: CPT

## 2024-06-16 RX ORDER — TAMSULOSIN HYDROCHLORIDE 0.4 MG/1
1 CAPSULE ORAL
Qty: 0 | Refills: 0 | DISCHARGE

## 2024-06-16 RX ORDER — MONTELUKAST 4 MG/1
1 TABLET, CHEWABLE ORAL
Qty: 0 | Refills: 0 | DISCHARGE

## 2024-06-16 RX ORDER — SODIUM CHLORIDE 9 MG/ML
500 INJECTION INTRAMUSCULAR; INTRAVENOUS; SUBCUTANEOUS ONCE
Refills: 0 | Status: COMPLETED | OUTPATIENT
Start: 2024-06-16 | End: 2024-06-16

## 2024-06-16 RX ORDER — INSULIN DETEMIR 100/ML (3)
36 INSULIN PEN (ML) SUBCUTANEOUS
Qty: 0 | Refills: 0 | DISCHARGE

## 2024-06-16 RX ORDER — METOPROLOL TARTRATE 50 MG
1 TABLET ORAL
Qty: 0 | Refills: 0 | DISCHARGE

## 2024-06-16 RX ORDER — SITAGLIPTIN 50 MG/1
1 TABLET, FILM COATED ORAL
Qty: 0 | Refills: 0 | DISCHARGE

## 2024-06-16 RX ORDER — ACETAMINOPHEN 500 MG
1000 TABLET ORAL ONCE
Refills: 0 | Status: COMPLETED | OUTPATIENT
Start: 2024-06-16 | End: 2024-06-16

## 2024-06-16 RX ORDER — LEVOTHYROXINE SODIUM 125 MCG
1 TABLET ORAL
Qty: 0 | Refills: 0 | DISCHARGE

## 2024-06-16 RX ORDER — QUETIAPINE FUMARATE 200 MG/1
0 TABLET, FILM COATED ORAL
Qty: 0 | Refills: 0 | DISCHARGE

## 2024-06-16 RX ORDER — BUDESONIDE AND FORMOTEROL FUMARATE DIHYDRATE 160; 4.5 UG/1; UG/1
2 AEROSOL RESPIRATORY (INHALATION)
Qty: 0 | Refills: 0 | DISCHARGE

## 2024-06-16 RX ORDER — DILTIAZEM HCL 120 MG
1 CAPSULE, EXT RELEASE 24 HR ORAL
Qty: 0 | Refills: 0 | DISCHARGE

## 2024-06-16 RX ORDER — SIMVASTATIN 20 MG/1
1 TABLET, FILM COATED ORAL
Qty: 0 | Refills: 0 | DISCHARGE

## 2024-06-16 RX ORDER — ONDANSETRON 8 MG/1
4 TABLET, FILM COATED ORAL ONCE
Refills: 0 | Status: COMPLETED | OUTPATIENT
Start: 2024-06-16 | End: 2024-06-16

## 2024-06-16 RX ORDER — ICOSAPENT ETHYL 500 MG/1
2 CAPSULE, LIQUID FILLED ORAL
Qty: 0 | Refills: 0 | DISCHARGE

## 2024-06-16 RX ORDER — DULOXETINE HYDROCHLORIDE 30 MG/1
0 CAPSULE, DELAYED RELEASE ORAL
Qty: 0 | Refills: 0 | DISCHARGE

## 2024-06-16 RX ADMIN — SODIUM CHLORIDE 500 MILLILITER(S): 9 INJECTION INTRAMUSCULAR; INTRAVENOUS; SUBCUTANEOUS at 15:18

## 2024-06-16 RX ADMIN — Medication 1000 MILLIGRAM(S): at 13:34

## 2024-06-16 RX ADMIN — ONDANSETRON 4 MILLIGRAM(S): 8 TABLET, FILM COATED ORAL at 13:18

## 2024-06-16 RX ADMIN — Medication 1000 MILLIGRAM(S): at 13:33

## 2024-06-16 RX ADMIN — Medication 400 MILLIGRAM(S): at 13:18

## 2024-06-16 RX ADMIN — SODIUM CHLORIDE 500 MILLILITER(S): 9 INJECTION INTRAMUSCULAR; INTRAVENOUS; SUBCUTANEOUS at 16:04

## 2024-06-16 NOTE — ED PROVIDER NOTE - CLINICAL SUMMARY MEDICAL DECISION MAKING FREE TEXT BOX
80 y/o M hx of hypothyroidism, HTN, CAD w/ stents presents w/ abdominal pain. endorsing 5/10 abdominal discomfort. endorsing abdominal distension. last bowel movement 1 day ago. endorsing nausea w/ 1 episode of non-bloody emesis earlier today. denies chest pain/sob. denies fever/chills. denies bloody bowel movements. denies trauma/falls.   no prior abdominal surgeries per family.   CT abd/pelvis, no significant tenderness on my exam, well appearing. consider pancreatitis, colitis, enteritis, obstruction, appendicitis. differential not limited to above. 80 y/o M hx of hypothyroidism, HTN, CAD w/ stents presents w/ abdominal pain. endorsing 5/10 abdominal discomfort. endorsing abdominal distension. last bowel movement 1 day ago. endorsing nausea w/ 1 episode of non-bloody emesis earlier today. denies chest pain/sob. denies fever/chills. denies bloody bowel movements. denies trauma/falls.   no prior abdominal surgeries per family.   CT abd/pelvis, no significant tenderness on my exam, well appearing. consider pancreatitis, colitis, enteritis, obstruction, appendicitis. differential not limited to above.    ileus discussed w/ patient family. tolerated Po intake. renal function improved s/p fluid bolus. they are comfortable to take patient home. f/u outpatient w/ nephro/primary.

## 2024-06-16 NOTE — ED PROVIDER NOTE - NSFOLLOWUPCLINICS_GEN_ALL_ED_FT
[facility]  Internal Medicine  [address] [place] [address]  Phone: [phone]  Fax:     [facility] Internal Medicine  General Internal Medicine  [address] [place] [address]  Phone: [phone]  Fax:     [facility] [place]  Internal Medicine  [address] [place] [address]  Phone: [phone]  Fax: [phone]    [facility]  Internal Medicine  [address] [place] [address]  Phone: [phone]  Fax: [phone]    [facility]  Nephrology  [address] [place] [address], [address]nd [place] [address]  Phone: [phone]  Fax:

## 2024-06-16 NOTE — ED PROVIDER NOTE - PATIENT PORTAL LINK FT
You can access the FollowMyHealth Patient Portal offered by [facility] by registering at the following website: [email or web address]. By joining [facility]’s FollowMyHealth portal, you will also be able to view your health information using other applications (apps) compatible with our system.

## 2024-06-16 NOTE — ED ADULT NURSE NOTE - NSFALLUNIVINTERV_ED_ALL_ED
Bed/Stretcher in lowest position, wheels locked, appropriate side rails in place/Call bell, personal items and telephone in reach/Instruct patient to call for assistance before getting out of bed/chair/stretcher/Non-slip footwear applied when patient is off stretcher/[place] to call system/Physically safe environment - no spills, clutter or unnecessary equipment/Purposeful proactive rounding/Room/bathroom lighting operational, light cord in reach

## 2024-06-16 NOTE — ED ADULT NURSE NOTE - OBJECTIVE STATEMENT
received er bed pr07 c/o abdominal pain since yesterday with vomiting x 1 this morning, large bm x 2 since yesterday with mix of hard stool and watery stool abdomen firm and distended over past few months but worse over past 2 days denies fever/chills denies chest pain or shortness of breath l lower lung crackles  audible c/o generalized weakness and fatigue decreased appetite over past couple of months

## 2024-06-16 NOTE — ED PROVIDER NOTE - NSFOLLOWUPINSTRUCTIONS_ED_ALL_ED_FT
Followup with gastroenterologist for ileus and nephrologist for hyponatremia in next 5 days.     Please return to the emergency department immediately should you feel worse in any way or have any of the following symptoms:    •	especially increased or different pain  •	 fevers  •	persistent vomiting  •	shaking chills     Please follow up with the Doctor listed within the time frame specified. Thank you for coming to the emergency department. We hope you are feeling improved and continue to get better. Have a nice day.

## 2024-06-16 NOTE — ED PROVIDER NOTE - CARE PROVIDER_API CALL
Enmanuel Armenta [id number] is a 67 y.o. male who has been consulted for vancomycin dosing.    The patient has the following labs: Scr 1 Crcl 68.9 WBC 20.6  Current weight is 71.1 kg (156 lb 12 oz)    Pt being treated with vancomycin for PNA.    The patient will be restarted on vancomycin at a dose of 1250 mg every 24 hours (17.6 mg/kg/dose). The vancomycin trough has been ordered for 12/30/18 at 1300. Target goal is 15-20.     Patient will be followed by pharmacy for changes in renal function, toxicity, and efficacy.      Thank you for allowing us to participate in this patient's care.     Jayme Blair, DelgadoD       [facility] Osullivan  Nephrology  [address]  [place] [address]  Phone: [phone]  Fax: [phone]  Follow Up Time: 4-6 Days    Cali Mi  Gastroenterology  [address], [address]  [place] [address]  Phone: [phone]  Fax: [phone]  Follow Up Time: 4-6 Days    Martir Styles  Gastroenterology  [address], [address]  [place] [address]  Phone: [phone]  Fax: [phone]  Follow Up Time: 4-6 Days

## 2024-06-16 NOTE — ED PROVIDER NOTE - CARE PROVIDERS DIRECT ADDRESSES
,[email or web address]

## 2024-06-16 NOTE — ED PROVIDER NOTE - PROVIDER TOKENS
PROVIDER:[TOKEN:[[id number]],FOLLOWUP:[4-6 Days]],PROVIDER:[TOKEN:[[id number]],FOLLOWUP:[4-6 Days]],PROVIDER:[TOKEN:[[id number]],FOLLOWUP:[4-6 Days]]

## 2024-06-16 NOTE — ED PROVIDER NOTE - OBJECTIVE STATEMENT
78 y/o M hx of hypothyroidism, HTN, CAD w/ stents presents w/ abdominal pain. endorsing 5/10 abdominal discomfort. endorsing abdominal distension. last bowel movement 1 day ago. endorsing nausea w/ 1 episode of non-bloody emesis earlier today. denies chest pain/sob. denies fever/chills. denies bloody bowel movements. denies trauma/falls.

## 2024-07-15 NOTE — ED PROVIDER NOTE - NSFOLLOWUPCLINICSTOKEN_GEN_ALL_ED_FT
Initial (On Arrival) [id number]: || ||00\01||False;[id number]: || ||00\01||False;[id number]: || ||00\01||False;[id number]: || ||00\01||False;[id number]: || ||00\01||False;

## 2025-05-08 ENCOUNTER — EMERGENCY (EMERGENCY)
Facility: HOSPITAL | Age: 80
LOS: 0 days | Discharge: HOME | End: 2025-05-08
Attending: STUDENT IN AN ORGANIZED HEALTH CARE EDUCATION/TRAINING PROGRAM
Payer: MEDICARE

## 2025-05-08 VITALS
DIASTOLIC BLOOD PRESSURE: 63 MMHG | RESPIRATION RATE: 18 BRPM | SYSTOLIC BLOOD PRESSURE: 118 MMHG | HEART RATE: 66 BPM | TEMPERATURE: 98 F | OXYGEN SATURATION: 97 %

## 2025-05-08 VITALS
SYSTOLIC BLOOD PRESSURE: 106 MMHG | OXYGEN SATURATION: 97 % | RESPIRATION RATE: 16 BRPM | HEIGHT: 65 IN | DIASTOLIC BLOOD PRESSURE: 68 MMHG | HEART RATE: 68 BPM | WEIGHT: 175.93 LBS | TEMPERATURE: 99 F

## 2025-05-08 DIAGNOSIS — Z96.653 PRESENCE OF ARTIFICIAL KNEE JOINT, BILATERAL: Chronic | ICD-10-CM

## 2025-05-08 DIAGNOSIS — Z98.89 OTHER SPECIFIED POSTPROCEDURAL STATES: Chronic | ICD-10-CM

## 2025-05-08 DIAGNOSIS — Z98.1 ARTHRODESIS STATUS: Chronic | ICD-10-CM

## 2025-05-08 DIAGNOSIS — I10 ESSENTIAL (PRIMARY) HYPERTENSION: ICD-10-CM

## 2025-05-08 DIAGNOSIS — E11.9 TYPE 2 DIABETES MELLITUS WITHOUT COMPLICATIONS: ICD-10-CM

## 2025-05-08 DIAGNOSIS — Z95.5 PRESENCE OF CORONARY ANGIOPLASTY IMPLANT AND GRAFT: ICD-10-CM

## 2025-05-08 DIAGNOSIS — R10.9 UNSPECIFIED ABDOMINAL PAIN: ICD-10-CM

## 2025-05-08 DIAGNOSIS — Z98.890 OTHER SPECIFIED POSTPROCEDURAL STATES: Chronic | ICD-10-CM

## 2025-05-08 DIAGNOSIS — I25.10 ATHEROSCLEROTIC HEART DISEASE OF NATIVE CORONARY ARTERY WITHOUT ANGINA PECTORIS: ICD-10-CM

## 2025-05-08 DIAGNOSIS — Z95.5 PRESENCE OF CORONARY ANGIOPLASTY IMPLANT AND GRAFT: Chronic | ICD-10-CM

## 2025-05-08 DIAGNOSIS — E78.5 HYPERLIPIDEMIA, UNSPECIFIED: ICD-10-CM

## 2025-05-08 DIAGNOSIS — N40.1 BENIGN PROSTATIC HYPERPLASIA WITH LOWER URINARY TRACT SYMPTOMS: ICD-10-CM

## 2025-05-08 DIAGNOSIS — R35.0 FREQUENCY OF MICTURITION: ICD-10-CM

## 2025-05-08 LAB
ALBUMIN SERPL ELPH-MCNC: 3.1 G/DL — LOW (ref 3.3–5)
ALP SERPL-CCNC: 66 U/L — SIGNIFICANT CHANGE UP (ref 40–120)
ALT FLD-CCNC: 41 U/L — SIGNIFICANT CHANGE UP (ref 12–78)
ANION GAP SERPL CALC-SCNC: 7 MMOL/L — SIGNIFICANT CHANGE UP (ref 5–17)
APPEARANCE UR: CLEAR — SIGNIFICANT CHANGE UP
AST SERPL-CCNC: 37 U/L — SIGNIFICANT CHANGE UP (ref 15–37)
BASOPHILS # BLD AUTO: 0.02 K/UL — SIGNIFICANT CHANGE UP (ref 0–0.2)
BASOPHILS NFR BLD AUTO: 0.3 % — SIGNIFICANT CHANGE UP (ref 0–2)
BILIRUB SERPL-MCNC: 0.6 MG/DL — SIGNIFICANT CHANGE UP (ref 0.2–1.2)
BILIRUB UR-MCNC: NEGATIVE — SIGNIFICANT CHANGE UP
BUN SERPL-MCNC: 25 MG/DL — HIGH (ref 7–23)
CALCIUM SERPL-MCNC: 8.8 MG/DL — SIGNIFICANT CHANGE UP (ref 8.5–10.1)
CHLORIDE SERPL-SCNC: 101 MMOL/L — SIGNIFICANT CHANGE UP (ref 96–108)
CO2 SERPL-SCNC: 27 MMOL/L — SIGNIFICANT CHANGE UP (ref 22–31)
COLOR SPEC: YELLOW — SIGNIFICANT CHANGE UP
CREAT SERPL-MCNC: 1.48 MG/DL — HIGH (ref 0.5–1.3)
DIFF PNL FLD: NEGATIVE — SIGNIFICANT CHANGE UP
EGFR: 48 ML/MIN/1.73M2 — LOW
EGFR: 48 ML/MIN/1.73M2 — LOW
EOSINOPHIL # BLD AUTO: 0.07 K/UL — SIGNIFICANT CHANGE UP (ref 0–0.5)
EOSINOPHIL NFR BLD AUTO: 1 % — SIGNIFICANT CHANGE UP (ref 0–6)
GLUCOSE SERPL-MCNC: 258 MG/DL — HIGH (ref 70–99)
GLUCOSE UR QL: >=1000 MG/DL
HCT VFR BLD CALC: 35.5 % — LOW (ref 39–50)
HGB BLD-MCNC: 12.4 G/DL — LOW (ref 13–17)
IMM GRANULOCYTES NFR BLD AUTO: 0.3 % — SIGNIFICANT CHANGE UP (ref 0–0.9)
KETONES UR-MCNC: NEGATIVE MG/DL — SIGNIFICANT CHANGE UP
LACTATE SERPL-SCNC: 2.1 MMOL/L — HIGH (ref 0.7–2)
LEUKOCYTE ESTERASE UR-ACNC: NEGATIVE — SIGNIFICANT CHANGE UP
LYMPHOCYTES # BLD AUTO: 1.48 K/UL — SIGNIFICANT CHANGE UP (ref 1–3.3)
LYMPHOCYTES # BLD AUTO: 20.9 % — SIGNIFICANT CHANGE UP (ref 13–44)
MCHC RBC-ENTMCNC: 34.2 PG — HIGH (ref 27–34)
MCHC RBC-ENTMCNC: 34.9 G/DL — SIGNIFICANT CHANGE UP (ref 32–36)
MCV RBC AUTO: 97.8 FL — SIGNIFICANT CHANGE UP (ref 80–100)
MONOCYTES # BLD AUTO: 0.82 K/UL — SIGNIFICANT CHANGE UP (ref 0–0.9)
MONOCYTES NFR BLD AUTO: 11.6 % — SIGNIFICANT CHANGE UP (ref 2–14)
NEUTROPHILS # BLD AUTO: 4.66 K/UL — SIGNIFICANT CHANGE UP (ref 1.8–7.4)
NEUTROPHILS NFR BLD AUTO: 65.9 % — SIGNIFICANT CHANGE UP (ref 43–77)
NITRITE UR-MCNC: NEGATIVE — SIGNIFICANT CHANGE UP
NRBC BLD AUTO-RTO: 0 /100 WBCS — SIGNIFICANT CHANGE UP (ref 0–0)
PH UR: 6 — SIGNIFICANT CHANGE UP (ref 5–8)
PLATELET # BLD AUTO: 265 K/UL — SIGNIFICANT CHANGE UP (ref 150–400)
POTASSIUM SERPL-MCNC: 4.1 MMOL/L — SIGNIFICANT CHANGE UP (ref 3.5–5.3)
POTASSIUM SERPL-SCNC: 4.1 MMOL/L — SIGNIFICANT CHANGE UP (ref 3.5–5.3)
PROT SERPL-MCNC: 7 GM/DL — SIGNIFICANT CHANGE UP (ref 6–8.3)
PROT UR-MCNC: SIGNIFICANT CHANGE UP MG/DL
RBC # BLD: 3.63 M/UL — LOW (ref 4.2–5.8)
RBC # FLD: 14.2 % — SIGNIFICANT CHANGE UP (ref 10.3–14.5)
SODIUM SERPL-SCNC: 135 MMOL/L — SIGNIFICANT CHANGE UP (ref 135–145)
SP GR SPEC: >1.03 — HIGH (ref 1–1.03)
UROBILINOGEN FLD QL: 0.2 MG/DL — SIGNIFICANT CHANGE UP (ref 0.2–1)
WBC # BLD: 7.07 K/UL — SIGNIFICANT CHANGE UP (ref 3.8–10.5)
WBC # FLD AUTO: 7.07 K/UL — SIGNIFICANT CHANGE UP (ref 3.8–10.5)

## 2025-05-08 PROCEDURE — 74177 CT ABD & PELVIS W/CONTRAST: CPT | Mod: 26

## 2025-05-08 PROCEDURE — 99285 EMERGENCY DEPT VISIT HI MDM: CPT

## 2025-05-08 PROCEDURE — 76870 US EXAM SCROTUM: CPT | Mod: 26

## 2025-05-08 RX ORDER — ACETAMINOPHEN 500 MG/5ML
1000 LIQUID (ML) ORAL ONCE
Refills: 0 | Status: COMPLETED | OUTPATIENT
Start: 2025-05-08 | End: 2025-05-08

## 2025-05-08 RX ORDER — METHOCARBAMOL 500 MG/1
500 TABLET, FILM COATED ORAL ONCE
Refills: 0 | Status: COMPLETED | OUTPATIENT
Start: 2025-05-08 | End: 2025-05-08

## 2025-05-08 RX ORDER — METHOCARBAMOL 500 MG/1
1 TABLET, FILM COATED ORAL
Qty: 12 | Refills: 0
Start: 2025-05-08

## 2025-05-08 RX ADMIN — Medication 1000 MILLILITER(S): at 17:47

## 2025-05-08 RX ADMIN — Medication 400 MILLIGRAM(S): at 17:45

## 2025-05-08 RX ADMIN — METHOCARBAMOL 500 MILLIGRAM(S): 500 TABLET, FILM COATED ORAL at 17:45

## 2025-05-08 NOTE — ED PROVIDER NOTE - PATIENT PORTAL LINK FT
You can access the FollowMyHealth Patient Portal offered by [facility] by registering at the following website: [email or web address]. By joining [facility]’s FollowMyHealth portal, you will also be able to view your health information using other applications (apps) compatible with our system.

## 2025-05-08 NOTE — ED PROVIDER NOTE - NSFOLLOWUPINSTRUCTIONS_ED_ALL_ED_FT
You were seen in the ED for groin pain.    Your labs and US did not show acute findings. Your CT shows stable appearing hernia from prior, no signs of incarceration.    Follow up with your primary doctor.    Take robaxin as needed for muscle pain.    If you have new or acute symptoms, please return to the ED.

## 2025-05-08 NOTE — ED ADULT TRIAGE NOTE - CHIEF COMPLAINT QUOTE
Pt presents to the ED c/o right groin pain started this morning. endorse urinary frequency. denies testicular pain/swelling. hx: htn,  dm, arthritis, stents

## 2025-05-08 NOTE — ED PROVIDER NOTE - CLINICAL SUMMARY MEDICAL DECISION MAKING FREE TEXT BOX
78 y/o M with PMH HTN, HLD, DM, CAD s/p stents here today with R groin pain. Patient endorses pain worsening with movement. He states he has some urinary frequency at baseline secondary to his BPH. He denies blood in the urine. No fever, chills, N/V/D. He tried using tylenol at home with minimal relief of pain. No significant abdominal surgeries. Patient states his testicles feel "Swollen".    In the ED, vitals stable.    GENERAL: Awake, alert, NAD  HEENT: NC/AT, moist mucous membranes  LUNGS: CTAB, no wheezes or crackles   CARDIAC: RRR, no m/r/g  ABDOMEN: Soft, mild tenderness noted over the R inguinal crease, non distended, no rebound, no guarding  : normal testes  BACK: No midline spinal tenderness, no CVA tenderness  EXT: No edema, no calf tenderness, no deformities.  NEURO: A&Ox3. Moving all extremities.  SKIN: Warm and dry. No rash.  PSYCH: Normal affect.    Exam as above.  No significant abdominal tenderness, some tenderness to palpation over the R inguinal crease  Will plan for labs and CT imaging/testicular ultrasound to further evaluate, r/o hernia vs stone vs other acute pathology  Pain control    Labs WNL  CT imaging is non focal, shows stable appearing hernias that patient is aware of  US testicles is negative  Pain improved s/p ofirimev and robaxin  Plan for discharge

## 2025-05-08 NOTE — ED ADULT NURSE NOTE - OBJECTIVE STATEMENT
Patient alert and verbally responsive, came in due to right groin pain that started this morning. Endorse urinary frequency with pain upon moving. denies testicular pain/swelling. hx: htn,  dm, arthritis, stents

## 2025-06-27 NOTE — PRE-OP CHECKLIST - IV STARTED
Spiritual Care Visit Note    Patient Name: Jose Alberto Lizama Date of Spiritual Care Visit: 27   : 1961 Primary Dx: <principal problem not specified>       Referred By: Referral From:     Spiritual Care Taxonomy:    Intended Effects: Build relationship of care and support    Methods: Accompany someone in their spiritual/Catholic practice outside your primitivo tradition, Collaborate with care team member, Encourage self reflection, Offer support, Offer emotional support, Offer spiritual/Catholic support    Interventions: Acknowledge current situation, Explain  role, Provide hospitality, Silent prayer    Visit Type/Summary:     - Spiritual Care: Consulted with RN prior to visit. Patient and family member expressed appreciation for  visit. Provided information regarding how to contact Spiritual Care and left a Spiritual Care information card. Difficult to patient to talk. Wife at bedside cam and relaxed.  remains available as needed for follow up.    Spiritual Care support can be requested via an Epic consult. For urgent/immediate needs, please contact the On Call  at: [place]: ext [phone]    Sanjay CEBALLOS  Chaplain Resident  [id number]     
yes

## (undated) DEVICE — SENSOR O2 FINGER ADULT 24/CA

## (undated) DEVICE — SOL INJ NS 0.9% 500ML 2 PORT

## (undated) DEVICE — BITE BLOCK ADULT 20 X 27MM (GREEN)

## (undated) DEVICE — CATH IV SAFE BC 20G X 1.16" (PINK)

## (undated) DEVICE — NDL INJ SCLERO INTERJECT 25G

## (undated) DEVICE — BRUSH COLONOSCOPY CYTOLOGY

## (undated) DEVICE — FORCEP RADIAL JAW 4 JUMBO 2.8MM 3.2MM 240CM ORANGE DISP

## (undated) DEVICE — SYR ALLIANCE II INFLATION 60ML

## (undated) DEVICE — TUBING IV SET GRAVITY 3Y 100" MACRO

## (undated) DEVICE — FOLEY HOLDER STATLOCK 2 WAY ADULT

## (undated) DEVICE — TUBING SUCTION 20FT

## (undated) DEVICE — CATH IV SAFE BC 22G X 1" (BLUE)

## (undated) DEVICE — POLY TRAP ETRAP

## (undated) DEVICE — NDL INJ SCLERO INTERJECT 23G

## (undated) DEVICE — TUBING SUCTION CONN 6FT STERILE

## (undated) DEVICE — PACK IV START WITH CHG

## (undated) DEVICE — SUCTION YANKAUER NO CONTROL VENT

## (undated) DEVICE — BIOPSY FORCEP RADIAL JAW 4 STANDARD WITH NEEDLE

## (undated) DEVICE — SNARE EXACTO COLD 9MMX230CM